# Patient Record
Sex: FEMALE | Race: WHITE | NOT HISPANIC OR LATINO | Employment: FULL TIME | URBAN - METROPOLITAN AREA
[De-identification: names, ages, dates, MRNs, and addresses within clinical notes are randomized per-mention and may not be internally consistent; named-entity substitution may affect disease eponyms.]

---

## 2016-06-01 LAB — PAP SMEAR - HIM PATIENT REPORTED: NEGATIVE

## 2017-02-08 ENCOUNTER — OFFICE VISIT (OUTPATIENT)
Dept: URGENT CARE | Facility: URGENT CARE | Age: 52
End: 2017-02-08
Payer: COMMERCIAL

## 2017-02-08 ENCOUNTER — TELEPHONE (OUTPATIENT)
Dept: FAMILY MEDICINE | Facility: CLINIC | Age: 52
End: 2017-02-08

## 2017-02-08 VITALS
HEART RATE: 78 BPM | TEMPERATURE: 97 F | OXYGEN SATURATION: 100 % | DIASTOLIC BLOOD PRESSURE: 88 MMHG | RESPIRATION RATE: 15 BRPM | BODY MASS INDEX: 32.39 KG/M2 | SYSTOLIC BLOOD PRESSURE: 136 MMHG | WEIGHT: 210 LBS

## 2017-02-08 DIAGNOSIS — J01.00 ACUTE MAXILLARY SINUSITIS, RECURRENCE NOT SPECIFIED: Primary | ICD-10-CM

## 2017-02-08 PROCEDURE — 99213 OFFICE O/P EST LOW 20 MIN: CPT | Performed by: FAMILY MEDICINE

## 2017-02-08 RX ORDER — LEVOFLOXACIN 750 MG/1
750 TABLET, FILM COATED ORAL DAILY
Qty: 10 TABLET | Refills: 0 | Status: SHIPPED | OUTPATIENT
Start: 2017-02-08 | End: 2017-06-01

## 2017-02-08 ASSESSMENT — PAIN SCALES - GENERAL: PAINLEVEL: NO PAIN (0)

## 2017-02-08 NOTE — TELEPHONE ENCOUNTER
No appointment's available today.    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

## 2017-02-08 NOTE — TELEPHONE ENCOUNTER
Reason for call:  Patient reporting a symptom    Symptom or request: sinus infection symptoms    Duration (how long have symptoms been present): 2 weeks    Have you been treated for this before? Yes    Additional comments: pt states having symptoms for two weeks wondering if can get seen today at [place] location anytime 5:30 or after. Please advise and contact pt in regards.    Phone Number patient can be reached at:  Work number on file:  [phone] (work)    Best Time:  ANY    Can we leave a detailed message on this number:  YES    Call taken on 2/8/2017 at 12:26 PM by Sangita Mcdonald

## 2017-02-08 NOTE — TELEPHONE ENCOUNTER
Spoke with patient and she reports sinus congestion, fatigue for about 2 weeks now.  No facial pain, no fever, no breathing difficulties, no cough.   She reports a history of sinus infections that at times turn in to bronchitis   She can only make evening appts, none at [facility], tried [facility], [facility] and [facility].  No openings.  Discussed UC options, she was agreeable to this plan, also discussed Zipnosis.  She reports she will try Zipnosis otherwise will go to UC to be evaluated.  Claudia Warren RN

## 2017-02-09 NOTE — PATIENT INSTRUCTIONS
Sinusitis (Antibiotic Treatment)    The sinuses are air-filled spaces within the bones of the face. They connect to the inside of the nose. Sinusitis is an inflammation of the tissue lining the sinus cavity. Sinus inflammation can occur during a cold. It can also be due to allergies to pollens and other particles in the air. Sinusitis can cause symptoms of sinus congestion and fullness. A sinus infection causes fever, headache and facial pain. There is often green or yellow drainage from the nose or into the back of the throat (post-nasal drip). You have been given antibiotics to treat this condition.  Home care:    Take the full course of antibiotics as instructed. Do not stop taking them, even if you feel better.    Drink plenty of water, hot tea, and other liquids. This may help thin mucus. It also may promote sinus drainage.    Heat may help soothe painful areas of the face. Use a towel soaked in hot water. Or,  the shower and direct the hot spray onto your face. Using a vaporizer along with a menthol rub at night may also help.     An expectorant containing guaifenesin may help thin the mucus and promote drainage from the sinuses.    Over-the-counter decongestants may be used unless a similar medicine was prescribed. Nasal sprays work the fastest. Use one that contains phenylephrine or oxymetazoline. First blow the nose gently. Then use the spray. Do not use these medicines more often than directed on the label or symptoms may get worse. You may also use tablets containing pseudoephedrine. Avoid products that combine ingredients, because side effects may be increased. Read labels. You can also ask the pharmacist for help. (NOTE: Persons with high blood pressure should not use decongestants. They can raise blood pressure.)    Over-the-counter antihistamines may help if allergies contributed to your sinusitis.      Do not use nasal rinses or irrigation during an acute sinus infection, unless told to by  your health care provider. Rinsing may spread the infection to other sinuses.    Use acetaminophen or ibuprofen to control pain, unless another pain medicine was prescribed. (If you have chronic liver or kidney disease or ever had a stomach ulcer, talk with your doctor before using these medicines. Aspirin should never be used in anyone under 18 years of age who is ill with a fever. It may cause severe liver damage.)    Don't smoke. This can worsen symptoms.  Follow-up care  Follow up with your healthcare provider or our staff if you are not improving within the next week.  When to seek medical advice  Call your healthcare provider if any of these occur:    Facial pain or headache becoming more severe    Stiff neck    Unusual drowsiness or confusion    Swelling of the forehead or eyelids    Vision problems, including blurred or double vision    Fever of 100.4 F (38 C) or higher, or as directed by your healthcare provider    Seizure    Breathing problems    Symptoms not resolving within 10 days    3309-9690 [employer]. [address] [place] [address]. All rights reserved. This information is not intended as a substitute for professional medical care. Always follow your healthcare professional's instructions.        Patient Education    Levofloxacin Ophthalmic drops, solution    Levofloxacin Oral solution    Levofloxacin Oral tablet    Levofloxacin Solution for injection    Levofloxacin, Dextrose Solution for injection  Levofloxacin Oral tablet  What is this medicine?  LEVOFLOXACIN (oscar voe FLOX a sin) is a quinolone antibiotic. It is used to treat certain kinds of bacterial infections. It will not work for colds, flu, or other viral infections.  This medicine may be used for other purposes; ask your health care provider or pharmacist if you have questions.  What should I tell my health care provider before I take this medicine?  They need to know if you have any of these  conditions:    cerebral disease    irregular heartbeat    kidney disease    seizure disorder    an unusual or allergic reaction to levofloxacin, other antibiotics or medicines, foods, dyes, or preservatives    pregnant or trying to get pregnant    breast-feeding  How should I use this medicine?  Take this medicine by mouth with a full glass of water. Follow the directions on the prescription label. This medicine can be taken with or without food. Take your medicine at regular intervals. Do not take your medicine more often than directed. Do not skip doses or stop your medicine early even if you feel better. Do not stop taking except on your doctor's advice.  A special MedGuide will be given to you by the pharmacist with each prescription and refill. Be sure to read this information carefully each time.  Talk to your pediatrician regarding the use of this medicine in children. While this drug may be prescribed for children as young as 6 months for selected conditions, precautions do apply.  Overdosage: If you think you have taken too much of this medicine contact a poison control center or emergency room at once.  NOTE: This medicine is only for you. Do not share this medicine with others.  What if I miss a dose?  If you miss a dose, take it as soon as you remember. If it is almost time for your next dose, take only that dose. Do not take double or extra doses.  What may interact with this medicine?  Do not take this medicine with any of the following medications:    arsenic trioxide    chloroquine    droperidol    medicines for irregular heart rhythm like amiodarone, disopyramide, dofetilide, flecainide, quinidine, procainamide, sotalol    some medicines for depression or mental problems like phenothiazines, pimozide, and ziprasidone  This medicine may also interact with the following medications:    amoxapine    antacids    cisapride    dairy products    didanosine (ddI) buffered tablets or  powder    haloperidol    multivitamins    NSAIDS, medicines for pain and inflammation, like ibuprofen or naproxen    retinoid products like tretinoin or isotretinoin    risperidone    some other antibiotics like clarithromycin or erythromycin    sucralfate    theophylline    warfarin  This list may not describe all possible interactions. Give your health care provider a list of all the medicines, herbs, non-prescription drugs, or dietary supplements you use. Also tell them if you smoke, drink alcohol, or use illegal drugs. Some items may interact with your medicine.  What should I watch for while using this medicine?  Tell your doctor or health care professional if your symptoms do not improve or if they get worse. Drink several glasses of water a day and cut down on drinks that contain caffeine. You must not get dehydrated while taking this medicine.  You may get drowsy or dizzy. Do not drive, use machinery, or do anything that needs mental alertness until you know how this medicine affects you. Do not sit or stand up quickly, especially if you are an older patient. This reduces the risk of dizzy or fainting spells.  This medicine can make you more sensitive to the sun. Keep out of the sun. If you cannot avoid being in the sun, wear protective clothing and use a sunscreen. Do not use sun lamps or tanning beds/booths. Contact your doctor if you get a sunburn.  If you are a diabetic monitor your blood glucose carefully. If you get an unusual reading stop taking this medicine and call your doctor right away.  Do not treat diarrhea with over-the-counter products. Contact your doctor if you have diarrhea that lasts more than 2 days or if the diarrhea is severe and watery.  Avoid antacids, calcium, iron, and zinc products for 2 hours before and 2 hours after taking a dose of this medicine.  What side effects may I notice from receiving this medicine?  Side effects that you should report to your doctor or health care  professional as soon as possible:    allergic reactions like skin rash or hives, swelling of the face, lips, or tongue    changes in vision    confusion, nightmares or hallucinations    difficulty breathing    irregular heartbeat, chest pain    joint, muscle or tendon pain    pain or difficulty passing urine    persistent headache with or without blurred vision    redness, blistering, peeling or loosening of the skin, including inside the mouth    seizures    unusual pain, numbness, tingling, or weakness    vaginal irritation, discharge  Side effects that usually do not require medical attention (report to your doctor or health care professional if they continue or are bothersome):    diarrhea    dry mouth    headache    stomach upset, nausea    trouble sleeping  This list may not describe all possible side effects. Call your doctor for medical advice about side effects. You may report side effects to FDA at 5-729-FDA-2233.  Where should I keep my medicine?  Keep out of the reach of children.  Store at room temperature between 15 and 30 degrees C (59 and 86 degrees F). Keep in a tightly closed container. Throw away any unused medicine after the expiration date.  NOTE:This sheet is a summary. It may not cover all possible information. If you have questions about this medicine, talk to your doctor, pharmacist, or health care provider. Copyright  2016 [employer]

## 2017-02-09 NOTE — PROGRESS NOTES
SUBJECTIVE:                                                    Jenae Miller is a 51 year old female who presents to clinic today for the following health issues:      RESPIRATORY SYMPTOMS      Duration: 2 week    Description  nasal congestion, rhinorrhea, facial pain/pressure, cough and post nasal drainage    Severity: moderate    Accompanying signs and symptoms: None    History (predisposing factors):  asthma    Precipitating or alleviating factors: None    Therapies tried and outcome:  none         Problem list and histories reviewed & adjusted, as indicated.  Additional history: as documented    Patient Active Problem List   Diagnosis     CARDIOVASCULAR SCREENING; LDL GOAL LESS THAN 160     Family history of breast cancer in mother     Varicose veins of legs     Colon polyp     Thrush, oral     S/P colonoscopy     Intermittent asthma     Hypertension goal BP (blood pressure) < 140/90     Congenital anomaly of fixation of intestine     Aortic root dilation (H)     Bariatric surgery status     Cancer of midline of right breast (H)     Past Surgical History   Procedure Laterality Date     Cystoscopy,dil urethral stricture       age 7     Hc colonoscopy thru stoma w biopsy/cautery tumor/polyp/lesion  1996     Dr. [place] 8/30/96, '01 Repeat 2008     Breast biopsy, rt/lt  2001     lt 2001, rt  2010     Cryocautery of cervix       [place]     Hc colonoscopy thru stoma, diagnostic  5/07           Tonsillectomy & adenoidectomy  2005     Hc dilation/curettage diag/ther non ob  2006     Hc tooth extraction w/forcep  1986     Mastectomy, bilateral  5/26/10     rt sided breast ca  with reconstruction     Colonoscopy  12.13.12     Repeat Colonoscopy in 5 yrs.     Abdomen surgery  Dec. 2014     Fat grafting for breast reconstruction     Cosmetic surgery  2003 and 2014     Abdomen surgery  6/17/15     Vertical sleeve gastrectomy       Social History   Substance Use Topics     Smoking status: Never Smoker      Smokeless  tobacco: Never Used     Alcohol Use: Yes      Comment: 1-2 drinks a week     Family History   Problem Relation Age of Onset     CANCER Paternal Grandmother      stomach     Colon Polyps Father      large polyps     DIABETES Father      Hypertension Father      Breast Cancer Other      Obesity Father      Obesity Mother      Obesity Sister      Unknown/Adopted Maternal Grandmother      Unknown/Adopted Maternal Grandfather      Breast Cancer Mother 57      at 60 of breast cancer     Obesity Sister      Unknown/Adopted No family hx of          Current Outpatient Prescriptions   Medication Sig Dispense Refill     levofloxacin (LEVAQUIN) 750 MG tablet Take 1 tablet (750 mg) by mouth daily 10 tablet 0     CALCIUM CITRATE PO        albuterol (PROAIR HFA, PROVENTIL HFA, VENTOLIN HFA) 108 (90 BASE) MCG/ACT inhaler Inhale 2 puffs into the lungs every 6 hours as needed for shortness of breath / dyspnea or wheezing 3 Inhaler 1     vitamin  B complex with vitamin C (VITAMIN  B COMPLEX) TABS Take 1 tablet by mouth daily       CycloSPORINE (RESTASIS OP) Apply  to eye 2 times daily.       Omega-3 Fatty Acids (FISH OIL) 1200 MG capsule Take 1 capsule by mouth daily.       cholecalciferol (VITAMIN D) 1000 UNIT tablet Take 1 tablet by mouth 2 times daily.       tamoxifen (NOLVADEX) 20 MG tablet Take 20 mg by mouth daily.       CENTRUM OR TABS 1 TABLET DAILY       Allergies   Allergen Reactions     Lortab [Hydrocodone-Acetaminophen]      Oxycodone Hives     Liquid form   (pt has tolerated the pills in past, though)       Penicillins Hives     Sulfa Drugs      Eyes red and swollen     Tape [Adhesive Tape] Rash     surgical tape     Taxotere Hives     Recent Labs   Lab Test  11/23/15   1854 08/28/15 02/27/15 02/21/14   01/23/13   0910  11   0858   LDL   --    --    --    --    --   78   --    --   107   HDL   --    --    --    --    --   86   --    --   80   TRIG   --    --    --    --    --   136   --    --   152*    ALT   --   14  15  17   < >   --    < >  20  13   CR  0.89   --    --    --    --    --    --   0.81  0.91   GFRESTIMATED  68   --    --    --    --    --    --   >60  68   GFRESTBLACK  82   --    --    --    --    --    --   >60  82   POTASSIUM  3.9   --    --    --    --    --    --   4.1  4.2   TSH   --    --   4.84  2.43   --   3.38   --   2.63  3.16    < > = values in this interval not displayed.      BP Readings from Last 3 Encounters:   02/08/17 136/88   05/24/16 125/80   03/01/16 126/83    Wt Readings from Last 3 Encounters:   02/08/17 210 lb (95.255 kg)   05/24/16 198 lb (89.812 kg)   03/01/16 200 lb (90.719 kg)                  Labs reviewed in EPIC  Problem list, Medication list, Allergies, and Medical/Social/Surgical histories reviewed in [facility] and updated as appropriate.    ROS:  Constitutional, HEENT, cardiovascular, pulmonary, gi and gu systems are negative, except as otherwise noted.    OBJECTIVE:                                                    /88 mmHg  Pulse 78  Temp(Src) 97  F (36.1  C) (Oral)  Resp 15  Wt 210 lb (95.255 kg)  SpO2 100%  Breastfeeding? No  Body mass index is 32.39 kg/(m^2).  GENERAL: healthy, alert and no distress  EYES: Eyes grossly normal to inspection, PERRL and conjunctivae and sclerae normal  HENT: normal cephalic/atraumatic, ear canals and TM's normal, nasal mucosa edematous , oral mucous membranes moist and sinuses: not significantly tender, states feeling pressure  NECK: no adenopathy, no asymmetry, masses, or scars and thyroid normal to palpation  RESP: lungs clear to auscultation - no rales, rhonchi or wheezes  CV: regular rate and rhythm, normal S1 S2, no S3 or S4, no murmur, click or rub, no peripheral edema and peripheral pulses strong  ABDOMEN: soft, nontender, no hepatosplenomegaly, no masses and bowel sounds normal  MS: no gross musculoskeletal defects noted, no edema       ASSESSMENT/PLAN:                                                           ICD-10-CM    1. Acute maxillary sinusitis, recurrence not specified J01.00 levofloxacin (LEVAQUIN) 750 MG tablet       Discussed in detail differentials and further management. Symptoms are possibly secondary to acute maxillary sinusitis. Patient is allergic to penicillin, Levaquin prescribed, common side effects including tendinopathy explained. Recommended well hydration, over-the-counter analgesia, warm fluids and steam inhalation. Patient understood and in agreement with the above plan. All questions are answered.      MEDICATIONS:   Orders Placed This Encounter   Medications     levofloxacin (LEVAQUIN) 750 MG tablet     Sig: Take 1 tablet (750 mg) by mouth daily     Dispense:  10 tablet     Refill:  0     Patient Instructions     Sinusitis (Antibiotic Treatment)    The sinuses are air-filled spaces within the bones of the face. They connect to the inside of the nose. Sinusitis is an inflammation of the tissue lining the sinus cavity. Sinus inflammation can occur during a cold. It can also be due to allergies to pollens and other particles in the air. Sinusitis can cause symptoms of sinus congestion and fullness. A sinus infection causes fever, headache and facial pain. There is often green or yellow drainage from the nose or into the back of the throat (post-nasal drip). You have been given antibiotics to treat this condition.  Home care:    Take the full course of antibiotics as instructed. Do not stop taking them, even if you feel better.    Drink plenty of water, hot tea, and other liquids. This may help thin mucus. It also may promote sinus drainage.    Heat may help soothe painful areas of the face. Use a towel soaked in hot water. Or,  the shower and direct the hot spray onto your face. Using a vaporizer along with a menthol rub at night may also help.     An expectorant containing guaifenesin may help thin the mucus and promote drainage from the sinuses.    Over-the-counter decongestants may be  used unless a similar medicine was prescribed. Nasal sprays work the fastest. Use one that contains phenylephrine or oxymetazoline. First blow the nose gently. Then use the spray. Do not use these medicines more often than directed on the label or symptoms may get worse. You may also use tablets containing pseudoephedrine. Avoid products that combine ingredients, because side effects may be increased. Read labels. You can also ask the pharmacist for help. (NOTE: Persons with high blood pressure should not use decongestants. They can raise blood pressure.)    Over-the-counter antihistamines may help if allergies contributed to your sinusitis.      Do not use nasal rinses or irrigation during an acute sinus infection, unless told to by your health care provider. Rinsing may spread the infection to other sinuses.    Use acetaminophen or ibuprofen to control pain, unless another pain medicine was prescribed. (If you have chronic liver or kidney disease or ever had a stomach ulcer, talk with your doctor before using these medicines. Aspirin should never be used in anyone under 18 years of age who is ill with a fever. It may cause severe liver damage.)    Don't smoke. This can worsen symptoms.  Follow-up care  Follow up with your healthcare provider or our staff if you are not improving within the next week.  When to seek medical advice  Call your healthcare provider if any of these occur:    Facial pain or headache becoming more severe    Stiff neck    Unusual drowsiness or confusion    Swelling of the forehead or eyelids    Vision problems, including blurred or double vision    Fever of 100.4 F (38 C) or higher, or as directed by your healthcare provider    Seizure    Breathing problems    Symptoms not resolving within 10 days    2063-0434 [employer]. [address] [place] [address], [place] [address]. All rights reserved. This information is not intended as a substitute for professional medical care. Always  follow your healthcare professional's instructions.        Patient Education    Levofloxacin Ophthalmic drops, solution    Levofloxacin Oral solution    Levofloxacin Oral tablet    Levofloxacin Solution for injection    Levofloxacin, Dextrose Solution for injection  Levofloxacin Oral tablet  What is this medicine?  LEVOFLOXACIN (oscar muhammad BARRIE tamiko sin) is a quinolone antibiotic. It is used to treat certain kinds of bacterial infections. It will not work for colds, flu, or other viral infections.  This medicine may be used for other purposes; ask your health care provider or pharmacist if you have questions.  What should I tell my health care provider before I take this medicine?  They need to know if you have any of these conditions:    cerebral disease    irregular heartbeat    kidney disease    seizure disorder    an unusual or allergic reaction to levofloxacin, other antibiotics or medicines, foods, dyes, or preservatives    pregnant or trying to get pregnant    breast-feeding  How should I use this medicine?  Take this medicine by mouth with a full glass of water. Follow the directions on the prescription label. This medicine can be taken with or without food. Take your medicine at regular intervals. Do not take your medicine more often than directed. Do not skip doses or stop your medicine early even if you feel better. Do not stop taking except on your doctor's advice.  A special MedGuide will be given to you by the pharmacist with each prescription and refill. Be sure to read this information carefully each time.  Talk to your pediatrician regarding the use of this medicine in children. While this drug may be prescribed for children as young as 6 months for selected conditions, precautions do apply.  Overdosage: If you think you have taken too much of this medicine contact a poison control center or emergency room at once.  NOTE: This medicine is only for you. Do not share this medicine with others.  What if I  miss a dose?  If you miss a dose, take it as soon as you remember. If it is almost time for your next dose, take only that dose. Do not take double or extra doses.  What may interact with this medicine?  Do not take this medicine with any of the following medications:    arsenic trioxide    chloroquine    droperidol    medicines for irregular heart rhythm like amiodarone, disopyramide, dofetilide, flecainide, quinidine, procainamide, sotalol    some medicines for depression or mental problems like phenothiazines, pimozide, and ziprasidone  This medicine may also interact with the following medications:    amoxapine    antacids    cisapride    dairy products    didanosine (ddI) buffered tablets or powder    haloperidol    multivitamins    NSAIDS, medicines for pain and inflammation, like ibuprofen or naproxen    retinoid products like tretinoin or isotretinoin    risperidone    some other antibiotics like clarithromycin or erythromycin    sucralfate    theophylline    warfarin  This list may not describe all possible interactions. Give your health care provider a list of all the medicines, herbs, non-prescription drugs, or dietary supplements you use. Also tell them if you smoke, drink alcohol, or use illegal drugs. Some items may interact with your medicine.  What should I watch for while using this medicine?  Tell your doctor or health care professional if your symptoms do not improve or if they get worse. Drink several glasses of water a day and cut down on drinks that contain caffeine. You must not get dehydrated while taking this medicine.  You may get drowsy or dizzy. Do not drive, use machinery, or do anything that needs mental alertness until you know how this medicine affects you. Do not sit or stand up quickly, especially if you are an older patient. This reduces the risk of dizzy or fainting spells.  This medicine can make you more sensitive to the sun. Keep out of the sun. If you cannot avoid being in the  sun, wear protective clothing and use a sunscreen. Do not use sun lamps or tanning beds/booths. Contact your doctor if you get a sunburn.  If you are a diabetic monitor your blood glucose carefully. If you get an unusual reading stop taking this medicine and call your doctor right away.  Do not treat diarrhea with over-the-counter products. Contact your doctor if you have diarrhea that lasts more than 2 days or if the diarrhea is severe and watery.  Avoid antacids, calcium, iron, and zinc products for 2 hours before and 2 hours after taking a dose of this medicine.  What side effects may I notice from receiving this medicine?  Side effects that you should report to your doctor or health care professional as soon as possible:    allergic reactions like skin rash or hives, swelling of the face, lips, or tongue    changes in vision    confusion, nightmares or hallucinations    difficulty breathing    irregular heartbeat, chest pain    joint, muscle or tendon pain    pain or difficulty passing urine    persistent headache with or without blurred vision    redness, blistering, peeling or loosening of the skin, including inside the mouth    seizures    unusual pain, numbness, tingling, or weakness    vaginal irritation, discharge  Side effects that usually do not require medical attention (report to your doctor or health care professional if they continue or are bothersome):    diarrhea    dry mouth    headache    stomach upset, nausea    trouble sleeping  This list may not describe all possible side effects. Call your doctor for medical advice about side effects. You may report side effects to FDA at 5-554-FDA-2031.  Where should I keep my medicine?  Keep out of the reach of children.  Store at room temperature between 15 and 30 degrees C (59 and 86 degrees F). Keep in a tightly closed container. Throw away any unused medicine after the expiration date.  NOTE:This sheet is a summary. It may not cover all possible  information. If you have questions about this medicine, talk to your doctor, pharmacist, or health care provider. Copyright  2016 [employer]              Lisandro Lam MD  [facility]

## 2017-02-28 ENCOUNTER — APPOINTMENT (OUTPATIENT)
Dept: VASCULAR SURGERY | Facility: CLINIC | Age: 52
End: 2017-02-28
Payer: COMMERCIAL

## 2017-02-28 PROCEDURE — 99207 ZZC VEINSOLUTIONS FREE SCREENING: CPT | Performed by: SURGERY

## 2017-03-03 ENCOUNTER — TRANSFERRED RECORDS (OUTPATIENT)
Dept: HEALTH INFORMATION MANAGEMENT | Facility: CLINIC | Age: 52
End: 2017-03-03

## 2017-03-06 ENCOUNTER — APPOINTMENT (OUTPATIENT)
Dept: VASCULAR SURGERY | Facility: CLINIC | Age: 52
End: 2017-03-06
Payer: COMMERCIAL

## 2017-03-06 ENCOUNTER — TRANSFERRED RECORDS (OUTPATIENT)
Dept: HEALTH INFORMATION MANAGEMENT | Facility: CLINIC | Age: 52
End: 2017-03-06

## 2017-03-06 PROCEDURE — 93970 EXTREMITY STUDY: CPT | Performed by: SURGERY

## 2017-03-06 PROCEDURE — 99203 OFFICE O/P NEW LOW 30 MIN: CPT | Performed by: SURGERY

## 2017-04-27 ENCOUNTER — APPOINTMENT (OUTPATIENT)
Dept: VASCULAR SURGERY | Facility: CLINIC | Age: 52
End: 2017-04-27
Payer: COMMERCIAL

## 2017-04-27 ENCOUNTER — TRANSFERRED RECORDS (OUTPATIENT)
Dept: HEALTH INFORMATION MANAGEMENT | Facility: CLINIC | Age: 52
End: 2017-04-27

## 2017-04-27 PROCEDURE — 37765 STAB PHLEB VEINS XTR 10-20: CPT | Performed by: SURGERY

## 2017-04-27 PROCEDURE — 37799 UNLISTED PX VASCULAR SURGERY: CPT | Performed by: SURGERY

## 2017-04-27 PROCEDURE — 36475 ENDOVENOUS RF 1ST VEIN: CPT | Mod: 50 | Performed by: SURGERY

## 2017-04-27 PROCEDURE — 36468 NJX SCLRSNT SPIDER VEINS: CPT | Performed by: SURGERY

## 2017-04-27 PROCEDURE — S9999 SALES TAX: HCPCS | Performed by: SURGERY

## 2017-05-01 ENCOUNTER — APPOINTMENT (OUTPATIENT)
Dept: VASCULAR SURGERY | Facility: CLINIC | Age: 52
End: 2017-05-01
Payer: COMMERCIAL

## 2017-05-01 PROCEDURE — 99207 ZZC VEINSOLUTIONS 48 HOUR: CPT | Performed by: SURGERY

## 2017-05-01 PROCEDURE — 93970 EXTREMITY STUDY: CPT | Performed by: SURGERY

## 2017-05-30 NOTE — PROGRESS NOTES
SUBJECTIVE:     CC: Jenae Miller is an 51 year old woman who presents for preventive health visit.     h/o mildly dilated aortic root: Should recheck echo this or next year  colonoscopy due Fall '17.. review [facility] surveillence : Done via [facility], Dr James every 6 months. Mammograms no longer done due to bilateral mastectomy.     Had dexa a month ago (tamoxifen therapy), reports this is normal    recentl varicose vein treatment......     weight loss since gastric sleeve procedure    80# net loss.     Physical   Annual:     Getting at least 3 servings of Calcium per day::  Yes    Bi-annual eye exam::  Yes    Dental care twice a year::  Yes    Sleep apnea or symptoms of sleep apnea::  None    Diet::  Regular (no restrictions)    Frequency of exercise::  4-5 days/week    Duration of exercise::  45-60 minutes    Taking medications regularly::  Yes    Medication side effects::  Not applicable    Additional concerns today::  No        Ingrown vaginal hair, just had varicous vein surgery,     Today's PHQ-2 Score:   PHQ-2 ( 1999 [employer]) 5/29/2017   Q1: Little interest or pleasure in doing things Not at all   Q2: Feeling down, depressed or hopeless Not at all   PHQ-2 Score 0       Abuse: Current or Past(Physical, Sexual or Emotional)- NO  Do you feel safe in your environment - Yes    Social History   Substance Use Topics     Smoking status: Never Smoker     Smokeless tobacco: Never Used     Alcohol use Yes      Comment: 1-2 drinks a week     The patient does not drink >3 drinks per day nor >7 drinks per week.    Recent Labs   Lab Test  01/23/13   0910  04/24/09   0858   CHOL  191  218*   HDL  86  80   LDL  78  107   TRIG  136  152*   CHOLHDLRATIO  2.2  2.7       Reviewed orders with patient.  Reviewed health maintenance and updated orders accordingly - Yes    Mammo Decision Support:  Patient over age 50, mutual decision to screen reflected in health maintenance.    Pertinent mammograms are reviewed under the  imaging tab.  History of abnormal Pap smear: patient reports normal pap last year via OBGYN    Reviewed and updated as needed this visit by clinical staff         Reviewed and updated as needed this visit by Provider          Past Medical History:   Diagnosis Date     Acne      Aortic root dilation (H) 6/16/2015     AR (allergic rhinitis)      Arthritis 2005    Knees     Asthma      Back pain      Bell's palsies 11/2008    Right     Breast cancer (H) 4/10    rt side. Dr. Chayo Chan.      Contraception      GERD (gastroesophageal reflux disease)      Hemorrhoids      Hyperlipidemia      Hypertension Feb. 2015     LGSIL (low grade squamous intraepithelial dysplasia) 2004     Malrotation colon      Migraines      TMJ (dislocation of temporomandibular joint)       Past Surgical History:   Procedure Laterality Date     ABDOMEN SURGERY  Dec. 2014    Fat grafting for breast reconstruction     ABDOMEN SURGERY  6/17/15    Vertical sleeve gastrectomy     BREAST BIOPSY, RT/LT  2001    lt 2001, rt  2010     COLONOSCOPY  12.13.12    Repeat Colonoscopy in 5 yrs.     COSMETIC SURGERY  2003 and 2014     CRYOCAUTERY OF CERVIX      [place]     CYSTOSCOPY,DIL URETHRAL STRICTURE      age 7     HC COLONOSCOPY THRU STOMA W BIOPSY/CAUTERY TUMOR/POLYP/LESION  1996    Dr. Jackson 8/30/96, '01 Repeat 2008     HC COLONOSCOPY THRU STOMA, DIAGNOSTIC  5/07          HC DILATION/CURETTAGE DIAG/THER NON OB  2006     HC TOOTH EXTRACTION W/FORCEP  1986     MASTECTOMY, BILATERAL  5/26/10    rt sided breast ca  with reconstruction     TONSILLECTOMY & ADENOIDECTOMY  2005     Obstetric History     No data available          ROS:  C: NEGATIVE for fever, chills, change in weight  I: NEGATIVE for worrisome rashes, moles or lesions  E: NEGATIVE for vision changes or irritation  ENT: NEGATIVE for ear, mouth and throat problems  R: NEGATIVE for significant cough or SOB  B: NEGATIVE for masses, tenderness or discharge  BREAST:  Has some scar tissue at right lateral  breast  CV: NEGATIVE for chest pain, palpitations or peripheral edema  GI: NEGATIVE for nausea, abdominal pain, heartburn, or change in bowel habits  : NEGATIVE for unusual urinary or vaginal symptoms. Periods are regular.   female:  Has ingrown pubic hairs. X 2.  Since waxing done a year or two ago.   M: NEGATIVE for significant arthralgias or myalgia  N: NEGATIVE for weakness, dizziness or paresthesias  P: NEGATIVE for changes in mood or affect  E:  One menstrual period this past year. No Hot Flushes (on tamoxifen).  Has an IUD    Problem list, Medication list, Allergies, and Medical/Social/Surgical histories reviewed in [facility] and updated as appropriate.  Labs reviewed in EPIC  BP Readings from Last 3 Encounters:   06/01/17 128/84   02/08/17 136/88   05/24/16 125/80    Wt Readings from Last 3 Encounters:   06/01/17 218 lb (98.9 kg)   02/08/17 210 lb (95.3 kg)   05/24/16 198 lb (89.8 kg)                  Patient Active Problem List   Diagnosis     CARDIOVASCULAR SCREENING; LDL GOAL LESS THAN 160     Family history of breast cancer in mother     Varicose veins of legs     Colon polyp     Thrush, oral     S/P colonoscopy     Intermittent asthma     Hypertension goal BP (blood pressure) < 140/90     Congenital anomaly of fixation of intestine     Aortic root dilation (H)     Bariatric surgery status     Cancer of midline of right breast (H)     Past Surgical History:   Procedure Laterality Date     ABDOMEN SURGERY  Dec. 2014    Fat grafting for breast reconstruction     ABDOMEN SURGERY  6/17/15    Vertical sleeve gastrectomy     BREAST BIOPSY, RT/LT  2001    lt 2001, rt  2010     COLONOSCOPY  12.13.12    Repeat Colonoscopy in 5 yrs.     COSMETIC SURGERY  2003 and 2014     CRYOCAUTERY OF CERVIX      [place]     CYSTOSCOPY,DIL URETHRAL STRICTURE      age 7     HC COLONOSCOPY THRU STOMA W BIOPSY/CAUTERY TUMOR/POLYP/LESION  1996    Dr. Jackson 8/30/96, '01 Repeat 2008     HC COLONOSCOPY THRU STOMA, DIAGNOSTIC  5/07           HC DILATION/CURETTAGE DIAG/THER NON OB  2006     HC TOOTH EXTRACTION W/FORCEP  1986     MASTECTOMY, BILATERAL  5/26/10    rt sided breast ca  with reconstruction     TONSILLECTOMY & ADENOIDECTOMY         Social History   Substance Use Topics     Smoking status: Never Smoker     Smokeless tobacco: Never Used     Alcohol use Yes      Comment: 1-2 drinks a week     Family History   Problem Relation Age of Onset     Obesity Mother      Breast Cancer Mother 57      at 60 of breast cancer     Colon Polyps Father      large polyps     DIABETES Father      Hypertension Father      Obesity Father      Unknown/Adopted Maternal Grandmother      Unknown/Adopted Maternal Grandfather      CANCER Paternal Grandmother      stomach     Obesity Sister      Obesity Sister      Breast Cancer Other          Current Outpatient Prescriptions   Medication Sig Dispense Refill     CALCIUM CITRATE PO        vitamin  B complex with vitamin C (VITAMIN  B COMPLEX) TABS Take 1 tablet by mouth daily       CycloSPORINE (RESTASIS OP) Apply  to eye 2 times daily.       Omega-3 Fatty Acids (FISH OIL) 1200 MG capsule Take 1 capsule by mouth daily.       cholecalciferol (VITAMIN D) 1000 UNIT tablet Take 1 tablet by mouth 2 times daily.       tamoxifen (NOLVADEX) 20 MG tablet Take 20 mg by mouth daily.       CENTRUM OR TABS 1 TABLET DAILY       albuterol (PROAIR HFA, PROVENTIL HFA, VENTOLIN HFA) 108 (90 BASE) MCG/ACT inhaler Inhale 2 puffs into the lungs every 6 hours as needed for shortness of breath / dyspnea or wheezing (Patient not taking: Reported on 2017) 3 Inhaler 1     Allergies   Allergen Reactions     Lortab [Hydrocodone-Acetaminophen]      Oxycodone Hives     Liquid form   (pt has tolerated the pills in past, though)       Penicillins Hives     Sulfa Drugs      Eyes red and swollen     Tape [Adhesive Tape] Rash     surgical tape     Taxotere Hives     Recent Labs   Lab Test  11/23/15   1854 08/28/15 02/27/15 02/21/14    "01/23/13   0910  06/23/11 04/24/09   0858   LDL   --    --    --    --    --   78   --    --   107   HDL   --    --    --    --    --   86   --    --   80   TRIG   --    --    --    --    --   136   --    --   152*   ALT   --   14  15  17   < >   --    < >  20  13   CR  0.89   --    --    --    --    --    --   0.81  0.91   GFRESTIMATED  68   --    --    --    --    --    --   >60  68   GFRESTBLACK  82   --    --    --    --    --    --   >60  82   POTASSIUM  3.9   --    --    --    --    --    --   4.1  4.2   TSH   --    --   4.84  2.43   --   3.38   --   2.63  3.16    < > = values in this interval not displayed.      OBJECTIVE:     /84 (BP Location: Left arm, Patient Position: Chair, Cuff Size: Adult Large)  Pulse 69  Temp 97.6  F (36.4  C) (Oral)  Ht 5' 7\" (1.702 m)  Wt 218 lb (98.9 kg)  SpO2 96%  BMI 34.14 kg/m2  EXAM:  GENERAL: healthy, alert and no distress  EYES: Eyes grossly normal to inspection, PERRL and conjunctivae and sclerae normal  HENT: ear canals and TM's normal, nose and mouth without ulcers or lesions  NECK: no adenopathy, no asymmetry, masses, or scars and thyroid normal to palpation  RESP: lungs clear to auscultation - no rales, rhonchi or wheezes  BREAST: normal without masses, tenderness or nipple discharge and no palpable axillary masses or adenopathy  CV: regular rate and rhythm, normal S1 S2, no S3 or S4, 2-/6 systolic murmur along LSB, no click or rub, no peripheral edema and peripheral pulses strong  ABDOMEN: soft, nontender, no hepatosplenomegaly, no masses and bowel sounds normal   (female): done via OBGYN.  Two very small plugged hair follicles at right labia majora. No drainage or pain  MS: no gross musculoskeletal defects noted, no edema  SKIN: no suspicious lesions or rashes  NEURO: Normal strength and tone, mentation intact and speech normal  PSYCH: mentation appears normal, affect normal/bright    ASSESSMENT/PLAN:         ICD-10-CM    1. Routine general medical " "examination at a health care facility Z00.00    2. Aortic root dilation (H) I77.810 OFFICE/OUTPT VISIT,EST,LEVL III   3. Cancer of midline of right breast (H) C50.811    4. Dilated aortic root (H) I77.810 Echocardiogram Complete     OFFICE/OUTPT VISIT,EST,LEVL III   5. History of colonic polyps Z86.010 GASTROENTEROLOGY ADULT REF PROCEDURE ONLY     OFFICE/OUTPT VISIT,EST,LEVL III     CANCELED: GASTROENTEROLOGY ADULT REF PROCEDURE ONLY   6. Intermittent asthma, uncomplicated J45.20    7. Hypertension goal BP (blood pressure) < 140/90 I10    8. Colon cancer screening Z12.11 GASTROENTEROLOGY ADULT REF PROCEDURE ONLY     CANCELED: GASTROENTEROLOGY ADULT REF PROCEDURE ONLY   9. Bariatric surgery status Z98.84    10. Screening for malignant neoplasm of cervix Z12.4    11. Use of tamoxifen (Nolvadex) Z79.810 OFFICE/OUTPT VISIT,EST,LEVL III       COUNSELING:  Reviewed preventive health counseling, as reflected in patient instructions       recent weight gain       Regular exercise         reports that she has never smoked. She has never used smokeless tobacco.    Estimated body mass index is 32.41 kg/(m^2) as calculated from the following:    Height as of 5/24/16: 5' 7.5\" (1.715 m).    Weight as of 2/8/17: 210 lb (95.3 kg).       Counseling Resources:  ATP IV Guidelines  Pooled Cohorts Equation Calculator  Breast Cancer Risk Calculator  FRAX Risk Assessment  ICSI Preventive Guidelines  Dietary Guidelines for Americans, 2010  USDA's MyPlate  ASA Prophylaxis  Lung CA Screening    Tonya Mtz MD  [facility]  Answers for HPI/ROS submitted by the patient on 5/29/2017   PHQ-2 Score: 0    "

## 2017-06-01 ENCOUNTER — OFFICE VISIT (OUTPATIENT)
Dept: FAMILY MEDICINE | Facility: CLINIC | Age: 52
End: 2017-06-01
Payer: COMMERCIAL

## 2017-06-01 VITALS
BODY MASS INDEX: 34.21 KG/M2 | WEIGHT: 218 LBS | SYSTOLIC BLOOD PRESSURE: 128 MMHG | DIASTOLIC BLOOD PRESSURE: 84 MMHG | HEIGHT: 67 IN | TEMPERATURE: 97.6 F | OXYGEN SATURATION: 96 % | HEART RATE: 69 BPM

## 2017-06-01 DIAGNOSIS — Z00.00 ROUTINE GENERAL MEDICAL EXAMINATION AT A HEALTH CARE FACILITY: Primary | ICD-10-CM

## 2017-06-01 DIAGNOSIS — J45.20 INTERMITTENT ASTHMA, UNCOMPLICATED: ICD-10-CM

## 2017-06-01 DIAGNOSIS — Z98.84 BARIATRIC SURGERY STATUS: ICD-10-CM

## 2017-06-01 DIAGNOSIS — Z79.810 USE OF TAMOXIFEN (NOLVADEX): ICD-10-CM

## 2017-06-01 DIAGNOSIS — Z86.0100 HISTORY OF COLONIC POLYPS: ICD-10-CM

## 2017-06-01 DIAGNOSIS — C50.811 CANCER OF MIDLINE OF RIGHT BREAST (H): ICD-10-CM

## 2017-06-01 DIAGNOSIS — I77.810 AORTIC ROOT DILATION (H): ICD-10-CM

## 2017-06-01 DIAGNOSIS — I77.810 DILATED AORTIC ROOT (H): ICD-10-CM

## 2017-06-01 DIAGNOSIS — I10 HYPERTENSION GOAL BP (BLOOD PRESSURE) < 140/90: ICD-10-CM

## 2017-06-01 DIAGNOSIS — Z12.4 SCREENING FOR MALIGNANT NEOPLASM OF CERVIX: ICD-10-CM

## 2017-06-01 DIAGNOSIS — Z12.11 COLON CANCER SCREENING: ICD-10-CM

## 2017-06-01 PROCEDURE — 99396 PREV VISIT EST AGE 40-64: CPT | Performed by: INTERNAL MEDICINE

## 2017-06-01 PROCEDURE — 99213 OFFICE O/P EST LOW 20 MIN: CPT | Mod: 25 | Performed by: INTERNAL MEDICINE

## 2017-06-01 NOTE — MR AVS SNAPSHOT
After Visit Summary   6/1/2017    Jenae Miller    MRN: [id number]           Patient Information     Date Of Birth          1965        Visit Information        Provider Department      6/1/2017 5:20 PM Tonya Mtz MD [facility]        Today's Diagnoses     Routine general medical examination at a health care facility    -  1    Intermittent asthma, uncomplicated        Aortic root dilation (H)        Cancer of midline of right breast (H)        History of colonic polyps        Hypertension goal BP (blood pressure) < 140/90        Colon cancer screening        Bariatric surgery status        Screening for malignant neoplasm of cervix        Use of tamoxifen (Nolvadex)        Dilated aortic root (H)          Care Instructions    Call to schedule imaging  :  Echo to recheck aortic root    Colonoscopy will call you    Letter with lab orders.               Follow-ups after your visit        Additional Services     GASTROENTEROLOGY ADULT REF PROCEDURE ONLY       Last Lab Result: Creatinine (mg/dL)       Date                     Value                 11/23/2015               0.89             ----------  Body mass index is 34.14 kg/(m^2).     Needed:  No  Language:  English    Patient will be contacted to schedule procedure.     Please be aware that coverage of these services is subject to the terms and limitations of your health insurance plan.  Call member services at your health plan with any benefit or coverage questions.  Any procedures must be performed at a [place] facility OR coordinated by your clinic's referral office.    Please bring the following with you to your appointment:    (1) Any X-Rays, CTs or MRIs which have been performed.  Contact the facility where they were done to arrange for  prior to your scheduled appointment.    (2) List of current medications   (3) This referral request   (4) Any documents/labs given to you  for this referral                  Your next 10 appointments already scheduled     Jun 09, 2017  3:00 PM CDT   Post Op with Mychal Smith MD   [facility]    [address] [id number] [phone]            Jun 09, 2017  3:15 PM CDT   Assessment Injection with Possible Treatment with Mychal Smith MD   [facility]    [address] [address]   [phone]              Future tests that were ordered for you today     Open Future Orders        Priority Expected Expires Ordered    Echocardiogram Complete Routine  6/1/2018 6/1/2017            Who to contact     If you have questions or need follow up information about today's clinic visit or your schedule please contact [facility] directly at [phone].  Normal or non-critical lab and imaging results will be communicated to you by MyChart, letter or phone within 4 business days after the clinic has received the results. If you do not hear from us within 7 days, please contact the clinic through [employer] or phone. If you have a critical or abnormal lab result, we will notify you by phone as soon as possible.  Submit refill requests through [employer] or call your pharmacy and they will forward the refill request to us. Please allow 3 business days for your refill to be completed.          Additional Information About Your Visit        MyChart Information     [employer] gives you secure access to your electronic health record. If you see a primary care provider, you can also send messages to your care team and make appointments. If you have questions, please call your primary care clinic.  If you do not have a primary care provider, please call [phone] and they will assist you.        Care EveryWhere ID     This is your Care EveryWhere ID. This could be used by other organizations  "to access your [facility] medical records  [id number]        Your Vitals Were     Pulse Temperature Height Pulse Oximetry BMI (Body Mass Index)       69 97.6  F (36.4  C) (Oral) 5' 7\" (1.702 m) 96% 34.14 kg/m2        Blood Pressure from Last 3 Encounters:   06/01/17 128/84   02/08/17 136/88   05/24/16 125/80    Weight from Last 3 Encounters:   06/01/17 218 lb (98.9 kg)   02/08/17 210 lb (95.3 kg)   05/24/16 198 lb (89.8 kg)              We Performed the Following     Asthma Action Plan (AAP)     GASTROENTEROLOGY ADULT REF PROCEDURE ONLY        Primary Care Provider Office Phone # Fax #    Tonya Mtz [phone]       [facility] [address]  [place]. \[facility] Outlook.\"" [id number]        Thank you!     Thank you for choosing [facility]  for your care. Our goal is always to provide you with excellent care. Hearing back from our patients is one way we can continue to improve our services. Please take a few minutes to complete the written survey that you may receive in the mail after your visit with us. Thank you!             Your Updated Medication List - Protect others around you: Learn how to safely use, store and throw away your medicines at www.disposemymeds.org.          This list is accurate as of: 6/1/17  6:10 PM.  Always use your most recent med list.                   Brand Name Dispense Instructions for use    albuterol 108 (90 BASE) MCG/ACT Inhaler    PROAIR HFA/PROVENTIL HFA/VENTOLIN HFA    3 Inhaler    Inhale 2 puffs into the lungs every 6 hours as needed for shortness of breath / dyspnea or wheezing       CALCIUM CITRATE PO          CENTRUM Tabs      1 TABLET DAILY       cholecalciferol 1000 UNIT tablet    vitamin D     Take 1 tablet by mouth 2 times daily.       omega-3 fatty acids 1200 MG capsule      Take 1 capsule by mouth daily.       RESTASIS OP      Apply  to eye 2 times daily.       tamoxifen 20 MG tablet    NOLVADEX     Take 20 mg by mouth " daily.       vitamin B complex with vitamin C Tabs tablet      Take 1 tablet by mouth daily

## 2017-06-01 NOTE — PATIENT INSTRUCTIONS
Call to schedule imaging  :  Echo to recheck aortic root    Colonoscopy will call you    Letter with lab orders.

## 2017-06-01 NOTE — NURSING NOTE
"Chief Complaint   Patient presents with     Physical     Health Maintenance     ACT,AAP,pap,BMP,       Initial /84 (BP Location: Left arm, Patient Position: Chair, Cuff Size: Adult Large)  Pulse 69  Temp 97.6  F (36.4  C) (Oral)  Ht 5' 7\" (1.702 m)  Wt 218 lb (98.9 kg)  SpO2 96%  BMI 34.14 kg/m2 Estimated body mass index is 34.14 kg/(m^2) as calculated from the following:    Height as of this encounter: 5' 7\" (1.702 m).    Weight as of this encounter: 218 lb (98.9 kg).  Medication Reconciliation: complete   Lindsay Lopez ma      "

## 2017-06-01 NOTE — LETTER
[address] [address] [phone]      June 1, 2017      Jenae Miller  [address] [place] [address]        To Whom It May Concern      Please add FLP for cholesterol screening at next upcoming lab.   Thank you      Sincerely,      MARILIA POPE M.D.  [phone] () [phone] (fax)

## 2017-06-02 ASSESSMENT — ASTHMA QUESTIONNAIRES: ACT_TOTALSCORE: 25

## 2017-06-09 ENCOUNTER — APPOINTMENT (OUTPATIENT)
Dept: VASCULAR SURGERY | Facility: CLINIC | Age: 52
End: 2017-06-09

## 2017-06-09 PROCEDURE — 36468 NJX SCLRSNT SPIDER VEINS: CPT | Performed by: SURGERY

## 2017-06-09 PROCEDURE — S9999 SALES TAX: HCPCS | Performed by: SURGERY

## 2017-06-16 ENCOUNTER — RADIANT APPOINTMENT (OUTPATIENT)
Dept: CARDIOLOGY | Facility: CLINIC | Age: 52
End: 2017-06-16
Attending: INTERNAL MEDICINE
Payer: COMMERCIAL

## 2017-06-16 DIAGNOSIS — I77.810 DILATED AORTIC ROOT (H): ICD-10-CM

## 2017-06-16 PROCEDURE — 93306 TTE W/DOPPLER COMPLETE: CPT | Performed by: INTERNAL MEDICINE

## 2017-06-16 NOTE — NURSING NOTE
Patient presents today for resting echo ordered by MD.   Echo Tech provided patient education.    Echo technician completed resting echo. Data transferred to [facility] for final interpretation through [employer].   Patient education provided about cardiology interpretation and primary provider will be notified of results.    Joyce Oscar RDCS

## 2017-06-19 NOTE — PROGRESS NOTES
Jenae Miller    Your echocardiogram is normal.  Thank you for completing this.     Sincerely,     MARILIA POPE M.D.

## 2017-07-13 ENCOUNTER — TELEPHONE (OUTPATIENT)
Dept: FAMILY MEDICINE | Facility: CLINIC | Age: 52
End: 2017-07-13

## 2017-07-13 NOTE — TELEPHONE ENCOUNTER
Panel Management Review      Patient has the following on her problem list:     Hypertension   Last three blood pressure readings:  BP Readings from Last 3 Encounters:   06/01/17 128/84   02/08/17 136/88   05/24/16 125/80     Blood pressure: Passed    HTN Guidelines:  Age 18-59 BP range:  Less than 140/90  Age 60-85 with Diabetes:  Less than 140/90  Age 60-85 without Diabetes:  less than 150/90      Composite cancer screening  Chart review shows that this patient is due/due soon for the following Pap Smear  Summary:    Patient is due/failing the following:   PAP    Action needed:   Patient needs office visit for pap.    Type of outreach:    Patient had a physical with Dr. Mtz on 6/1/17 and patient reported normal pap last year via OBGYN   Routing chart to abstracting  Questions for provider review:    None                                                                                                                                    Harriet Edwards [facility]        Chart routed to Care Team .

## 2017-07-28 ENCOUNTER — APPOINTMENT (OUTPATIENT)
Dept: VASCULAR SURGERY | Facility: CLINIC | Age: 52
End: 2017-07-28

## 2017-07-28 PROCEDURE — 36468 NJX SCLRSNT SPIDER VEINS: CPT | Performed by: SURGERY

## 2017-07-28 PROCEDURE — S9999 SALES TAX: HCPCS | Performed by: SURGERY

## 2017-09-05 ENCOUNTER — TRANSFERRED RECORDS (OUTPATIENT)
Dept: HEALTH INFORMATION MANAGEMENT | Facility: CLINIC | Age: 52
End: 2017-09-05

## 2017-09-22 ENCOUNTER — APPOINTMENT (OUTPATIENT)
Dept: VASCULAR SURGERY | Facility: CLINIC | Age: 52
End: 2017-09-22

## 2017-09-22 PROCEDURE — S9999 SALES TAX: HCPCS | Performed by: SURGERY

## 2017-09-22 PROCEDURE — 36468 NJX SCLRSNT SPIDER VEINS: CPT | Performed by: SURGERY

## 2017-11-03 ENCOUNTER — APPOINTMENT (OUTPATIENT)
Dept: VASCULAR SURGERY | Facility: CLINIC | Age: 52
End: 2017-11-03
Payer: COMMERCIAL

## 2017-11-03 PROCEDURE — 99207 ZZC VEINSOLUTIONS NO CHARGE VISIT: CPT | Performed by: SURGERY

## 2017-11-07 ENCOUNTER — TRANSFERRED RECORDS (OUTPATIENT)
Dept: HEALTH INFORMATION MANAGEMENT | Facility: CLINIC | Age: 52
End: 2017-11-07

## 2017-11-20 ENCOUNTER — APPOINTMENT (OUTPATIENT)
Dept: VASCULAR SURGERY | Facility: CLINIC | Age: 52
End: 2017-11-20
Payer: COMMERCIAL

## 2017-11-20 PROCEDURE — 99207 ZZC VEINSOLUTIONS NO CHARGE VISIT: CPT | Performed by: SURGERY

## 2017-12-15 ENCOUNTER — APPOINTMENT (OUTPATIENT)
Dept: VASCULAR SURGERY | Facility: CLINIC | Age: 52
End: 2017-12-15

## 2017-12-15 ENCOUNTER — TRANSFERRED RECORDS (OUTPATIENT)
Dept: HEALTH INFORMATION MANAGEMENT | Facility: CLINIC | Age: 52
End: 2017-12-15

## 2017-12-15 PROCEDURE — S9999 SALES TAX: HCPCS | Performed by: SURGERY

## 2017-12-15 PROCEDURE — 36468 NJX SCLRSNT SPIDER VEINS: CPT | Performed by: SURGERY

## 2017-12-27 ENCOUNTER — TRANSFERRED RECORDS (OUTPATIENT)
Dept: HEALTH INFORMATION MANAGEMENT | Facility: CLINIC | Age: 52
End: 2017-12-27

## 2018-01-05 PROBLEM — D12.6 SERRATED ADENOMA OF COLON: Status: ACTIVE | Noted: 2018-01-05

## 2018-01-15 ENCOUNTER — APPOINTMENT (OUTPATIENT)
Dept: VASCULAR SURGERY | Facility: CLINIC | Age: 53
End: 2018-01-15
Payer: COMMERCIAL

## 2018-01-15 PROCEDURE — 99207 ZZC VEINSOLUTIONS NO CHARGE VISIT: CPT | Performed by: SURGERY

## 2018-03-02 ENCOUNTER — TRANSFERRED RECORDS (OUTPATIENT)
Dept: HEALTH INFORMATION MANAGEMENT | Facility: CLINIC | Age: 53
End: 2018-03-02

## 2018-03-06 ENCOUNTER — OFFICE VISIT (OUTPATIENT)
Dept: INTERNAL MEDICINE | Facility: CLINIC | Age: 53
End: 2018-03-06
Payer: COMMERCIAL

## 2018-03-06 VITALS
DIASTOLIC BLOOD PRESSURE: 63 MMHG | HEART RATE: 76 BPM | WEIGHT: 223 LBS | BODY MASS INDEX: 34.93 KG/M2 | SYSTOLIC BLOOD PRESSURE: 123 MMHG | TEMPERATURE: 97.7 F | RESPIRATION RATE: 16 BRPM

## 2018-03-06 DIAGNOSIS — R05.9 COUGH: ICD-10-CM

## 2018-03-06 DIAGNOSIS — R09.82 POST-NASAL DRIP: Primary | ICD-10-CM

## 2018-03-06 LAB
DEPRECATED S PYO AG THROAT QL EIA: NORMAL
SPECIMEN SOURCE: NORMAL

## 2018-03-06 PROCEDURE — 87880 STREP A ASSAY W/OPTIC: CPT | Performed by: INTERNAL MEDICINE

## 2018-03-06 PROCEDURE — 99214 OFFICE O/P EST MOD 30 MIN: CPT | Performed by: INTERNAL MEDICINE

## 2018-03-06 PROCEDURE — 87081 CULTURE SCREEN ONLY: CPT | Performed by: INTERNAL MEDICINE

## 2018-03-06 RX ORDER — FLUTICASONE PROPIONATE 50 MCG
1-2 SPRAY, SUSPENSION (ML) NASAL DAILY
Qty: 1 BOTTLE | Refills: 11 | Status: SHIPPED | OUTPATIENT
Start: 2018-03-06 | End: 2018-07-02

## 2018-03-06 RX ORDER — AZELASTINE 1 MG/ML
1-2 SPRAY, METERED NASAL 2 TIMES DAILY PRN
Qty: 1 BOTTLE | Refills: 1 | Status: SHIPPED | OUTPATIENT
Start: 2018-03-06 | End: 2020-08-10

## 2018-03-06 NOTE — PROGRESS NOTES
SUBJECTIVE:   Jenae Miller is a 52 year old female who presents to clinic today for the following health issues:      ENT Symptoms             Symptoms: cc Present Absent Comment   Fever/Chills   x    Fatigue  x     Muscle Aches   x    Eye Irritation   x    Sneezing   x    Nasal Jack/Drg  x     Sinus Pressure/Pain   x    Loss of smell   x    Dental pain   x    Sore Throat  x     Swollen Glands   x    Ear Pain/Fullness  x     Cough  x     Wheeze   x    Chest Pain   x    Shortness of breath   x    Rash   x    Other  x  Losing voice and hurts to swallow     Symptom duration:  3 weeks   Symptom severity:  moderate   Treatments tried:  z pack   Contacts:  family and co-workers     Got azithromycin from ENT without any improvement.  Hasn't used intranasal corticosteroid in years due to history epistaxis.  Has since had nasal septoplasty without identification of culprit vessel.  New oral sexual contact.      1. Post-nasal drip    2. Cough        PMH: Updated and/or reviewed in chart.    PSH: Updated and/or reviewed in chart.    ROS:  Constitutional, HEENT, cardiovascular, pulmonary, gi and gu systems are otherwise negative.    OBJECTIVE:                                                    /63 (BP Location: Left arm, Patient Position: Sitting, Cuff Size: Adult Large)  Pulse 76  Temp 97.7  F (36.5  C) (Tympanic)  Resp 16  Wt 223 lb (101.2 kg)  BMI 34.93 kg/m2    GEN: No acute distress   EYES: EOMI grossly, pupils equal, no conjunctival injection, icterus, or edema  EARS/NOSE: External auditory meati intact, visualized portions of TMs normal, nares clear without purulence; no frontal/maxillary sinus tenderness  MOUTH/THROAT: Posterior oropharynx largely clear, mild post-nasal drip, tiny suggestion of early exudate right PO, otherwise tongue normal, no parotid tenderness/enlargement; dentition adequate  NECK: Trachea midline, no thyromegaly   LYMPH: No anterior/posterior cervical, occipital, or clavicular  lymphadenopathy  RESP: Unlabored breathing, clear to auscultation bilaterally without rales, rhonchi, or wheezes  PSYCH: Normal mood and affect      Results for orders placed or performed in visit on 03/06/18   Strep, Rapid Screen   Result Value Ref Range    Specimen Description Throat     Rapid Strep A Screen       NEGATIVE: No Group A streptococcal antigen detected by immunoassay, await culture report.      ASSESSMENT/PLAN:                                                    1. Post-nasal drip  2. Cough  Postviral syndrome +/- history environmental allergies.  Discussed role of intranasal corticosteroid +/- antihistamine nasal spray.  I see no evidence of acute infection otherwise.  If worsening of sore throat, to return to clinic for repeat throat culture and/or STD screening given new oral sexual contact before sore throat.  - Strep, Rapid Screen  - Beta strep group A culture  - fluticasone (FLONASE) 50 MCG/ACT spray; Spray 1-2 sprays into both nostrils daily  Dispense: 1 Bottle; Refill: 11  - azelastine (ASTELIN) 0.1 % spray; Spray 1-2 sprays into both nostrils 2 times daily as needed for rhinitis or allergies  Dispense: 1 Bottle; Refill: 1      Patient Instructions      Chloraseptic MAX spray as needed     Up to 1,000 mg of acetaminophen up to every 6 hours as needed     Up to 400-600 mg of ibuprofen up to three times daily with meals as needed        Orders Placed This Encounter     fluticasone (FLONASE) 50 MCG/ACT spray     azelastine (ASTELIN) 0.1 % spray          I spent a total of 25 minutes with the patient of which greater than 50% were devoted to counseling and coordination of care: sore throat ddx, supportive care, follow-up planning.      Sergio Turner MD

## 2018-03-06 NOTE — PATIENT INSTRUCTIONS
Chloraseptic MAX spray as needed     Up to 1,000 mg of acetaminophen up to every 6 hours as needed     Up to 400-600 mg of ibuprofen up to three times daily with meals as needed

## 2018-03-06 NOTE — NURSING NOTE
"Chief Complaint   Patient presents with     Ent Problem       Initial /63 (BP Location: Left arm, Patient Position: Sitting, Cuff Size: Adult Large)  Pulse 76  Temp 97.7  F (36.5  C) (Tympanic)  Resp 16  Wt 223 lb (101.2 kg)  BMI 34.93 kg/m2 Estimated body mass index is 34.93 kg/(m^2) as calculated from the following:    Height as of 6/1/17: 5' 7\" (1.702 m).    Weight as of this encounter: 223 lb (101.2 kg).  Medication Reconciliation: complete    "

## 2018-03-06 NOTE — MR AVS SNAPSHOT
After Visit Summary   3/6/2018    Jenae Miller    MRN: [id number]           Patient Information     Date Of Birth          1965        Visit Information        Provider Department      3/6/2018 2:00 PM Sergio Turner MD [facility]        Today's Diagnoses     Post-nasal drip    -  1    Cough          Care Instructions       Chloraseptic MAX spray as needed     Up to 1,000 mg of acetaminophen up to every 6 hours as needed     Up to 400-600 mg of ibuprofen up to three times daily with meals as needed             Follow-ups after your visit        Follow-up notes from your care team     Return if symptoms worsen or fail to improve.      Who to contact     Normal or non-critical lab and imaging results will be communicated to you by Appoethart, letter or phone within 4 business days after the clinic has received the results. If you do not hear from us within 7 days, please contact the clinic through Appoethart or phone. If you have a critical or abnormal lab result, we will notify you by phone as soon as possible.  Submit refill requests through LiveDeal or call your pharmacy and they will forward the refill request to us. Please allow 3 business days for your refill to be completed.          If you need to speak with a  for additional information , please call: [phone]             Additional Information About Your Visit        [employer] Information     LiveDeal gives you secure access to your electronic health record. If you see a primary care provider, you can also send messages to your care team and make appointments. If you have questions, please call your primary care clinic.  If you do not have a primary care provider, please call [phone] and they will assist you.        Care EveryWhere ID     This is your Care EveryWhere ID. This could be used by other organizations to access your [place] medical records  [id number]        Your Vitals Were     Pulse  Temperature Respirations BMI (Body Mass Index)          76 97.7  F (36.5  C) (Tympanic) 16 34.93 kg/m2         Blood Pressure from Last 3 Encounters:   03/06/18 123/63   06/01/17 128/84   02/08/17 136/88    Weight from Last 3 Encounters:   03/06/18 223 lb (101.2 kg)   06/01/17 218 lb (98.9 kg)   02/08/17 210 lb (95.3 kg)              We Performed the Following     Beta strep group A culture     Strep, Rapid Screen          Today's Medication Changes          These changes are accurate as of 3/6/18  2:57 PM.  If you have any questions, ask your nurse or doctor.               Start taking these medicines.        Dose/Directions    azelastine 0.1 % spray   Commonly known as:  ASTELIN   Used for:  Post-nasal drip, Cough   Started by:  Sergio Turner MD        Dose:  1-2 spray   Spray 1-2 sprays into both nostrils 2 times daily as needed for rhinitis or allergies   Quantity:  1 Bottle   Refills:  1       fluticasone 50 MCG/ACT spray   Commonly known as:  FLONASE   Used for:  Post-nasal drip, Cough   Started by:  Sergio Turner MD        Dose:  1-2 spray   Spray 1-2 sprays into both nostrils daily   Quantity:  1 Bottle   Refills:  11            Where to get your medicines      These [id number] [place], [place] - [address]  [address], [place] [address]     Phone:  [phone]     azelastine 0.1 % spray    fluticasone 50 MCG/ACT spray                Primary Care Provider Office Phone # Fax #    Tonya Mtz [phone]       [address]  [facility] [address]        Equal Access to Services     [facility] AH: Haddelia Peck, wacatrinada luюлияadaha, qaaustinta kaalmada alyce, jazzmine vásquez. So [facility] [phone].    ATENCIÓN: Si habla español, tiene a driscoll disposición servicios gratuitos de asistencia lingüística. Llame al [phone].    We comply with applicable federal civil rights laws and [place] laws. We  do not discriminate on the basis of race, color, national origin, age, disability, sex, sexual orientation, or gender identity.            Thank you!     Thank you for choosing [facility]  for your care. Our goal is always to provide you with excellent care. Hearing back from our patients is one way we can continue to improve our services. Please take a few minutes to complete the written survey that you may receive in the mail after your visit with us. Thank you!             Your Updated Medication List - Protect others around you: Learn how to safely use, store and throw away your medicines at www.disposemymeds.org.          This list is accurate as of 3/6/18  2:57 PM.  Always use your most recent med list.                   Brand Name Dispense Instructions for use Diagnosis    albuterol 108 (90 BASE) MCG/ACT Inhaler    PROAIR HFA/PROVENTIL HFA/VENTOLIN HFA    3 Inhaler    Inhale 2 puffs into the lungs every 6 hours as needed for shortness of breath / dyspnea or wheezing    Intermittent asthma, uncomplicated       azelastine 0.1 % spray    ASTELIN    1 Bottle    Spray 1-2 sprays into both nostrils 2 times daily as needed for rhinitis or allergies    Post-nasal drip, Cough       CALCIUM CITRATE PO           CENTRUM Tabs      1 TABLET DAILY        cholecalciferol 1000 UNIT tablet    vitamin D3     Take 1 tablet by mouth 2 times daily.        fluticasone 50 MCG/ACT spray    FLONASE    1 Bottle    Spray 1-2 sprays into both nostrils daily    Post-nasal drip, Cough       omega-3 fatty acids 1200 MG capsule      Take 1 capsule by mouth daily.        RESTASIS OP      Apply  to eye 2 times daily.        tamoxifen 20 MG tablet    NOLVADEX     Take 20 mg by mouth daily.        vitamin B complex with vitamin C Tabs tablet      Take 1 tablet by mouth daily

## 2018-03-07 LAB
BACTERIA SPEC CULT: NORMAL
SPECIMEN SOURCE: NORMAL

## 2018-03-07 ASSESSMENT — ASTHMA QUESTIONNAIRES: ACT_TOTALSCORE: 25

## 2018-07-02 ENCOUNTER — NURSE TRIAGE (OUTPATIENT)
Dept: NURSING | Facility: CLINIC | Age: 53
End: 2018-07-02

## 2018-07-02 ENCOUNTER — OFFICE VISIT (OUTPATIENT)
Dept: URGENT CARE | Facility: URGENT CARE | Age: 53
End: 2018-07-02
Payer: COMMERCIAL

## 2018-07-02 VITALS
DIASTOLIC BLOOD PRESSURE: 86 MMHG | OXYGEN SATURATION: 100 % | WEIGHT: 229 LBS | TEMPERATURE: 97.1 F | SYSTOLIC BLOOD PRESSURE: 181 MMHG | BODY MASS INDEX: 35.87 KG/M2 | HEART RATE: 67 BPM

## 2018-07-02 DIAGNOSIS — R21 RASH: Primary | ICD-10-CM

## 2018-07-02 PROBLEM — E66.01 MORBID OBESITY (H): Status: ACTIVE | Noted: 2018-07-02

## 2018-07-02 PROCEDURE — 99213 OFFICE O/P EST LOW 20 MIN: CPT | Performed by: FAMILY MEDICINE

## 2018-07-02 RX ORDER — TRIAMCINOLONE ACETONIDE 1 MG/G
CREAM TOPICAL 3 TIMES DAILY
Qty: 45 G | Refills: 1 | Status: SHIPPED | OUTPATIENT
Start: 2018-07-02 | End: 2018-08-06

## 2018-07-02 RX ORDER — MONTELUKAST SODIUM 10 MG/1
TABLET ORAL
COMMUNITY
Start: 2018-06-04 | End: 2020-08-10

## 2018-07-02 NOTE — MR AVS SNAPSHOT
After Visit Summary   7/2/2018    Jenae Miller    MRN: [id number]           Patient Information     Date Of Birth          1965        Visit Information        Provider Department      7/2/2018 8:30 PM Justin Mc MD [facility]        Today's Diagnoses     Rash    -  1      Care Instructions      Apply cream 3 times daily.    Claritin, Allegra, Zyrtec (generic) in morning.    Could take Benadryl at night.    Aveeno (oatmeal bath)              Follow-ups after your visit        Who to contact     If you have questions or need follow up information about today's clinic visit or your schedule please contact [facility] directly at [phone].  Normal or non-critical lab and imaging results will be communicated to you by [employer] Limitedhart, letter or phone within 4 business days after the clinic has received the results. If you do not hear from us within 7 days, please contact the clinic through [employer] or phone. If you have a critical or abnormal lab result, we will notify you by phone as soon as possible.  Submit refill requests through [employer] or call your pharmacy and they will forward the refill request to us. Please allow 3 business days for your refill to be completed.          Additional Information About Your Visit        MyChart Information     [employer] gives you secure access to your electronic health record. If you see a primary care provider, you can also send messages to your care team and make appointments. If you have questions, please call your primary care clinic.  If you do not have a primary care provider, please call [phone] and they will assist you.        Care EveryWhere ID     This is your Care EveryWhere ID. This could be used by other organizations to access your [place] medical records  [id number]        Your Vitals Were     Pulse Temperature Pulse Oximetry BMI (Body Mass Index)          67 97.1  F (36.2  C) (Tympanic) 100% 35.87 kg/m2          Blood Pressure from Last 3 Encounters:   07/02/18 181/86   03/06/18 123/63   06/01/17 128/84    Weight from Last 3 Encounters:   07/02/18 229 lb (103.9 kg)   03/06/18 223 lb (101.2 kg)   06/01/17 218 lb (98.9 kg)              Today, you had the following     No orders found for display         Today's Medication Changes          These changes are accurate as of 7/2/18  8:49 PM.  If you have any questions, ask your nurse or doctor.               Start taking these medicines.        Dose/Directions    triamcinolone 0.1 % cream   Commonly known as:  KENALOG   Used for:  Rash   Started by:  Justin Mc MD        Apply topically 3 times daily   Quantity:  45 g   Refills:  1         Stop taking these medicines if you haven't already. Please contact your care team if you have questions.     fluticasone 50 MCG/ACT spray   Commonly known as:  FLONASE   Stopped by:  Justin Mc MD                Where to get your medicines      [id number] [place], [place] - [address]  [address], [place] [address]     Phone:  [phone]     triamcinolone 0.1 % cream                Primary Care Provider Office Phone # Fax #    Tonya Mtz [phone]       [address]  [facility] [address]        Equal Access to Services     [facility]: Hadii dexter eason hadasho Socoraali, waaxda luqadaha, qaybta kaalmada adeegyada, jazzmine tam . So [facility] [phone].    ATENCIÓN: Si habla español, tiene a driscoll disposición servicios gratuitos de asistencia lingüística. Jf al [phone].    We comply with applicable federal civil rights laws and [place] laws. We do not discriminate on the basis of race, color, national origin, age, disability, sex, sexual orientation, or gender identity.            Thank you!     Thank you for choosing [facility]  for your care. Our goal is always to provide you with excellent  care. Hearing back from our patients is one way we can continue to improve our services. Please take a few minutes to complete the written survey that you may receive in the mail after your visit with us. Thank you!             Your Updated Medication List - Protect others around you: Learn how to safely use, store and throw away your medicines at www.disposemymeds.org.          This list is accurate as of 7/2/18  8:49 PM.  Always use your most recent med list.                   Brand Name Dispense Instructions for use Diagnosis    albuterol 108 (90 Base) MCG/ACT Inhaler    PROAIR HFA/PROVENTIL HFA/VENTOLIN HFA    3 Inhaler    Inhale 2 puffs into the lungs every 6 hours as needed for shortness of breath / dyspnea or wheezing    Intermittent asthma, uncomplicated       azelastine 0.1 % spray    ASTELIN    1 Bottle    Spray 1-2 sprays into both nostrils 2 times daily as needed for rhinitis or allergies    Post-nasal drip, Cough       CALCIUM CITRATE PO           CENTRUM Tabs      1 TABLET DAILY        cholecalciferol 1000 UNIT tablet    vitamin D3     Take 1 tablet by mouth 2 times daily.        montelukast 10 MG tablet    SINGULAIR     TAKE 1 TABLET ONCE A DAY FOR ALLERGY AND COUGH CONTROLS.        omega-3 fatty acids 1200 MG capsule      Take 1 capsule by mouth daily.        RESTASIS OP      Apply  to eye 2 times daily.        tamoxifen 20 MG tablet    NOLVADEX     Take 20 mg by mouth daily.        triamcinolone 0.1 % cream    KENALOG    45 g    Apply topically 3 times daily    Rash       vitamin B complex with vitamin C Tabs tablet      Take 1 tablet by mouth daily

## 2018-07-03 NOTE — PATIENT INSTRUCTIONS
Apply cream 3 times daily.    Claritin, Allegra, Zyrtec (generic) in morning.    Could take Benadryl at night.    Aveeno (oatmeal bath)

## 2018-07-03 NOTE — PROGRESS NOTES
CHIEF COMPLAINT    Rash arms and legs.      HISTORY    She has itching rash for a day. [place]    Patient Active Problem List   Diagnosis     CARDIOVASCULAR SCREENING; LDL GOAL LESS THAN 160     Family history of breast cancer in mother     Varicose veins of legs     Colon polyp     Thrush, oral     S/P colonoscopy     Intermittent asthma     Hypertension goal BP (blood pressure) < 140/90     Congenital anomaly of fixation of intestine     Aortic root dilation (H)     Bariatric surgery status     Cancer of midline of right breast (H)     Serrated adenoma of colon     Morbid obesity (H)       REVIEW OF SYSTEMS    Unremarkable except as above.      EXAM  /86  Pulse 67  Temp 97.1  F (36.2  C) (Tympanic)  Wt 229 lb (103.9 kg)  SpO2 100%  BMI 35.87 kg/m2    Scattered small red papules about 2 mm size.      (R21) Rash  (primary encounter diagnosis)  Comment:     Suspect swimmer's itch    Plan: triamcinolone (KENALOG) 0.1 % cream        See instructions.

## 2018-07-03 NOTE — TELEPHONE ENCOUNTER
Patient reports that she has insect bites all over her body. Doesn't know what insect bit her. [place] Is wondering if she was bit by bed bugs or if she has scabies. Said the bites look like small pustules. Bites are itchy. Denies that bites are painful. Afebrile.     Protocol reviewed.   Caller states understanding of the recommended disposition.  Advised to call back if further questions or concerns.     Ximena Marino RN/FNA      Reason for Disposition    [1] Red or very tender (to touch) area AND [2] started over 24 hours after the bite    Additional Information    Negative: [1] Life-threatening reaction (anaphylaxis) in the past to same insect bite AND [2] < 2 hours since bite    Negative: Passed out (i.e., lost consciousness, collapsed and was not responding)    Negative: Difficulty breathing or wheezing    Negative: [1] Hoarseness or cough AND [2] sudden onset following bite    Negative: [1] Difficulty swallowing or slurred speech AND [2] sudden onset following bite    Negative: Sounds like a life-threatening emergency to the triager    Negative: Bee sting(s)    Negative: Spider bite(s)    Negative: Tick bite(s)    Negative: Mosquito bite(s)    Negative: Bed bug bite(s)    Negative: Boil suspected (i.e., painful red lump and NO insect bite)    Negative: Doesn't sound like an insect bite    Negative: Patient sounds very sick or weak to the triager    Negative: [1] SEVERE bite pain AND [2] not improved after 2 hours of pain medicine    Negative: [1] Fever AND [2] red area    Negative: [1] Fever AND [2] area is very tender to touch    Negative: [1] Red streak or red line AND [2] length > 2 inches (5 cm)    Protocols used: INSECT BITE-ADULT-

## 2018-08-05 PROBLEM — E66.01 MORBID OBESITY (H): Status: RESOLVED | Noted: 2018-07-02 | Resolved: 2018-08-05

## 2018-08-05 PROBLEM — E66.9 OBESITY (BMI 35.0-39.9 WITHOUT COMORBIDITY): Status: ACTIVE | Noted: 2018-08-05

## 2018-08-06 ENCOUNTER — OFFICE VISIT (OUTPATIENT)
Dept: FAMILY MEDICINE | Facility: CLINIC | Age: 53
End: 2018-08-06
Payer: COMMERCIAL

## 2018-08-06 VITALS
DIASTOLIC BLOOD PRESSURE: 88 MMHG | HEART RATE: 63 BPM | HEIGHT: 67 IN | OXYGEN SATURATION: 99 % | SYSTOLIC BLOOD PRESSURE: 128 MMHG | BODY MASS INDEX: 36.1 KG/M2 | TEMPERATURE: 98.3 F | WEIGHT: 230 LBS

## 2018-08-06 DIAGNOSIS — I10 HYPERTENSION GOAL BP (BLOOD PRESSURE) < 140/90: ICD-10-CM

## 2018-08-06 DIAGNOSIS — C50.811 CANCER OF MIDLINE OF RIGHT BREAST (H): ICD-10-CM

## 2018-08-06 DIAGNOSIS — E66.9 OBESITY (BMI 35.0-39.9 WITHOUT COMORBIDITY): ICD-10-CM

## 2018-08-06 DIAGNOSIS — E66.01 SEVERE OBESITY (BMI 35.0-39.9): ICD-10-CM

## 2018-08-06 DIAGNOSIS — Z23 NEED FOR SHINGLES VACCINE: ICD-10-CM

## 2018-08-06 DIAGNOSIS — Z90.13 STATUS POST MASTECTOMY, BILATERAL: ICD-10-CM

## 2018-08-06 DIAGNOSIS — D12.6 SERRATED ADENOMA OF COLON: ICD-10-CM

## 2018-08-06 DIAGNOSIS — Z00.00 ROUTINE GENERAL MEDICAL EXAMINATION AT A HEALTH CARE FACILITY: Primary | ICD-10-CM

## 2018-08-06 DIAGNOSIS — J45.20 MILD INTERMITTENT ASTHMA WITHOUT COMPLICATION: ICD-10-CM

## 2018-08-06 PROCEDURE — 99396 PREV VISIT EST AGE 40-64: CPT | Mod: 25 | Performed by: INTERNAL MEDICINE

## 2018-08-06 PROCEDURE — 90471 IMMUNIZATION ADMIN: CPT | Performed by: INTERNAL MEDICINE

## 2018-08-06 PROCEDURE — 90750 HZV VACC RECOMBINANT IM: CPT | Performed by: INTERNAL MEDICINE

## 2018-08-06 NOTE — MR AVS SNAPSHOT
After Visit Summary   8/6/2018    Jenae Miller    MRN: [id number]           Patient Information     Date Of Birth          1965        Visit Information        Provider Department      8/6/2018 8:20 AM Tonya Mtz MD [facility]        Today's Diagnoses     Routine general medical examination at a health care facility    -  1    Cancer of midline of right breast (H)        overweight HCC        overweight HCC        Mild intermittent asthma without complication        Hypertension goal BP (blood pressure) < 140/90        Serrated adenoma of colon        Need for shingles vaccine        Status post mastectomy, bilateral          Care Instructions    Get second shingles vaccine in 1 - 6 months              Follow-ups after your visit        Who to contact     If you have questions or need follow up information about today's clinic visit or your schedule please contact [facility] directly at [phone].  Normal or non-critical lab and imaging results will be communicated to you by [employer], letter or phone within 4 business days after the clinic has received the results. If you do not hear from us within 7 days, please contact the clinic through [employer] or phone. If you have a critical or abnormal lab result, we will notify you by phone as soon as possible.  Submit refill requests through [employer] or call your pharmacy and they will forward the refill request to us. Please allow 3 business days for your refill to be completed.          Additional Information About Your Visit        MyChart Information     [employer] gives you secure access to your electronic health record. If you see a primary care provider, you can also send messages to your care team and make appointments. If you have questions, please call your primary care clinic.  If you do not have a primary care provider, please call [phone] and they will assist you.        Care  "EveryWhere ID     This is your Care EveryWhere ID. This could be used by other organizations to access your [place] medical records  [id number]        Your Vitals Were     Pulse Temperature Height Pulse Oximetry BMI (Body Mass Index)       63 98.3  F (36.8  C) (Oral) 5' 7.25\" (1.708 m) 99% 35.76 kg/m2        Blood Pressure from Last 3 Encounters:   08/06/18 128/88   07/02/18 181/86   03/06/18 123/63    Weight from Last 3 Encounters:   08/06/18 230 lb (104.3 kg)   07/02/18 229 lb (103.9 kg)   03/06/18 223 lb (101.2 kg)              We Performed the Following     ADMIN 1st VACCINE     Asthma Action Plan (AAP)     ZOSTER VACCINE RECOMBINANT ADJUVANTED IM NJX        Primary Care Provider Office Phone # Fax #    Tonya Mtz [phone]       [address] [facility] [address]        Equal Access to Services     GUERO GUAMAN : Hadii aad ku hadasho Soomaali, waaxda luqadaha, qaybta kaalmada adeegyada, waxay idiin hayaan nika tam . So [facility] [phone].    ATENCIÓN: Si habla español, tiene a driscoll disposición servicios gratuitos de asistencia lingüística. Tiffanyame al [phone].    We comply with applicable federal civil rights laws and [place] laws. We do not discriminate on the basis of race, color, national origin, age, disability, sex, sexual orientation, or gender identity.            Thank you!     Thank you for choosing [facility]  for your care. Our goal is always to provide you with excellent care. Hearing back from our patients is one way we can continue to improve our services. Please take a few minutes to complete the written survey that you may receive in the mail after your visit with us. Thank you!             Your Updated Medication List - Protect others around you: Learn how to safely use, store and throw away your medicines at www.disposemymeds.org.          This list is accurate as of 8/6/18  9:10 AM.  Always use your most recent " med list.                   Brand Name Dispense Instructions for use Diagnosis    albuterol 108 (90 Base) MCG/ACT Inhaler    PROAIR HFA/PROVENTIL HFA/VENTOLIN HFA    3 Inhaler    Inhale 2 puffs into the lungs every 6 hours as needed for shortness of breath / dyspnea or wheezing    Intermittent asthma, uncomplicated       azelastine 0.1 % spray    ASTELIN    1 Bottle    Spray 1-2 sprays into both nostrils 2 times daily as needed for rhinitis or allergies    Post-nasal drip, Cough       CALCIUM CITRATE PO           CENTRUM Tabs      1 TABLET DAILY        cholecalciferol 1000 UNIT tablet    vitamin D3     Take 1 tablet by mouth 2 times daily.        montelukast 10 MG tablet    SINGULAIR     TAKE 1 TABLET ONCE A DAY FOR ALLERGY AND COUGH CONTROLS.        omega-3 fatty acids 1200 MG capsule      Take 1 capsule by mouth daily.        RESTASIS OP      Apply  to eye 2 times daily.        tamoxifen 20 MG tablet    NOLVADEX     Take 20 mg by mouth daily.        vitamin B complex with vitamin C Tabs tablet      Take 1 tablet by mouth daily

## 2018-08-06 NOTE — PROGRESS NOTES
SUBJECTIVE:   CC: Jenae Miller is an 52 year old woman who presents for preventive health visit.     53 y/o F here  for AFE.  she has h/o br ca (bilateral mastectomy 2010 and tamoxifen), colon polyp removed 2017 (due 2020), bariatric surgery, aortic root dilation, HTN , intermittent asthma.    Recently a BP was elevated at a clinic visit.      Reviewed labs done 5/2018 via [facility]:  STD screening and FLP, BMP, TSH all normal...now a B12 level pending.         Physical   Annual:     Getting at least 3 servings of Calcium per day:  Yes    Bi-annual eye exam:  Yes    Dental care twice a year:  Yes    Sleep apnea or symptoms of sleep apnea:  None    Diet:  Regular (no restrictions)    Frequency of exercise:  4-5 days/week    Duration of exercise:  45-60 minutes    Taking medications regularly:  Yes    Medication side effects:  Not applicable    Additional concerns today:  No        NO Concerns    Today's PHQ-2 Score:   PHQ-2 ( 1999 [employer]) 8/5/2018   Q1: Little interest or pleasure in doing things 0   Q2: Feeling down, depressed or hopeless 0   PHQ-2 Score 0   Q1: Little interest or pleasure in doing things Not at all   Q2: Feeling down, depressed or hopeless Not at all   PHQ-2 Score 0       Abuse: Current or Past(Physical, Sexual or Emotional)- No  Do you feel safe in your environment - Yes    Social History   Substance Use Topics     Smoking status: Never Smoker     Smokeless tobacco: Never Used     Alcohol use Yes      Comment: 1-2 drinks a week     Alcohol Use 8/5/2018   If you drink alcohol do you typically have greater than 3 drinks per day OR greater than 7 drinks per week? No       Reviewed orders with patient.  Reviewed health maintenance and updated orders accordingly - Yes  Labs reviewed in EPIC  BP Readings from Last 3 Encounters:   08/06/18 128/88   07/02/18 181/86   03/06/18 123/63    Wt Readings from Last 3 Encounters:   08/06/18 230 lb (104.3 kg)   07/02/18 229 lb (103.9 kg)   03/06/18 223 lb  (101.2 kg)                  Patient Active Problem List   Diagnosis     CARDIOVASCULAR SCREENING; LDL GOAL LESS THAN 160     Family history of breast cancer in mother     Varicose veins of legs     Colon polyp     Thrush, oral     S/P colonoscopy     Intermittent asthma     Hypertension goal BP (blood pressure) < 140/90     Congenital anomaly of fixation of intestine     Aortic root dilation (H)     Bariatric surgery status     Cancer of midline of right breast (H)     Serrated adenoma of colon     overweight HCC     Status post mastectomy, bilateral     Past Surgical History:   Procedure Laterality Date     ABDOMEN SURGERY  Dec. 2014    Fat grafting for breast reconstruction     ABDOMEN SURGERY  6/17/15    Vertical sleeve gastrectomy     BREAST BIOPSY, RT/LT      lt , rt       COLONOSCOPY  12.12    Repeat Colonoscopy in 5 yrs.     COSMETIC SURGERY   and      CRYOCAUTERY OF CERVIX      [place]     CYSTOSCOPY,DIL URETHRAL STRICTURE      age 7     HC COLONOSCOPY THRU STOMA W BIOPSY/CAUTERY TUMOR/POLYP/LESION      Dr. Jackson 96, '01 Repeat 2008     HC COLONOSCOPY THRU STOMA, DIAGNOSTIC            HC DILATION/CURETTAGE DIAG/THER NON OB       HC TOOTH EXTRACTION W/FORCEP  1986     MASTECTOMY, BILATERAL  5/26/10    rt sided breast ca  with reconstruction     TONSILLECTOMY & ADENOIDECTOMY  2005       Social History   Substance Use Topics     Smoking status: Never Smoker     Smokeless tobacco: Never Used     Alcohol use Yes      Comment: 1-2 drinks a week     Family History   Problem Relation Age of Onset     Obesity Mother      Breast Cancer Mother 57      at 60 of breast cancer     Colon Polyps Father      large polyps     Diabetes Father      Hypertension Father      Obesity Father      Unknown/Adopted Maternal Grandmother      Unknown/Adopted Maternal Grandfather      Cancer Paternal Grandmother      stomach     Obesity Sister      Obesity Sister      Breast Cancer Other           Current Outpatient Prescriptions   Medication Sig Dispense Refill     CALCIUM CITRATE PO        CENTRUM OR TABS 1 TABLET DAILY       cholecalciferol (VITAMIN D) 1000 UNIT tablet Take 1 tablet by mouth 2 times daily.       CycloSPORINE (RESTASIS OP) Apply  to eye 2 times daily.       montelukast (SINGULAIR) 10 MG tablet TAKE 1 TABLET ONCE A DAY FOR ALLERGY AND COUGH CONTROLS.       Omega-3 Fatty Acids (FISH OIL) 1200 MG capsule Take 1 capsule by mouth daily.       tamoxifen (NOLVADEX) 20 MG tablet Take 20 mg by mouth daily.       vitamin  B complex with vitamin C (VITAMIN  B COMPLEX) TABS Take 1 tablet by mouth daily       albuterol (PROAIR HFA, PROVENTIL HFA, VENTOLIN HFA) 108 (90 BASE) MCG/ACT inhaler Inhale 2 puffs into the lungs every 6 hours as needed for shortness of breath / dyspnea or wheezing (Patient not taking: Reported on 8/6/2018) 3 Inhaler 1     azelastine (ASTELIN) 0.1 % spray Spray 1-2 sprays into both nostrils 2 times daily as needed for rhinitis or allergies (Patient not taking: Reported on 8/6/2018) 1 Bottle 1     Allergies   Allergen Reactions     Lortab [Hydrocodone-Acetaminophen]      Oxycodone Hives     Liquid form   (pt has tolerated the pills in past, though)       Penicillins Hives     Sulfa Drugs      Eyes red and swollen     Tape [Adhesive Tape] Rash     surgical tape     Taxotere Hives     Recent Labs   Lab Test  11/23/15   1854 08/28/15 02/27/15 02/21/14   01/23/13   0910  06/23/11   LDL   --    --    --    --    --   78   --    --    HDL   --    --    --    --    --   86   --    --    TRIG   --    --    --    --    --   136   --    --    ALT   --   14  15  17   < >   --    < >  20   CR  0.89   --    --    --    --    --    --   0.81   GFRESTIMATED  68   --    --    --    --    --    --   >60   GFRESTBLACK  82   --    --    --    --    --    --   >60   POTASSIUM  3.9   --    --    --    --    --    --   4.1   TSH   --    --   4.84  2.43   --   3.38   --   2.63    < > = values in this  interval not displayed.        Alternate mammogram schedule due to breast cancer history   S/p bilateral mastectomy 2010.  Getting MRI survellance every 5 years.     Pertinent mammograms are reviewed under the imaging tab.  History of abnormal Pap smear: NO - age 30-65 PAP every 2 years with negative HPV co-testing recommended  - via OBGYN with ultrasound due to Tamoxifen     Reviewed and updated as needed this visit by clinical staff         Reviewed and updated as needed this visit by Provider          Past Medical History:   Diagnosis Date     Acne      Aortic root dilation (H) 6/16/2015     AR (allergic rhinitis)      Arthritis 2005    Knees     Asthma      Back pain      Bell's palsies 11/2008    Right     Breast cancer (H) 4/10    rt side. Dr. Chayo Chan.      Contraception      GERD (gastroesophageal reflux disease)      Hemorrhoids      Hyperlipidemia      Hypertension Feb. 2015     LGSIL (low grade squamous intraepithelial dysplasia) 2004     Malrotation colon      Migraines      TMJ (dislocation of temporomandibular joint)       Past Surgical History:   Procedure Laterality Date     ABDOMEN SURGERY  Dec. 2014    Fat grafting for breast reconstruction     ABDOMEN SURGERY  6/17/15    Vertical sleeve gastrectomy     BREAST BIOPSY, RT/LT  2001    lt 2001, rt  2010     COLONOSCOPY  12.13.12    Repeat Colonoscopy in 5 yrs.     COSMETIC SURGERY  2003 and 2014     CRYOCAUTERY OF CERVIX      [employer]     CYSTOSCOPY,DIL URETHRAL STRICTURE      age 7     HC COLONOSCOPY THRU STOMA W BIOPSY/CAUTERY TUMOR/POLYP/LESION  1996    Dr. Jackson 8/30/96, '01 Repeat 2008     HC COLONOSCOPY THRU STOMA, DIAGNOSTIC  5/07          HC DILATION/CURETTAGE DIAG/THER NON OB  2006     HC TOOTH EXTRACTION W/FORCEP  1986     MASTECTOMY, BILATERAL  5/26/10    rt sided breast ca  with reconstruction     TONSILLECTOMY & ADENOIDECTOMY  2005       Review of Systems  CONSTITUTIONAL: NEGATIVE for fever, chills, change in weight  INTEGUMENTARU/SKIN:  "NEGATIVE for worrisome rashes, moles or lesions  EYES: NEGATIVE for vision changes or irritation  ENT: NEGATIVE for ear, mouth and throat problems  ENT: ears itch, will see ENT  RESP: NEGATIVE for significant cough or SOB  BREAST:  No complaints.   CV: NEGATIVE for chest pain, palpitations or peripheral edema  GI: NEGATIVE for nausea, abdominal pain, heartburn, or change in bowel habits   female:  No complaints, had PAP 5/2018 via [facility] provider.   MUSCULOSKELETAL: NEGATIVE for significant arthralgias or myalgia  NEURO: NEGATIVE for weakness, dizziness or paresthesias  PSYCHIATRIC: NEGATIVE for changes in mood or affect     OBJECTIVE:   /88 (BP Location: Left arm, Patient Position: Sitting, Cuff Size: Adult Large)  Pulse 63  Temp 98.3  F (36.8  C) (Oral)  Ht 5' 7.25\" (1.708 m)  Wt 230 lb (104.3 kg)  SpO2 99%  BMI 35.76 kg/m2     Physical Exam     GENERAL: healthy, alert and no distress  EYES: Eyes grossly normal to inspection, PERRL and conjunctivae and sclerae normal  HENT: ear canals and TM's normal, nose and mouth without ulcers or lesions  NECK: no adenopathy, no asymmetry, masses, or scars and thyroid normal to palpation  RESP: lungs clear to auscultation - no rales, rhonchi or wheezes  BREAST:bilateral mastectomy w/reconstruction: without masses, tenderness or nipple discharge and no palpable axillary masses or adenopathy   MRI due in about 2 years.   CV: regular rate and rhythm, normal S1 S2, no S3 or S4, no murmur, click or rub, no peripheral edema and peripheral pulses strong  ABDOMEN: soft, nontender, no hepatosplenomegaly, no masses and bowel sounds normal   (female): defer, exam done via OBGYN 5/2018  MS: no gross musculoskeletal defects noted, no edema  SKIN: no suspicious lesions or rashes  NEURO: Normal strength and tone, mentation intact and speech normal  PSYCH: mentation appears normal, affect normal/bright    Diagnostic Test Results:  See HPI and care everywhere. .  Several labs " "done outside [facility]    ASSESSMENT/PLAN:       ICD-10-CM    1. Routine general medical examination at a health care facility Z00.00    2. Cancer of midline of right breast (H) C50.811    3. overweight HCC E66.9    4. overweight HCC E66.01    5. Mild intermittent asthma without complication J45.20    6. Hypertension goal BP (blood pressure) < 140/90 I10    7. Serrated adenoma of colon D12.6    8. Need for shingles vaccine Z23 ZOSTER VACCINE RECOMBINANT ADJUVANTED IM NJX     ADMIN 1st VACCINE   9. Status post mastectomy, bilateral Z90.13      51 y/o F with high risk for recurrent breast cancer s/p bilateral mastectomy, on year 8/10 tamoxifen.   Shingles vaccine.   She is working on counting calories again, trying to stay below 900 per day    COUNSELING:  Reviewed preventive health counseling, as reflected in patient instructions       Immunizations    Vaccinated for: Zoster          BP Readings from Last 1 Encounters:   07/02/18 181/86     Estimated body mass index is 35.87 kg/(m^2) as calculated from the following:    Height as of 6/1/17: 5' 7\" (1.702 m).    Weight as of 7/2/18: 229 lb (103.9 kg).    BP Screening:   Last 3 BP Readings:    BP Readings from Last 3 Encounters:   08/06/18 128/88   07/02/18 181/86   03/06/18 123/63       The following was recommended to the patient:  Re-screen BP within a year and recommended lifestyle modifications  Weight management plan: she has lost significant weight, s/p surgery     reports that she has never smoked. She has never used smokeless tobacco.      Counseling Resources:  ATP IV Guidelines  Pooled Cohorts Equation Calculator  Breast Cancer Risk Calculator  FRAX Risk Assessment  ICSI Preventive Guidelines  Dietary Guidelines for Americans, 2010  [employer]'s MyPlate  ASA Prophylaxis  Lung CA Screening    Tonya Mtz MD  [facility]  Answers for HPI/ROS submitted by the patient on 8/5/2018   PHQ-2 Score: 0    "

## 2018-08-06 NOTE — NURSING NOTE
1.  Has the patient received the information for the Zostavax vaccine? YES    2.  Does the patient have any of the following contraindications?     Allergy to Neomycin or Gelatin? No       Current moderate or severe illness? No  Temp = 98.3  If temp > 99.0 degrees orally or patient has above allergies, vaccine is deferred    3.  The vaccine has been administered in the usual fashion and the patient was instructed to wait 15 minutes before leaving the building in the event of an allergic reaction: YES    Vaccination given by Lindsay Lopez ma  .  Recorded by Lindsay Lopez

## 2018-08-07 ENCOUNTER — TRANSFERRED RECORDS (OUTPATIENT)
Dept: HEALTH INFORMATION MANAGEMENT | Facility: CLINIC | Age: 53
End: 2018-08-07

## 2018-08-07 ASSESSMENT — ASTHMA QUESTIONNAIRES: ACT_TOTALSCORE: 25

## 2018-09-07 ENCOUNTER — TRANSFERRED RECORDS (OUTPATIENT)
Dept: HEALTH INFORMATION MANAGEMENT | Facility: CLINIC | Age: 53
End: 2018-09-07

## 2018-11-19 ENCOUNTER — ALLIED HEALTH/NURSE VISIT (OUTPATIENT)
Dept: NURSING | Facility: CLINIC | Age: 53
End: 2018-11-19
Payer: COMMERCIAL

## 2018-11-19 DIAGNOSIS — Z23 NEED FOR SHINGLES VACCINE: Primary | ICD-10-CM

## 2018-11-19 PROCEDURE — 99207 ZZC NO CHARGE NURSE ONLY: CPT

## 2018-11-19 PROCEDURE — 90750 HZV VACC RECOMBINANT IM: CPT

## 2018-11-19 PROCEDURE — 90471 IMMUNIZATION ADMIN: CPT

## 2019-01-27 ENCOUNTER — OFFICE VISIT (OUTPATIENT)
Dept: URGENT CARE | Facility: URGENT CARE | Age: 54
End: 2019-01-27
Payer: COMMERCIAL

## 2019-01-27 VITALS
HEART RATE: 75 BPM | BODY MASS INDEX: 35.91 KG/M2 | SYSTOLIC BLOOD PRESSURE: 130 MMHG | WEIGHT: 231 LBS | RESPIRATION RATE: 16 BRPM | DIASTOLIC BLOOD PRESSURE: 80 MMHG | TEMPERATURE: 99.2 F | OXYGEN SATURATION: 99 %

## 2019-01-27 DIAGNOSIS — J01.90 ACUTE SINUSITIS WITH COEXISTING CONDITION REQUIRING PROPHYLACTIC TREATMENT: Primary | ICD-10-CM

## 2019-01-27 DIAGNOSIS — I10 HYPERTENSION GOAL BP (BLOOD PRESSURE) < 140/90: ICD-10-CM

## 2019-01-27 PROCEDURE — 99213 OFFICE O/P EST LOW 20 MIN: CPT | Performed by: FAMILY MEDICINE

## 2019-01-27 RX ORDER — AZITHROMYCIN 250 MG/1
TABLET, FILM COATED ORAL
Qty: 6 TABLET | Refills: 0 | Status: SHIPPED | OUTPATIENT
Start: 2019-01-27 | End: 2019-05-25

## 2019-01-27 NOTE — PROGRESS NOTES
Chief complaint: cough and fever    Just got back from [facility] yesterday    A week ago started with a cough and sinus congestion and slight sore throat  But then 2 days got worse   Coughing is worse  Lost her voice    Denies any possibility of pregnancy declined a pregnancy test  Colds, sinus congestion, facial pain  Cough Yes  Greenish discharge  Fever No  Progressively getting worse: YES  Thought was getting better then started getting worse: YES  Getting better:  No  Exposure to pertussis or pertussis like symptoms: No    Problem list and histories reviewed & adjusted, as indicated.  Additional history: as documented    Problem list, Medication list, Allergies, and Medical/Social/Surgical histories reviewed in [facility] and updated as appropriate.    ROS:  Constitutional, HEENT, cardiovascular, pulmonary, gi and gu systems are negative, except as otherwise noted.    OBJECTIVE:                                                    /80   Pulse 75   Temp 99.2  F (37.3  C) (Oral)   Resp 16   Wt 104.8 kg (231 lb)   SpO2 99%   BMI 35.91 kg/m    Body mass index is 35.91 kg/m .  GENERAL: healthy, alert and no distress  EYES: Eyes grossly normal to inspection, PERRL and conjunctivae and sclerae normal  HENT: ear canals and TM's normal, nose and mouth without ulcers or lesions  Sinuses: turbinates erythematous maxillary sinus tenderness and frontal sinus tenderness   NECK: no adenopathy, no asymmetry, masses, or scars and thyroid normal to palpation  RESP: lungs clear to auscultation - no rales, rhonchi or wheezes   CV: regular rate and rhythm, normal S1 S2, no S3 or S4, no murmur, click or rub, no peripheral edema and peripheral pulses strong  ABDOMEN: soft, nontender, no hepatosplenomegaly, no masses and bowel sounds normal  MS: no gross musculoskeletal defects noted, no edema  SKIN: no suspicious lesions or rashes  NEURO: Normal strength and tone, mentation intact and speech normal  PSYCH: mentation appears normal,  affect normal/bright    Diagnostic Test Results:  No results found for this or any previous visit (from the past 24 hour(s)).     ASSESSMENT/PLAN:                                                        ICD-10-CM    1. Acute sinusitis with coexisting condition requiring prophylactic treatment J01.90 azithromycin (ZITHROMAX) 250 MG tablet   2. Hypertension goal BP (blood pressure) < 140/90 I10      If symptoms persist she will start zithromax  Patient given a prescription for zithromax. Side effects of antibiotic discussed. Aware of small but statistically significant increase cardiovascular risk with antibiotic.  Influenza has been going around as well and symptoms may be secondary to influenza however patient already past 2 days of symptoms and treatment for influenza at this point is largely supportive. Offered testing for confirmation patient declined  Recommend follow up with primary care provider if no relief , sooner if worse  Adverse reactions of medications discussed.  Over the counter medications discussed.   Aware to come back in if with worsening symptoms or if no relief despite treatment plan  Patient voiced understanding and had no further questions.     BP was elevated initially - on recheck was normal  Follow up with primary care provider recommended.     MD Deborah Barker MD  [facility]

## 2019-03-08 ENCOUNTER — TRANSFERRED RECORDS (OUTPATIENT)
Dept: HEALTH INFORMATION MANAGEMENT | Facility: CLINIC | Age: 54
End: 2019-03-08

## 2019-05-25 ENCOUNTER — OFFICE VISIT (OUTPATIENT)
Dept: URGENT CARE | Facility: URGENT CARE | Age: 54
End: 2019-05-25
Payer: COMMERCIAL

## 2019-05-25 VITALS
DIASTOLIC BLOOD PRESSURE: 71 MMHG | BODY MASS INDEX: 36.53 KG/M2 | HEART RATE: 76 BPM | SYSTOLIC BLOOD PRESSURE: 141 MMHG | TEMPERATURE: 99.3 F | WEIGHT: 235 LBS | OXYGEN SATURATION: 97 %

## 2019-05-25 DIAGNOSIS — R05.9 COUGH: Primary | ICD-10-CM

## 2019-05-25 PROCEDURE — 99213 OFFICE O/P EST LOW 20 MIN: CPT | Performed by: INTERNAL MEDICINE

## 2019-05-25 RX ORDER — ALBUTEROL SULFATE 90 UG/1
2 AEROSOL, METERED RESPIRATORY (INHALATION) EVERY 4 HOURS PRN
Qty: 18 G | Refills: 0 | Status: SHIPPED | OUTPATIENT
Start: 2019-05-25 | End: 2020-08-10

## 2019-05-25 RX ORDER — BENZONATATE 100 MG/1
100 CAPSULE ORAL 3 TIMES DAILY PRN
Qty: 30 CAPSULE | Refills: 0 | Status: SHIPPED | OUTPATIENT
Start: 2019-05-25 | End: 2020-08-10

## 2019-05-25 RX ORDER — IPRATROPIUM BROMIDE 21 UG/1
SPRAY, METERED NASAL
Refills: 2 | COMMUNITY
Start: 2019-05-20 | End: 2020-08-10

## 2019-05-25 RX ORDER — AZITHROMYCIN 250 MG/1
TABLET, FILM COATED ORAL
Qty: 6 TABLET | Refills: 1 | Status: SHIPPED | OUTPATIENT
Start: 2019-05-25 | End: 2019-07-31

## 2019-05-25 NOTE — PROGRESS NOTES
SUBJECTIVE:  Jenae Miller is an 53 year old female who presents for not feeling well.  Five days ago started getting some sore throat for a couple days and then developed hoarseness of voice and nasal congestion.  Has also started coughing.  Tends to get bronchitis a couple times a year.  Co-worker has been sick with bronchitis.  Cough is productive.  Having increased drainage down back of throat and runny nose.  Recently allergist started her on Azelastine and ipratropium nasal spray.  No fevers, some sweats and chills yesterday.  Took some otc guaifenesin which maybe helped a tiny bit.  No recent travel.  No n/v/d.  No ear pain.  No eye drainage.  Cough is productive of some yellow to green sputum.      PMH:   has a past medical history of Acne, Aortic root dilation (H) (6/16/2015), AR (allergic rhinitis), Arthritis (2005), Asthma, Back pain, Bell's palsies (11/2008), Breast cancer (H) (4/10), Contraception, GERD (gastroesophageal reflux disease), Hemorrhoids, Hyperlipidemia, Hypertension (Feb. 2015), LGSIL (low grade squamous intraepithelial dysplasia) (2004), Malrotation colon, Migraines, and TMJ (dislocation of temporomandibular joint).  Patient Active Problem List   Diagnosis     CARDIOVASCULAR SCREENING; LDL GOAL LESS THAN 160     Family history of breast cancer in mother     Varicose veins of legs     Colon polyp     Thrush, oral     S/P colonoscopy     Intermittent asthma     Hypertension goal BP (blood pressure) < 140/90     Congenital anomaly of fixation of intestine     Aortic root dilation (H)     Bariatric surgery status     Cancer of midline of right breast (H)     Serrated adenoma of colon     overweight HCC     Status post mastectomy, bilateral     Social History     Socioeconomic History     Marital status:      Spouse name: None     Number of children: None     Years of education: None     Highest education level: None   Occupational History     None   Social Needs     Financial  resource strain: None     Food insecurity:     Worry: None     Inability: None     Transportation needs:     Medical: None     Non-medical: None   Tobacco Use     Smoking status: Never Smoker     Smokeless tobacco: Never Used   Substance and Sexual Activity     Alcohol use: Yes     Comment: 1-2 drinks a week     Drug use: No     Sexual activity: Yes     Partners: Male     Birth control/protection: IUD   Lifestyle     Physical activity:     Days per week: None     Minutes per session: None     Stress: None   Relationships     Social connections:     Talks on phone: None     Gets together: None     Attends [employer]ism service: None     Active member of club or organization: None     Attends meetings of clubs or organizations: None     Relationship status: None     Intimate partner violence:     Fear of current or ex partner: None     Emotionally abused: None     Physically abused: None     Forced sexual activity: None   Other Topics Concern     Parent/sibling w/ CABG, MI or angioplasty before 65F 55M? No   Social History Narrative    .  No kids.  Lives alone in [facility].  , full time.       Family History   Problem Relation Age of Onset     Obesity Mother      Breast Cancer Mother 57         at 60 of breast cancer     Colon Polyps Father         large polyps     Diabetes Father      Hypertension Father      Obesity Father      Unknown/Adopted Maternal Grandmother      Unknown/Adopted Maternal Grandfather      Cancer Paternal Grandmother         stomach     Obesity Sister      Obesity Sister      Breast Cancer Other        ALLERGIES:  Lortab [hydrocodone-acetaminophen]; Oxycodone; Penicillins; Sulfa drugs; Tape [adhesive tape]; and Taxotere    Current Outpatient Medications   Medication     azelastine (ASTELIN) 0.1 % spray     CALCIUM CITRATE PO     CENTRUM OR TABS     cholecalciferol (VITAMIN D) 1000 UNIT tablet     CycloSPORINE (RESTASIS OP)     Omega-3 Fatty Acids (FISH OIL) 1200 MG  capsule     tamoxifen (NOLVADEX) 20 MG tablet     vitamin  B complex with vitamin C (VITAMIN  B COMPLEX) TABS     albuterol (PROAIR HFA, PROVENTIL HFA, VENTOLIN HFA) 108 (90 BASE) MCG/ACT inhaler     azithromycin (ZITHROMAX) 250 MG tablet     ipratropium (ATROVENT) 0.03 % nasal spray     montelukast (SINGULAIR) 10 MG tablet     No current facility-administered medications for this visit.          ROS:  ROS is done and is negative for general/constitutional, eye, ENT, Respiratory, cardiovascular, GI, , Skin, musculoskeletal except as noted elsewhere.  All other review of systems negative except as noted elsewhere.      OBJECTIVE:  /71   Pulse 76   Temp 99.3  F (37.4  C) (Oral)   Wt 106.6 kg (235 lb)   SpO2 97%   Breastfeeding? No   BMI 36.53 kg/m    GENERAL APPEARANCE: Alert, in no acute distress  EYES: normal  EARS: External ears normal. Canals clear. TM's normal.  NOSE:moderate light yellow discharge and mildly inflamed mucosa  OROPHARYNX:normal  NECK:No adenopathy,masses or thyromegaly  RESP: no crackles.  Coarse upper airway noise intermittently.  CV:regular rate and rhythm and no murmurs, clicks, or gallops  ABDOMEN: Abdomen soft, non-tender. BS normal. No masses, organomegaly  SKIN: no ulcers, lesions or rash  MUSCULOSKELETAL:Musculoskeletal normal      RESULTS  .  No results found for this or any previous visit (from the past 48 hour(s)).    ASSESSMENT/PLAN:    ASSESSMENT / PLAN:  (R05) Cough  (primary encounter diagnosis)  Comment: given pt's h/o bronchitis and the fairly quick worsening of her sxs, will start her on tx with zmax as this has worked well for her in the past.  Currently no evidence of pneumonia.  Also albuterol and tessalon prn for cough.     Plan: azithromycin (ZITHROMAX) 250 MG tablet,         albuterol (PROAIR HFA/PROVENTIL HFA/VENTOLIN         HFA) 108 (90 Base) MCG/ACT inhaler, benzonatate        (TESSALON) 100 MG capsule        Reviewed medication instructions and side  effects. Follow up if experiences side effects.. I reviewed supportive care, otc meds to use if needed, expected course, and signs of concern.  Follow up as needed or if she does not improve within 7 day(s) or if worsens in any way.  Reviewed red flag symptoms and is to go to the ER if experiences any of these.      See [facility] for orders, medications, letters, patient instructions    Chayo Mccollum M.D.

## 2019-06-17 PROBLEM — E66.01 SEVERE OBESITY WITH BODY MASS INDEX (BMI) OF 35.0 TO 39.9 WITH SERIOUS COMORBIDITY (H): Status: ACTIVE | Noted: 2018-08-06

## 2019-06-19 ENCOUNTER — TRANSFERRED RECORDS (OUTPATIENT)
Dept: HEALTH INFORMATION MANAGEMENT | Facility: CLINIC | Age: 54
End: 2019-06-19

## 2019-06-19 LAB — PAP-ABSTRACT: NORMAL

## 2019-06-28 ENCOUNTER — TRANSFERRED RECORDS (OUTPATIENT)
Dept: HEALTH INFORMATION MANAGEMENT | Facility: CLINIC | Age: 54
End: 2019-06-28

## 2019-07-03 ENCOUNTER — TELEPHONE (OUTPATIENT)
Dept: FAMILY MEDICINE | Facility: CLINIC | Age: 54
End: 2019-07-03

## 2019-07-03 NOTE — LETTER
July 3, 2019    Jenae Miller  [address] [place] [address]    Dear Jenae    We care about your health and have reviewed your health plan. We have reviewed your medical conditions, medication list, and lab results and are making recommendations based on this review, to better manage your health.    You are in particular need of attention regarding:  - Your Asthma  - Scheduling a Physical with a Cervical Cancer Screening (Pap Smear) age 64 and younger [phone]    Please complete the  ACT questionnaire and mail back to the clinic in the self addressed envelope provided, thank you.    Enclosed with this letter is a questionnaire about depression for your upcoming visit. Please fill this out and mail back or contact us. We care about you and want to make sure you get the best care possible. If at any time you feel unsafe or have concerns about safety of others please take  immediate action by calling [phone], for Mental Health Crisis phone support 24 hours a day, 365 days per year. As always, you can also go the your local Emergency Room, or call 911 if you have immediate safety concerns.    Here is a list of Health Maintenance topics that are due now or due soon:  Health Maintenance Due   Topic Date Due     LIPID  01/23/2018     PHQ-2  01/01/2019     ASTHMA CONTROL TEST  02/06/2019     BMP  05/08/2019     PAP  06/01/2019     We will be calling you in the next couple of weeks to help you schedule any appointments that are needed.  Please call us at [phone] (or use [employer]) to address the above recommendations.     Thank you for trusting [facility] and we appreciate the opportunity to serve you.  We look forward to supporting your healthcare needs in the future.    Healthy Regards,    Dr. Mtz/cw  Team 1

## 2019-07-03 NOTE — TELEPHONE ENCOUNTER
Panel Management Review      Patient has the following on her problem list:       Composite cancer screening  Chart review shows that this patient is due/due soon for the following Pap Smear  Summary:    Patient is due/failing the following:   ACT, PAP and PHYSICAL    Action needed:   Patient needs office visit for a physical with pap,ACT .    Type of outreach:    Sent letter.    Questions for provider review:    None                                                                                                                                    Lindsay Lopez ma       Chart routed to Care Team .

## 2019-07-15 ENCOUNTER — TRANSFERRED RECORDS (OUTPATIENT)
Dept: HEALTH INFORMATION MANAGEMENT | Facility: CLINIC | Age: 54
End: 2019-07-15

## 2019-07-16 ASSESSMENT — ASTHMA QUESTIONNAIRES: ACT_TOTALSCORE: 25

## 2019-07-17 ENCOUNTER — TRANSFERRED RECORDS (OUTPATIENT)
Dept: HEALTH INFORMATION MANAGEMENT | Facility: CLINIC | Age: 54
End: 2019-07-17

## 2019-08-01 ENCOUNTER — OFFICE VISIT (OUTPATIENT)
Dept: FAMILY MEDICINE | Facility: CLINIC | Age: 54
End: 2019-08-01
Payer: COMMERCIAL

## 2019-08-01 VITALS
SYSTOLIC BLOOD PRESSURE: 129 MMHG | TEMPERATURE: 98 F | OXYGEN SATURATION: 96 % | BODY MASS INDEX: 37.35 KG/M2 | DIASTOLIC BLOOD PRESSURE: 83 MMHG | WEIGHT: 238 LBS | HEIGHT: 67 IN | HEART RATE: 66 BPM

## 2019-08-01 DIAGNOSIS — Z00.01 ENCOUNTER FOR GENERAL ADULT MEDICAL EXAMINATION WITH ABNORMAL FINDINGS: Primary | ICD-10-CM

## 2019-08-01 DIAGNOSIS — Z12.4 SCREENING FOR MALIGNANT NEOPLASM OF CERVIX: ICD-10-CM

## 2019-08-01 DIAGNOSIS — M17.0 PRIMARY OSTEOARTHRITIS OF BOTH KNEES: ICD-10-CM

## 2019-08-01 DIAGNOSIS — Z13.6 CARDIOVASCULAR SCREENING; LDL GOAL LESS THAN 160: ICD-10-CM

## 2019-08-01 DIAGNOSIS — Z79.810 LONG-TERM CURRENT USE OF TAMOXIFEN: ICD-10-CM

## 2019-08-01 DIAGNOSIS — E66.01 MORBID OBESITY (H): ICD-10-CM

## 2019-08-01 DIAGNOSIS — Z98.84 BARIATRIC SURGERY STATUS: ICD-10-CM

## 2019-08-01 DIAGNOSIS — I10 HYPERTENSION GOAL BP (BLOOD PRESSURE) < 140/90: ICD-10-CM

## 2019-08-01 DIAGNOSIS — J45.20 MILD INTERMITTENT ASTHMA WITHOUT COMPLICATION: ICD-10-CM

## 2019-08-01 DIAGNOSIS — C50.811 CANCER OF MIDLINE OF RIGHT BREAST (H): ICD-10-CM

## 2019-08-01 DIAGNOSIS — Z90.13 STATUS POST MASTECTOMY, BILATERAL: ICD-10-CM

## 2019-08-01 PROCEDURE — 99396 PREV VISIT EST AGE 40-64: CPT | Performed by: INTERNAL MEDICINE

## 2019-08-01 PROCEDURE — 99213 OFFICE O/P EST LOW 20 MIN: CPT | Mod: 25 | Performed by: INTERNAL MEDICINE

## 2019-08-01 ASSESSMENT — ENCOUNTER SYMPTOMS
CONSTIPATION: 0
HEMATURIA: 0
PARESTHESIAS: 0
COUGH: 0
WEAKNESS: 0
MYALGIAS: 1
HEADACHES: 0
CHILLS: 0
DIARRHEA: 0
HEMATOCHEZIA: 0
JOINT SWELLING: 1
DYSURIA: 0
ARTHRALGIAS: 1
NERVOUS/ANXIOUS: 0
HEARTBURN: 0
NAUSEA: 0
PALPITATIONS: 0
EYE PAIN: 0
FEVER: 0
DIZZINESS: 0
BREAST MASS: 0
FREQUENCY: 0
SORE THROAT: 0
ABDOMINAL PAIN: 0
SHORTNESS OF BREATH: 0

## 2019-08-01 ASSESSMENT — ASTHMA QUESTIONNAIRES
QUESTION_1 LAST FOUR WEEKS HOW MUCH OF THE TIME DID YOUR ASTHMA KEEP YOU FROM GETTING AS MUCH DONE AT WORK, SCHOOL OR AT HOME: NONE OF THE TIME
QUESTION_3 LAST FOUR WEEKS HOW OFTEN DID YOUR ASTHMA SYMPTOMS (WHEEZING, COUGHING, SHORTNESS OF BREATH, CHEST TIGHTNESS OR PAIN) WAKE YOU UP AT NIGHT OR EARLIER THAN USUAL IN THE MORNING: NOT AT ALL
ACT_TOTALSCORE: 25
QUESTION_4 LAST FOUR WEEKS HOW OFTEN HAVE YOU USED YOUR RESCUE INHALER OR NEBULIZER MEDICATION (SUCH AS ALBUTEROL): NOT AT ALL
QUESTION_5 LAST FOUR WEEKS HOW WOULD YOU RATE YOUR ASTHMA CONTROL: COMPLETELY CONTROLLED
QUESTION_2 LAST FOUR WEEKS HOW OFTEN HAVE YOU HAD SHORTNESS OF BREATH: NOT AT ALL

## 2019-08-01 ASSESSMENT — PAIN SCALES - GENERAL: PAINLEVEL: NO PAIN (0)

## 2019-08-01 ASSESSMENT — MIFFLIN-ST. JEOR: SCORE: 1719.25

## 2019-08-01 NOTE — Clinical Note
Hi, are you still doing weight loss?  Alta is a nice lady with complex hx.   Gaining 10# per year despite Gastric Sleeve procedure.  She goes to her bariatric clinic yearly, no further recommendations given.  Would you be able to see her and consider medication management at this point?  Thanks a lot!  Hope you are doing well.

## 2019-08-01 NOTE — Clinical Note
Call patient and tell her she may see Dr Pires to help with weight management.   She can just call [phone] and ask to schedule in Dr Pires's weight clinic specifically (the IM clinic is closed).

## 2019-08-01 NOTE — PATIENT INSTRUCTIONS
Chair exercises for knee and hamstrings stretching as discussed.     Lifestyle clinic To schedule an appointment, please call [facility] at [phone] (let me see if I can get you in with Dr Pires first)    Return to clinic 1 y for annual physical.

## 2019-08-01 NOTE — LETTER
My Asthma Action Plan  Name: Jenae Miller   YOB: 1965  Date: 8/1/2019   My doctor: Tonya Mtz MD   My clinic: [facility]        My Control Medicine: None  My Rescue Medicine: Albuterol (Proair/Ventolin/Proventil) inhaler     My Asthma Severity: intermittent  Avoid your asthma triggers: upper respiratory infections                GREEN ZONE   Good Control    I feel good    No cough or wheeze    Can work, sleep and play without asthma symptoms       Take your asthma control medicine every day.     1. If exercise triggers your asthma, take your rescue medication    15 minutes before exercise or sports, and    During exercise if you have asthma symptoms  2. Spacer to use with inhaler: If you have a spacer, make sure to use it with your inhaler             YELLOW ZONE Getting Worse  I have ANY of these:    I do not feel good    Cough or wheeze    Chest feels tight    Wake up at night   1. Keep taking your Green Zone medications  2. Start taking your rescue medicine:    every 20 minutes for up to 1 hour. Then every 4 hours for 24-48 hours.  3. If you stay in the Yellow Zone for more than 12-24 hours, contact your doctor.  4. If you do not return to the Green Zone in 12-24 hours or you get worse, start taking your oral steroid medicine if prescribed by your provider.           RED ZONE Medical Alert - Get Help  I have ANY of these:    I feel awful    Medicine is not helping    Breathing getting harder    Trouble walking or talking    Nose opens wide to breathe       1. Take your rescue medicine NOW  2. If your provider has prescribed an oral steroid medicine, start taking it NOW  3. Call your doctor NOW  4. If you are still in the Red Zone after 20 minutes and you have not reached your doctor:    Take your rescue medicine again and    Call 911 or go to the emergency room right away    See your regular doctor within 2 weeks of an Emergency Room or Urgent Care visit for  follow-up treatment.          Annual Reminders:  Meet with Asthma Educator,  Flu Shot in the Fall, consider Pneumonia Vaccination for patients with asthma (aged 19 and older).    Pharmacy:    [id number] - [place], [place] - [address]  [id number] [place], [place] - [address] [facility] [address] AT CORNER OF [facility]                      Asthma Triggers  How To Control Things That Make Your Asthma Worse    Triggers are things that make your asthma worse.  Look at the list below to help you find your triggers and what you can do about them.  You can help prevent asthma flare-ups by staying away from your triggers.      Trigger                                                          What you can do   Cigarette Smoke  Tobacco smoke can make asthma worse. Do not allow smoking in your home, car or around you.  Be sure no one smokes at a child s day care or school.  If you smoke, ask your health care provider for ways to help you quit.  Ask family members to quit too.  Ask your health care provider for a referral to Quit Plan to help you quit smoking, or call 9-888-241-PLAN.     Colds, Flu, Bronchitis  These are common triggers of asthma. Wash your hands often.  Don t touch your eyes, nose or mouth.  Get a flu shot every year.     Dust Mites  These are tiny bugs that live in cloth or carpet. They are too small to see. Wash sheets and blankets in hot water every week.   Encase pillows and mattress in dust mite proof covers.  Avoid having carpet if you can. If you have carpet, vacuum weekly.   Use a dust mask and HEPA vacuum.   Pollen and Outdoor Mold  Some people are allergic to trees, grass, or weed pollen, or molds. Try to keep your windows closed.  Limit time out doors when pollen count is high.   Ask you health care provider about taking medicine during allergy season.     Animal Dander  Some people are allergic to skin flakes, urine or saliva from pets with fur or feathers. Keep pets with fur or  feathers out of your home.    If you can t keep the pet outdoors, then keep the pet out of your bedroom.  Keep the bedroom door closed.  Keep pets off cloth furniture and away from stuffed toys.     Mice, Rats, and Cockroaches  Some people are allergic to the waste from these pests.   Cover food and garbage.  Clean up spills and food crumbs.  Store grease in the refrigerator.   Keep food out of the bedroom.   Indoor Mold  This can be a trigger if your home has high moisture. Fix leaking faucets, pipes, or other sources of water.   Clean moldy surfaces.  Dehumidify basement if it is damp and smelly.   Smoke, Strong Odors, and Sprays  These can reduce air quality. Stay away from strong odors and sprays, such as perfume, powder, hair spray, paints, smoke incense, paint, cleaning products, candles and new carpet.   Exercise or Sports  Some people with asthma have this trigger. Be active!  Ask your doctor about taking medicine before sports or exercise to prevent symptoms.    Warm up for 5-10 minutes before and after sports or exercise.     Other Triggers of Asthma  Cold air:  Cover your nose and mouth with a scarf.  Sometimes laughing or crying can be a trigger.  Some medicines and food can trigger asthma.

## 2019-08-01 NOTE — PROGRESS NOTES
SUBJECTIVE:   CC: Jenae Miller is an 53 year old woman who presents for preventive health visit.     52 y/o F here  for AFE. She is single, living in [facility], full time .  She has h/o br ca (bilateral mastectomy 2010 and tamoxifen), colon polyp removed 2017 (due 2020), bariatric surgery, HTN , intermittent asthma     Healthy Habits:     Getting at least 3 servings of Calcium per day:  Yes    Bi-annual eye exam:  Yes    Dental care twice a year:  Yes    Sleep apnea or symptoms of sleep apnea:  None    Diet:  Regular (no restrictions)    Frequency of exercise:  4-5 days/week    Duration of exercise:  45-60 minutes    Taking medications regularly:  Yes    Barriers to taking medications:  None    Medication side effects:  None    PHQ-2 Total Score: 0    Additional concerns today:  No               Today's PHQ-2 Score:   PHQ-2 ( 1999 Pfizer) 8/1/2019   Q1: Little interest or pleasure in doing things 0   Q2: Feeling down, depressed or hopeless 0   PHQ-2 Score 0   Q1: Little interest or pleasure in doing things Not at all   Q2: Feeling down, depressed or hopeless Not at all   PHQ-2 Score 0       Abuse: Current or Past(Physical, Sexual or Emotional)- No  Do you feel safe in your environment? Yes    Social History     Tobacco Use     Smoking status: Never Smoker     Smokeless tobacco: Never Used   Substance Use Topics     Alcohol use: Yes     Comment: 1-2 drinks a week     If you drink alcohol do you typically have >3 drinks per day or >7 drinks per week? No    Alcohol Use 8/1/2019   Prescreen: >3 drinks/day or >7 drinks/week? No   Prescreen: >3 drinks/day or >7 drinks/week? -       Reviewed orders with patient.  Reviewed health maintenance and updated orders accordingly - Yes  Lab work is in process  Labs reviewed in EPIC  BP Readings from Last 3 Encounters:   08/01/19 129/83   05/25/19 141/71   01/27/19 130/80    Wt Readings from Last 3 Encounters:   08/01/19 108 kg (238 lb)   05/25/19 106.6 kg  (235 lb)   19 104.8 kg (231 lb)                  Patient Active Problem List   Diagnosis     CARDIOVASCULAR SCREENING; LDL GOAL LESS THAN 160     Family history of breast cancer in mother     Varicose veins of legs     Colon polyp     Thrush, oral     S/P colonoscopy     Intermittent asthma     Hypertension goal BP (blood pressure) < 140/90     Congenital anomaly of fixation of intestine     Bariatric surgery status     Cancer of midline of right breast (H)     Serrated adenoma of colon     overweight HCC     Status post mastectomy, bilateral     Past Surgical History:   Procedure Laterality Date     ABDOMEN SURGERY  Dec. 2014    Fat grafting for breast reconstruction     ABDOMEN SURGERY  6/17/15    Vertical sleeve gastrectomy     BREAST BIOPSY, RT/LT      lt , rt       COLONOSCOPY  12.12    Repeat Colonoscopy in 5 yrs.     COSMETIC SURGERY   and      CRYOCAUTERY OF CERVIX      [facility]     CYSTOSCOPY,DIL URETHRAL STRICTURE      age 7     HC COLONOSCOPY THRU STOMA W BIOPSY/CAUTERY TUMOR/POLYP/LESION      Dr. Jackson 96, '01 Repeat      HC COLONOSCOPY THRU STOMA, DIAGNOSTIC            HC DILATION/CURETTAGE DIAG/THER NON OB       HC TOOTH EXTRACTION W/FORCEP  1986     MASTECTOMY, BILATERAL  5/26/10    rt sided breast ca  with reconstruction     TONSILLECTOMY & ADENOIDECTOMY  2005       Social History     Tobacco Use     Smoking status: Never Smoker     Smokeless tobacco: Never Used   Substance Use Topics     Alcohol use: Yes     Comment: 1-2 drinks a week     Family History   Problem Relation Age of Onset     Obesity Mother      Breast Cancer Mother 57         at 60 of breast cancer     Colon Polyps Father         large polyps     Diabetes Father      Hypertension Father      Obesity Father      Unknown/Adopted Maternal Grandmother      Unknown/Adopted Maternal Grandfather      Cancer Paternal Grandmother         stomach     Obesity Sister      Obesity Sister       Breast Cancer Other          Current Outpatient Medications   Medication Sig Dispense Refill     albuterol (PROAIR HFA/PROVENTIL HFA/VENTOLIN HFA) 108 (90 Base) MCG/ACT inhaler Inhale 2 puffs into the lungs every 4 hours as needed for other (cough) 18 g 0     azelastine (ASTELIN) 0.1 % spray Spray 1-2 sprays into both nostrils 2 times daily as needed for rhinitis or allergies 1 Bottle 1     benzonatate (TESSALON) 100 MG capsule Take 1 capsule (100 mg) by mouth 3 times daily as needed for cough 30 capsule 0     CALCIUM CITRATE PO        CENTRUM OR TABS 1 TABLET DAILY       cholecalciferol (VITAMIN D) 1000 UNIT tablet Take 1 tablet by mouth 2 times daily.       CycloSPORINE (RESTASIS OP) Apply  to eye 2 times daily.       ipratropium (ATROVENT) 0.03 % nasal spray INSTILL 2 SPRAYS IN EACH NOSTRIL 3 TIMES DAILY AS NEEDED  2     montelukast (SINGULAIR) 10 MG tablet TAKE 1 TABLET ONCE A DAY FOR ALLERGY AND COUGH CONTROLS.       Omega-3 Fatty Acids (FISH OIL) 1200 MG capsule Take 1 capsule by mouth daily.       tamoxifen (NOLVADEX) 20 MG tablet Take 20 mg by mouth daily.       vitamin  B complex with vitamin C (VITAMIN  B COMPLEX) TABS Take 1 tablet by mouth daily       albuterol (PROAIR HFA, PROVENTIL HFA, VENTOLIN HFA) 108 (90 BASE) MCG/ACT inhaler Inhale 2 puffs into the lungs every 6 hours as needed for shortness of breath / dyspnea or wheezing (Patient not taking: Reported on 8/6/2018) 3 Inhaler 1     Allergies   Allergen Reactions     Sulfa Drugs Anaphylaxis     Eyes red and swollen     Adhesive Tape Rash     surgical tape  Other reaction(s): Hives     Hydrocodone-Acetaminophen Hives     Other reaction(s): Flushing, Hives  Liquid  Lortab only, reports pill with no issues       Penicillins Hives     Taxotere Hives     Recent Labs   Lab Test 11/23/15  1854 08/28/15 02/27/15 02/21/14  01/23/13  0910  06/23/11   LDL  --   --   --   --   --  78  --   --    HDL  --   --   --   --   --  86  --   --    TRIG  --   --   --    --   --  136  --   --    ALT  --  14 15 17   < >  --    < > 20   CR 0.89  --   --   --   --   --   --  0.81   GFRESTIMATED 68  --   --   --   --   --   --  >60   GFRESTBLACK 82  --   --   --   --   --   --  >60   POTASSIUM 3.9  --   --   --   --   --   --  4.1   TSH  --   --  4.84 2.43  --  3.38  --  2.63    < > = values in this interval not displayed.        Mammogram Screening: Patient over age 50, mutual decision to screen reflected in health maintenance.    Pertinent mammograms are reviewed under the imaging tab.  History of abnormal Pap smear: done recently at OBGYN...      Reviewed and updated as needed this visit by clinical staff  Tobacco  Allergies  Meds  Med Hx  Surg Hx  Fam Hx  Soc Hx        Reviewed and updated as needed this visit by Provider          Past Medical History:   Diagnosis Date     Acne      Aortic root dilation (H) 6/16/2015     AR (allergic rhinitis)      Arthritis 2005    Knees     Asthma      Back pain      Bell's palsies 11/2008    Right     Breast cancer (H) 4/10    rt side. Dr. Chayo Chan.      Contraception      GERD (gastroesophageal reflux disease)      Hemorrhoids      Hyperlipidemia      Hypertension Feb. 2015     LGSIL (low grade squamous intraepithelial dysplasia) 2004     Malrotation colon      Migraines      TMJ (dislocation of temporomandibular joint)       Past Surgical History:   Procedure Laterality Date     ABDOMEN SURGERY  Dec. 2014    Fat grafting for breast reconstruction     ABDOMEN SURGERY  6/17/15    Vertical sleeve gastrectomy     BREAST BIOPSY, RT/LT  2001    lt 2001, rt  2010     COLONOSCOPY  12.13.12    Repeat Colonoscopy in 5 yrs.     COSMETIC SURGERY  2003 and 2014     CRYOCAUTERY OF CERVIX      Cut Off     CYSTOSCOPY,DIL URETHRAL STRICTURE      age 7     HC COLONOSCOPY THRU STOMA W BIOPSY/CAUTERY TUMOR/POLYP/LESION  1996    Dr. Jackson 8/30/96, '01 Repeat 2008     HC COLONOSCOPY THRU STOMA, DIAGNOSTIC  5/07          HC DILATION/CURETTAGE DIAG/THER NON OB   2006      TOOTH EXTRACTION W/FORCEP  1986     MASTECTOMY, BILATERAL  5/26/10    rt sided breast ca  with reconstruction     TONSILLECTOMY & ADENOIDECTOMY  2005       Review of Systems   Constitutional: Negative for chills and fever.   HENT: Negative for congestion, ear pain, hearing loss and sore throat.    Eyes: Negative for pain and visual disturbance.   Respiratory: Negative for cough and shortness of breath.    Cardiovascular: Positive for peripheral edema. Negative for chest pain and palpitations.   Gastrointestinal: Negative for abdominal pain, constipation, diarrhea, heartburn, hematochezia and nausea.   Breasts:  Negative for tenderness, breast mass and discharge.   Genitourinary: Negative for dysuria, frequency, genital sores, hematuria, pelvic pain, urgency, vaginal bleeding and vaginal discharge.   Musculoskeletal: Positive for arthralgias, joint swelling and myalgias.          She has bilateral knee dejenerative joint arthritis pain.  Seen at [facility] this past Spring seen by orthopedics.  Work up included knee xrays which showed dejenerative joint arthritis advancing from prior xrays.    Skin: Negative for rash.   Neurological: Negative for dizziness, weakness, headaches and paresthesias.   Psychiatric/Behavioral: Negative for mood changes. The patient is not nervous/anxious.      CONSTITUTIONAL: NEGATIVE for fever, chills, change in weight  INTEGUMENTARY/SKIN: NEGATIVE for worrisome rashes, moles or lesions  EYES: NEGATIVE for vision changes or irritation  ENT: NEGATIVE for ear, mouth and throat problems  RESP: NEGATIVE for significant cough or SOB  BREAST: NEGATIVE for masses, tenderness or discharge  CV: NEGATIVE for chest pain, palpitations or peripheral edema  GI: NEGATIVE for nausea, abdominal pain, heartburn, or change in bowel habits  : NEGATIVE for unusual urinary or vaginal symptoms. No vaginal bleeding.  MUSCULOSKELETAL: NEGATIVE for significant arthralgias or myalgia  NEURO: NEGATIVE for  "weakness, dizziness or paresthesias  ENDOCRINE: NEGATIVE for temperature intolerance, skin/hair changes  PSYCHIATRIC: NEGATIVE for changes in mood or affect      OBJECTIVE:   /83 (BP Location: Left arm, Patient Position: Chair, Cuff Size: Adult Large)   Pulse 66   Temp 98  F (36.7  C) (Oral)   Ht 1.689 m (5' 6.5\")   Wt 108 kg (238 lb)   SpO2 96%   BMI 37.84 kg/m    Physical Exam  GENERAL APPEARANCE: healthy, alert and no distress  EYES: Eyes grossly normal to inspection, PERRL and conjunctivae and sclerae normal  HENT: ear canals and TM's normal, nose and mouth without ulcers or lesions, oropharynx clear and oral mucous membranes moist  NECK: no adenopathy, no asymmetry, masses, or scars and thyroid normal to palpation  RESP: lungs clear to auscultation - no rales, rhonchi or wheezes  BREAST: bilateral mastectomy with reconstruction  CV: regular rate and rhythm, normal S1 S2, no S3 or S4, no murmur, click or rub, no peripheral edema and peripheral pulses strong  ABDOMEN: soft, nontender, no hepatosplenomegaly, no masses and bowel sounds normal   (female): deferral.   MS: no musculoskeletal defects are noted and gait is age appropriate without ataxia  SKIN: no suspicious lesions or rashes  NEURO: Normal strength and tone, sensory exam grossly normal, mentation intact and speech normal  PSYCH: mentation appears normal and affect normal/bright    Diagnostic Test Results:  Labs reviewed in [facility] Care Everywhere.     ASSESSMENT/PLAN:       ICD-10-CM    1. Encounter for general adult medical examination with abnormal findings Z00.01    2. Primary osteoarthritis of both knees M17.0 OFFICE/OUTPT VISIT,EST,LEVL III   3. Cancer of midline of right breast (H) C50.811    4. Bariatric surgery status Z98.84 WEIGHT MANAGEMENT/ P LIFESTYLE PROGRAM REFERRAL   5. Hypertension goal BP (blood pressure) < 140/90 I10 OFFICE/OUTPT VISIT,EST,LEVL III   6. Mild intermittent asthma without complication J45.20 Asthma Action " "Plan (AAP)   7. Screening for malignant neoplasm of cervix Z12.4    8. CARDIOVASCULAR SCREENING; LDL GOAL LESS THAN 160 Z13.6    9. Long-term current use of tamoxifen Z79.810 OFFICE/OUTPT VISIT,EST,LEVL III   10. Status post mastectomy, bilateral Z90.13    11. Morbid obesity (H) E66.01 WEIGHT MANAGEMENT/ [facility] LIFESTYLE PROGRAM REFERRAL     OFFICE/OUTPT VISIT,EST,LEVL III     Minimal sequelae with tamoxifen.  No sequelae per DEXA and recent GYN evaluation  H/o Adriamycin, no cardiac sequelae    Patient Instructions   Chair exercises for knee and hamstrings stretching as discussed.   Demonstrated in clinic today    Lifestyle clinic To schedule an appointment, please call [facility] at [phone] (let me see if I can get you in with Dr Pires first)   Gaining 10# per year past 3 years... S/p gastric sleeve about 8 years ago with initial success .   She is willing to try medication therapy for weight loss if appropriate.  Via barometric surgery clinic, she is well versed on education, etc... So, Lifestyle clinic might not be as good a fit...  If Dr Pires not available, she is agreeable to trial Lifestyle clinic, though.   LATER ENTRY:  Dr Pires can see her, will direct patient     Return to clinic 1 y for annual physical.         COUNSELING:  Reviewed preventive health counseling, as reflected in patient instructions       Regular exercise       Colon cancer screening    Estimated body mass index is 37.84 kg/m  as calculated from the following:    Height as of this encounter: 1.689 m (5' 6.5\").    Weight as of this encounter: 108 kg (238 lb).    Weight management plan: will refer to weight loss clinic.. she has gained 38 # since 2016.  She is interested in [facility] lifestyle clinic to help her lose weight.  she claims she does not eat much at all after the gastric sleeve operation years ago.  she fell this past year and injured left leg, was on bedrest for a while and gained 8#.  She is interested in medical weight " management as well.       reports that she has never smoked. She has never used smokeless tobacco.      Counseling Resources:  ATP IV Guidelines  Pooled Cohorts Equation Calculator  Breast Cancer Risk Calculator  FRAX Risk Assessment  ICSI Preventive Guidelines  Dietary Guidelines for Americans, 2010  USDA's MyPlate  ASA Prophylaxis  Lung CA Screening    Tonya Mtz MD  [facility]

## 2019-08-02 ENCOUNTER — TELEPHONE (OUTPATIENT)
Dept: FAMILY MEDICINE | Facility: CLINIC | Age: 54
End: 2019-08-02

## 2019-08-02 ASSESSMENT — ASTHMA QUESTIONNAIRES: ACT_TOTALSCORE: 25

## 2019-08-13 ENCOUNTER — OFFICE VISIT (OUTPATIENT)
Dept: INTERNAL MEDICINE | Facility: CLINIC | Age: 54
End: 2019-08-13
Payer: COMMERCIAL

## 2019-08-13 VITALS
TEMPERATURE: 98.1 F | OXYGEN SATURATION: 99 % | WEIGHT: 234.5 LBS | BODY MASS INDEX: 36.81 KG/M2 | RESPIRATION RATE: 13 BRPM | DIASTOLIC BLOOD PRESSURE: 82 MMHG | HEIGHT: 67 IN | SYSTOLIC BLOOD PRESSURE: 134 MMHG | HEART RATE: 65 BPM

## 2019-08-13 DIAGNOSIS — C50.811 CANCER OF MIDLINE OF RIGHT BREAST (H): ICD-10-CM

## 2019-08-13 DIAGNOSIS — Z98.84 BARIATRIC SURGERY STATUS: ICD-10-CM

## 2019-08-13 DIAGNOSIS — M17.0 PRIMARY OSTEOARTHRITIS OF BOTH KNEES: ICD-10-CM

## 2019-08-13 PROCEDURE — 99214 OFFICE O/P EST MOD 30 MIN: CPT | Performed by: INTERNAL MEDICINE

## 2019-08-13 ASSESSMENT — MIFFLIN-ST. JEOR: SCORE: 1703.38

## 2019-08-13 ASSESSMENT — PAIN SCALES - GENERAL: PAINLEVEL: NO PAIN (0)

## 2019-08-13 NOTE — PATIENT INSTRUCTIONS
- Consider Phentermine in the afternoon or evening- is a low dose to use as needed to suppress cravings. Avoid taking it with coffee.  You can also try a half tab if needed.   - [employer] has drug savings card.  - blood pressure and pulse check at a [place] pharmacy in 1 week.  Have the results sent to me.

## 2019-08-13 NOTE — Clinical Note
I'm going to try a little bit of phentermine at 8 mg in the afternoon to see if this helps.  She can use any anorexigenic medication actually but we will start here. Thanks, Avinash

## 2019-08-13 NOTE — PROGRESS NOTES
INTERNAL MEDICINE  Medical Weight Loss - Initial Evaluation      Primary Care Physician: Tonya Mtz    Chief Complaint:   Chief Complaint   Patient presents with     Consult       Wt Readings from Last 5 Encounters:   19 106.4 kg (234 lb 8 oz)   19 108 kg (238 lb)   19 106.6 kg (235 lb)   19 104.8 kg (231 lb)   18 104.3 kg (230 lb)        HISTORY OF PRESENT ILLNESS (HPI):    Patient is 53 year old year old female who is here for medical weight loss initial evaluation.   The patient has had problems with her weight, starting in her teens and early 20s.    History:  Patient relates that she was always heavy weight. She played softball and volleyball her whole life. She notes that in college she was over 160 and up to about 180- gaining 1 pound a month, 10-12 pounds a year. She was diagnosed with breast cancer in  and they treated her with bilateral mastectomy and reconstruction. On her 5 year anniversary she motivated herself to lose weight. She notes that her sisters had weight loss surgery and they lost 150 and 170 pounds but they are gradually gaining back the weight again. She notes that she lost 100 at 190 after her gastric sleeve surgery at [facility] but started to gradually gain the weight and her weight plateau a year after the surgery. Her last weight loss clinic visit was a year ago. She notes that they don't really give new information to help her manage her weight loss. She relates that her sleep apnea went away after surgery. She was recently taken off her allergy medications a few months ago. Patient relates that she has been on tamoxifen for 9 years could contribute to the no weight loss and stress. She reports no weight complications other then the arthritis in her knees and denies any migraines. Patient denies any family history of pancreatic or thryoid cancer and has never had pancreatitis. She notes that her mother  of breast cancer and her dad had  some stomach polyps.      Diet:  Patient notes that she eats very little- even at her heaviest she didn't eat much. She notes to eating 3/4 of cup to one cup at a time. She has food cravings for snacks.    Breakfast: She eats cereal with almond milk and coffee with creamer  Lunch: She eats soup, cheese sticks, beef sticks, and apple  Dinner: she eats chicken or salmon and veggies  Late night Snacks: she eats potato chips (handful), cheese curds with crackers and a handful of M&Ms.  Snacks: she eats pretzels, cherries, and grapes.    Exercise Habits: Patient notes that she walks 3-5 miles around 4 times a week. She does yoga and barre classes about 4 times a week and lift weights a couple times a month.    Patient reports a pattern of gradual weight gain, with worsening of weight gain due to Stress.      She desires to lose weight to Be more active, Get healthier, Improve overall health and Improve self worth.    Lowest adult weight was 192 lbs after gastrectomy.  Highest adult weight was 292 lbs.   Patient feels her goal weight is 190 or less lbs.     Previous weight loss efforts: Exercise      Patient has had weight loss surgery - gastrectomy in 2015.  Patient has considered weight loss surgery.  Patient has not tried weight loss medication.  Patient has  Considered weight loss medication.    OBESITY RELATED COMORBIDITIES:   asthma    PAST SURGICAL HISTORY:   Past Surgical History:   Procedure Laterality Date     ABDOMEN SURGERY  Dec. 2014    Fat grafting for breast reconstruction     ABDOMEN SURGERY  6/17/15    Vertical sleeve gastrectomy     BREAST BIOPSY, RT/LT  2001    lt 2001, rt  2010     COLONOSCOPY  12.13.12    Repeat Colonoscopy in 5 yrs.     COSMETIC SURGERY  2003 and 2014     CRYOCAUTERY OF CERVIX      [place]     CYSTOSCOPY,DIL URETHRAL STRICTURE      age 7     HC COLONOSCOPY THRU STOMA W BIOPSY/CAUTERY TUMOR/POLYP/LESION  1996    Dr. Jackson 8/30/96, '01 Repeat 2008     HC COLONOSCOPY THRU STOMA,  DIAGNOSTIC  5/07          HC DILATION/CURETTAGE DIAG/THER NON OB  2006     HC TOOTH EXTRACTION W/FORCEP  1986     MASTECTOMY, BILATERAL  5/26/10    rt sided breast ca  with reconstruction     TONSILLECTOMY & ADENOIDECTOMY  2005       PAST MEDICAL HISTORY:  Past Medical History:   Diagnosis Date     Acne      Aortic root dilation (H) 6/16/2015     AR (allergic rhinitis)      Arthritis 2005    Knees     Asthma      Back pain      Bell's palsies 11/2008    Right     Breast cancer (H) 4/10    rt side. Dr. Chayo Chan.      Contraception      GERD (gastroesophageal reflux disease)      Hemorrhoids      Hyperlipidemia      Hypertension Feb. 2015     LGSIL (low grade squamous intraepithelial dysplasia) 2004     Malrotation colon      TMJ (dislocation of temporomandibular joint)        MEDICATIONS:   Current Outpatient Medications   Medication Sig Dispense Refill     albuterol (PROAIR HFA/PROVENTIL HFA/VENTOLIN HFA) 108 (90 Base) MCG/ACT inhaler Inhale 2 puffs into the lungs every 4 hours as needed for other (cough) 18 g 0     azelastine (ASTELIN) 0.1 % spray Spray 1-2 sprays into both nostrils 2 times daily as needed for rhinitis or allergies 1 Bottle 1     benzonatate (TESSALON) 100 MG capsule Take 1 capsule (100 mg) by mouth 3 times daily as needed for cough 30 capsule 0     CALCIUM CITRATE PO        CENTRUM OR TABS 1 TABLET DAILY       cholecalciferol (VITAMIN D) 1000 UNIT tablet Take 1 tablet by mouth 2 times daily.       CycloSPORINE (RESTASIS OP) Apply  to eye 2 times daily.       ipratropium (ATROVENT) 0.03 % nasal spray INSTILL 2 SPRAYS IN EACH NOSTRIL 3 TIMES DAILY AS NEEDED  2     phentermine (LOMAIRA) 8 MG tablet Take 1 tablet (8 mg) by mouth daily as needed (for appetite suppression) 30 tablet 0     tamoxifen (NOLVADEX) 20 MG tablet Take 20 mg by mouth daily.       vitamin  B complex with vitamin C (VITAMIN  B COMPLEX) TABS Take 1 tablet by mouth daily       montelukast (SINGULAIR) 10 MG tablet TAKE 1 TABLET  ONCE A DAY FOR ALLERGY AND COUGH CONTROLS.       Omega-3 Fatty Acids (FISH OIL) 1200 MG capsule Take 1 capsule by mouth daily.         ALLERGIES:   Allergies   Allergen Reactions     Sulfa Drugs Anaphylaxis     Eyes red and swollen     Adhesive Tape Rash     surgical tape  Other reaction(s): Hives     Hydrocodone-Acetaminophen Hives     Other reaction(s): Flushing, Hives  Liquid  Lortab only, reports pill with no issues       Penicillins Hives     Taxotere Hives       SOCIAL HISTORY:   Social History     Socioeconomic History     Marital status:      Spouse name: Not on file     Number of children: Not on file     Years of education: Not on file     Highest education level: Not on file   Occupational History     Not on file   Social Needs     Financial resource strain: Not on file     Food insecurity:     Worry: Not on file     Inability: Not on file     Transportation needs:     Medical: Not on file     Non-medical: Not on file   Tobacco Use     Smoking status: Never Smoker     Smokeless tobacco: Never Used   Substance and Sexual Activity     Alcohol use: Yes     Comment: 1-2 drinks a week     Drug use: No     Sexual activity: Yes     Partners: Male     Birth control/protection: IUD   Lifestyle     Physical activity:     Days per week: Not on file     Minutes per session: Not on file     Stress: Not on file   Relationships     Social connections:     Talks on phone: Not on file     Gets together: Not on file     Attends Holiness service: Not on file     Active member of club or organization: Not on file     Attends meetings of clubs or organizations: Not on file     Relationship status: Not on file     Intimate partner violence:     Fear of current or ex partner: Not on file     Emotionally abused: Not on file     Physically abused: Not on file     Forced sexual activity: Not on file   Other Topics Concern     Parent/sibling w/ CABG, MI or angioplasty before 65F 55M? No   Social History Narrative     ".  No kids.  Lives alone in [facility] ([place]  , full time.         FAMILY HISTORY:   Family History   Problem Relation Age of Onset     Obesity Mother      Breast Cancer Mother 57         at 60 of breast cancer     Colon Polyps Father         large polyps     Diabetes Father      Hypertension Father      Obesity Father      Unknown/Adopted Maternal Grandmother      Unknown/Adopted Maternal Grandfather      Cancer Paternal Grandmother         stomach     Obesity Sister      Obesity Sister      Breast Cancer Other        REVIEW OF SYSTEMS:   ROS: 10 point ROS neg other than the symptoms noted above in the HPI.   PSYCH: NEGATIVE for  anxiety or depression,  panic attacks or suicide attempts, emotional eating, binge eating, or h/o eating disorder or eating disorder treatment. Denies abuse.     This document serves as a record of the services and decisions personally performed and made by Aspen Pires MD. It was created on her behalf by Joni Samson, a trained medical scribe. The creation of this document is based on the provider's statements to the medical scribe.  Joni Samson 11:53 AM 2019    PHYSICAL EXAMINATION:    VITALS: /82 (BP Location: Left arm, Cuff Size: Adult Large)   Pulse 65   Temp 98.1  F (36.7  C) (Oral)   Resp 13   Ht 1.689 m (5' 6.5\")   Wt 106.4 kg (234 lb 8 oz)   SpO2 99%   BMI 37.28 kg/m    GENERAL: Patient is a 53 year old year old female is in no acute distress.  Patient is alert and orientated x 4, pleasant and cooperative with exam. Mood and affect are appropriate.  HEENT:  No conjunctival injection or icterus. Dentition WNL.  NECK: is supple without lymphadenopathy, thyroidmegaly, or mass.    CARDIOVASCULAR:  Regular rate and rhythm without murmurs, rubs, or gallops.  RESPIRATORY:  Lungs are clear to auscultation bilaterally, respiratory effort is normal.   GASTROINTESTINAL:  Abdomen is obese, soft, nontender, without obvious organomegaly or masses. "  Positive bowel sounds throughout. No bruits.  LOWER EXTREMITIES:  No edema, cyanosis, ulceration, or chronic venous stasis noted bilaterally.  NEUROLOGIC:  Cranial nerves II-XII grossly intact. Gait appears normal.   SKIN:  No intertiginous irritation or rash.    PSYCH:  Mentation appears normal, affect normal/ bright      LAB RESULTS:   Reviewed in Epic    ASSESSMENT/PLAN:    1. BMI 37.0-37.9, adult  Patient notes that her heaviest was 292 and lowest was 192. Her goal is 190.  She relates to having poor diet and exercise, although it isn't too bad in reality.  She perhaps does too much snacking but otherwise is doing well.   Plans was discussed with patient per patient instruction - patient agreed.  Patient will check blood pressure and pulses and send them to me via [employer].  Prescription was placed today.  She can essentially use any anorexigenic medication but will start with PRN phentermine.   Will follow-up.  - phentermine (LOMAIRA) 8 MG tablet; Take 1 tablet (8 mg) by mouth daily as needed (for appetite suppression)  Dispense: 30 tablet; Refill: 0    2. Bariatric surgery status  She notes to having trouble with sustaining weight loss after her weight loss surgery.  This is common and expected after weight loss surgery and typically is well treated with weight loss medication.   Plans was discussed with patient per patient instruction - patient agreed.  Prescription was placed today.  Will follow-up.  - phentermine (LOMAIRA) 8 MG tablet; Take 1 tablet (8 mg) by mouth daily as needed (for appetite suppression)  Dispense: 30 tablet; Refill: 0    3. Cancer of midline of right breast (H)  Patient reports to having a past history of breast cancer that was found in 2010.  She had bilateral mastectomy and reconstruction.    4. Primary osteoarthritis of both knees  Patient notes arthritis in her knees.      Patient Instructions   - Consider Phentermine in the afternoon or evening- is a low dose to use as needed to  suppress cravings. Avoid taking it with coffee.  You can also try a half tab if needed.   - [employer] has drug savings card.  - blood pressure and pulse check at a [place] pharmacy in 1 week.  Have the results sent to me.       The information in this document, created by the medical scribe for me, accurately reflects the services I personally performed and the decisions made by me. I have reviewed and approved this document for accuracy prior to leaving the patient care area.  August 13, 2019 11:53 AM     I spent 20 minutes of time with the patient and >50% of it was in education and counseling regarding weight management.     Start 11:53 AM  End 12:13 PM

## 2019-09-06 ENCOUNTER — TRANSFERRED RECORDS (OUTPATIENT)
Dept: HEALTH INFORMATION MANAGEMENT | Facility: CLINIC | Age: 54
End: 2019-09-06

## 2019-09-10 NOTE — PROGRESS NOTES
INTERNAL MEDICINE  Medical Weight Loss - Return Visit      CHIEF COMPLAINT:  Recheck weight      Wt Readings from Last 5 Encounters:   09/13/19 108.2 kg (238 lb 8 oz)   08/13/19 106.4 kg (234 lb 8 oz)   08/01/19 108 kg (238 lb)   05/25/19 106.6 kg (235 lb)   01/27/19 104.8 kg (231 lb)     Patient Instructions on Last Visit 8/13/2019:  Patient Instructions   - Consider Phentermine in the afternoon or evening- is a low dose to use as needed to suppress cravings. Avoid taking it with coffee.  You can also try a half tab if needed.   - [employer] has drug savings card.  - blood pressure and pulse check at a [place] pharmacy in 1 week.  Have the results sent to me.       HISTORY OF PRESENT ILLNESS (HPI):    Patient returns today for medical weight loss follow-up visit. Patient was last seen by me on 8/13/2019 and has gained 4 pounds and lost 1 % body fat.     Urinary Frequency  Patient relates that she gained three pounds. She thinks she may have a bladder infection and wonders if it is a side effect of the Lomaira. She has frequent urinaion and notes thinner stream. She denies any burning sensation, hematuria, fevers, chills, or back pain. She notes good bowel movements. She relates that she skipped a day of Lomaira a few times and did not notice any changes to her urination. On the first day of taking Lomaira, she notes to having symptoms of jitteriness but denies any heart racing.    Exercise - She exercises everyday. She does yoga almost everyday and walks 4x a day. She walks in the skyways at work. She is considering lifting weights again for strength training.    MEDICATIONS:   Current Outpatient Medications   Medication Sig Dispense Refill     albuterol (PROAIR HFA/PROVENTIL HFA/VENTOLIN HFA) 108 (90 Base) MCG/ACT inhaler Inhale 2 puffs into the lungs every 4 hours as needed for other (cough) 18 g 0     benzonatate (TESSALON) 100 MG capsule Take 1 capsule (100 mg) by mouth 3 times daily as needed for cough 30  "capsule 0     CALCIUM CITRATE PO        CENTRUM OR TABS 1 TABLET DAILY       cholecalciferol (VITAMIN D) 1000 UNIT tablet Take 1 tablet by mouth 2 times daily.       Cyanocobalamin (VITAMIN B 12 PO)        CycloSPORINE (RESTASIS OP) Apply  to eye 2 times daily.       tamoxifen (NOLVADEX) 20 MG tablet Take 20 mg by mouth daily.       azelastine (ASTELIN) 0.1 % spray Spray 1-2 sprays into both nostrils 2 times daily as needed for rhinitis or allergies (Patient not taking: Reported on 9/13/2019) 1 Bottle 1     ipratropium (ATROVENT) 0.03 % nasal spray INSTILL 2 SPRAYS IN EACH NOSTRIL 3 TIMES DAILY AS NEEDED  2     montelukast (SINGULAIR) 10 MG tablet TAKE 1 TABLET ONCE A DAY FOR ALLERGY AND COUGH CONTROLS.       Omega-3 Fatty Acids (FISH OIL) 1200 MG capsule Take 1 capsule by mouth daily.       vitamin  B complex with vitamin C (VITAMIN  B COMPLEX) TABS Take 1 tablet by mouth daily         ALLERGIES:   Allergies   Allergen Reactions     Sulfa Drugs Anaphylaxis     Eyes red and swollen     Adhesive Tape Rash     surgical tape  Other reaction(s): Hives     Hydrocodone-Acetaminophen Hives     Other reaction(s): Flushing, Hives  Liquid  Lortab only, reports pill with no issues       Penicillins Hives     Taxotere Hives     ROS   ROS: 10 point ROS neg other than the symptoms noted above in the HPI.    This document serves as a record of the services and decisions personally performed and made by Aspen Pires MD. It was created on her behalf by Joni Samson, a trained medical scribe. The creation of this document is based on the provider's statements to the medical scribe.  Joni Samson 7:26 AM September 13, 2019    PHYSICAL EXAMINATION:    VITALS: /78   Pulse 74   Temp 97  F (36.1  C) (Oral)   Resp 16   Ht 1.707 m (5' 7.21\")   Wt 108.2 kg (238 lb 8 oz)   SpO2 100%   BMI 37.13 kg/m    GENERAL: Patient is a 53 year old year old female  in no acute distress.  Patient is alert and orientated x 4, pleasant and cooperative " with exam.     RESP: lungs clear to auscultation - no rales, rhonchi or wheezes  CV: regular rate and rhythm, normal S1 S2, no S3 or S4, no murmur, click or rub, no peripheral edema and peripheral pulses strong  ABDOMEN: soft, nontender, no hepatosplenomegaly, no masses and bowel sounds normal  MS: no gross musculoskeletal defects noted, no edema  PSYCH: mentation appears normal, affect normal and bright.  No cva tenderness    LAB RESULTS:   Reviewed in Epic    ASSESSMENT/PLAN:    1. Urinary frequency  Patient notes urine frequency but doesn't have classic signs of a uti  Urine test checked today.  Patient was advised to stop Phentermine to see if symptoms persists.  Patient will follow-up with her PCP about bladder symptoms.  Will start new medication if symptoms resolve- per patient instructions.  Patient will update me next week about bladder symptoms.   Follow-up in 6 weeks for weight recheck.  Will continue to monitor.  - *UA reflex to Microscopic and Culture ([facility] (except [place] and [place])    Weight - Patient is doing well with exercise and diet. I would like to see more improvements on weight loss. I encouraged continual exercises and strength training. Will consider other medications once patient resolves bladder symptoms.       Patient is to call the clinic if she experiences any adverse reactions.    Patient Instructions   Stop Phentermine.    If you don't have an infection and symptoms persist despite being off phentermine, follow up with your primary doctor for your bladder symptoms.  We can also restart phentermine at the 15 mg dose at that time.    If symptoms resolve off phentermine, then we can try a different medication.  See if Belviq, Contrave or Saxenda are covered by your insurance.     Update me next week.       The information in this document, created by the medical scribe for me, accurately reflects the services I personally performed and the decisions made by  me. I have reviewed and approved this document for accuracy prior to leaving the patient care area.  September 13, 2019 7:27 AM    I spent 10 minutes of time with the patient and >50% of it was in education and counseling regarding weight management, health maintenance and medication recheck.     Start: 7:22 AM  End 7:32 AM    Aspen Pires MD  Internal Medicine    American Board of Obesity Medicine Diplomate

## 2019-09-13 ENCOUNTER — OFFICE VISIT (OUTPATIENT)
Dept: INTERNAL MEDICINE | Facility: CLINIC | Age: 54
End: 2019-09-13
Payer: COMMERCIAL

## 2019-09-13 VITALS
BODY MASS INDEX: 37.43 KG/M2 | OXYGEN SATURATION: 100 % | TEMPERATURE: 97 F | SYSTOLIC BLOOD PRESSURE: 130 MMHG | WEIGHT: 238.5 LBS | DIASTOLIC BLOOD PRESSURE: 78 MMHG | RESPIRATION RATE: 16 BRPM | HEIGHT: 67 IN | HEART RATE: 74 BPM

## 2019-09-13 DIAGNOSIS — R35.0 URINARY FREQUENCY: Primary | ICD-10-CM

## 2019-09-13 LAB
ALBUMIN UR-MCNC: NEGATIVE MG/DL
APPEARANCE UR: CLEAR
BILIRUB UR QL STRIP: NEGATIVE
COLOR UR AUTO: YELLOW
GLUCOSE UR STRIP-MCNC: NEGATIVE MG/DL
HGB UR QL STRIP: NEGATIVE
KETONES UR STRIP-MCNC: NEGATIVE MG/DL
LEUKOCYTE ESTERASE UR QL STRIP: NEGATIVE
NITRATE UR QL: NEGATIVE
PH UR STRIP: 7 PH (ref 5–7)
SOURCE: NORMAL
SP GR UR STRIP: 1.01 (ref 1–1.03)
UROBILINOGEN UR STRIP-ACNC: 0.2 EU/DL (ref 0.2–1)

## 2019-09-13 PROCEDURE — 99213 OFFICE O/P EST LOW 20 MIN: CPT | Performed by: INTERNAL MEDICINE

## 2019-09-13 PROCEDURE — 81003 URINALYSIS AUTO W/O SCOPE: CPT | Performed by: INTERNAL MEDICINE

## 2019-09-13 ASSESSMENT — MIFFLIN-ST. JEOR: SCORE: 1732.71

## 2019-09-13 ASSESSMENT — PAIN SCALES - GENERAL: PAINLEVEL: NO PAIN (0)

## 2019-09-13 NOTE — PATIENT INSTRUCTIONS
Stop Phentermine.    If you don't have an infection and symptoms persist despite being off phentermine, follow up with your primary doctor for your bladder symptoms.  We can also restart phentermine at the 15 mg dose at that time.    If symptoms resolve off phentermine, then we can try a different medication.  See if Belviq, Contrave or Saxenda are covered by your insurance.     Update me next week.

## 2019-09-23 ENCOUNTER — APPOINTMENT (OUTPATIENT)
Dept: VASCULAR SURGERY | Facility: CLINIC | Age: 54
End: 2019-09-23

## 2019-09-23 PROCEDURE — 36468 NJX SCLRSNT SPIDER VEINS: CPT | Performed by: SURGERY

## 2019-09-23 PROCEDURE — S9999 SALES TAX: HCPCS | Performed by: SURGERY

## 2019-11-04 ENCOUNTER — APPOINTMENT (OUTPATIENT)
Dept: VASCULAR SURGERY | Facility: CLINIC | Age: 54
End: 2019-11-04

## 2019-11-04 PROCEDURE — S9999 SALES TAX: HCPCS | Performed by: SURGERY

## 2019-11-04 PROCEDURE — 36468 NJX SCLRSNT SPIDER VEINS: CPT | Performed by: SURGERY

## 2019-11-08 ENCOUNTER — HEALTH MAINTENANCE LETTER (OUTPATIENT)
Age: 54
End: 2019-11-08

## 2020-06-30 LAB
HEP C HIM: NORMAL
HIV 1&2 EXT: NORMAL

## 2020-08-05 ENCOUNTER — TRANSFERRED RECORDS (OUTPATIENT)
Dept: HEALTH INFORMATION MANAGEMENT | Facility: CLINIC | Age: 55
End: 2020-08-05

## 2020-08-10 ENCOUNTER — OFFICE VISIT (OUTPATIENT)
Dept: FAMILY MEDICINE | Facility: CLINIC | Age: 55
End: 2020-08-10
Payer: COMMERCIAL

## 2020-08-10 VITALS
HEART RATE: 63 BPM | OXYGEN SATURATION: 97 % | HEIGHT: 67 IN | WEIGHT: 243 LBS | DIASTOLIC BLOOD PRESSURE: 85 MMHG | SYSTOLIC BLOOD PRESSURE: 141 MMHG | BODY MASS INDEX: 38.14 KG/M2 | TEMPERATURE: 99.1 F

## 2020-08-10 DIAGNOSIS — D12.6 SERRATED ADENOMA OF COLON: ICD-10-CM

## 2020-08-10 DIAGNOSIS — Z12.11 COLON CANCER SCREENING: ICD-10-CM

## 2020-08-10 DIAGNOSIS — E66.01 SEVERE OBESITY WITH BODY MASS INDEX (BMI) OF 35.0 TO 39.9 WITH SERIOUS COMORBIDITY (H): ICD-10-CM

## 2020-08-10 DIAGNOSIS — J45.20 MILD INTERMITTENT ASTHMA WITHOUT COMPLICATION: ICD-10-CM

## 2020-08-10 DIAGNOSIS — Z00.00 ROUTINE GENERAL MEDICAL EXAMINATION AT A HEALTH CARE FACILITY: Primary | ICD-10-CM

## 2020-08-10 DIAGNOSIS — Z79.810 LONG-TERM CURRENT USE OF TAMOXIFEN: ICD-10-CM

## 2020-08-10 PROCEDURE — 99396 PREV VISIT EST AGE 40-64: CPT | Performed by: INTERNAL MEDICINE

## 2020-08-10 ASSESSMENT — ENCOUNTER SYMPTOMS
SORE THROAT: 0
HEADACHES: 0
EYE PAIN: 0
BREAST MASS: 0
ABDOMINAL PAIN: 0
JOINT SWELLING: 1
DIARRHEA: 0
COUGH: 0
DIZZINESS: 0
HEMATURIA: 0
CHILLS: 0
FREQUENCY: 0
SHORTNESS OF BREATH: 0
HEMATOCHEZIA: 0
NERVOUS/ANXIOUS: 0
DYSURIA: 0
CONSTIPATION: 0
PALPITATIONS: 0
HEARTBURN: 0
NAUSEA: 0
PARESTHESIAS: 0
MYALGIAS: 0
WEAKNESS: 0
ARTHRALGIAS: 1
FEVER: 0

## 2020-08-10 ASSESSMENT — MIFFLIN-ST. JEOR: SCORE: 1744.87

## 2020-08-10 NOTE — PATIENT INSTRUCTIONS

## 2020-08-10 NOTE — PROGRESS NOTES
SUBJECTIVE:   CC: Jenae Miller is an 54 year old woman who presents for preventive health visit.     Healthy Habits:     Getting at least 3 servings of Calcium per day:  Yes    Bi-annual eye exam:  Yes    Dental care twice a year:  Yes    Sleep apnea or symptoms of sleep apnea:  None    Diet:  Regular (no restrictions)    Frequency of exercise:  4-5 days/week    Duration of exercise:  45-60 minutes    Taking medications regularly:  Yes    Medication side effects:  None    PHQ-2 Total Score: 0    Additional concerns today:  No    55 y/o  F here for AFE.  H/o cervical dysplasia (PAP via OBGYN, [facility]),  RITU (resolved), Br Ca (bilateral mastectomy 2010), MO (BMI 38), djd knees, TMJ (mouth guard), intermittent asthma, Sleeve gastrectomy (2015, weight loss ), IUD in place.     Weight has increased 13# since 2018....     Has small condyloma recurrences .         Today's PHQ-2 Score:   PHQ-2 ( 1999 Pfizer) 8/10/2020   Q1: Little interest or pleasure in doing things 0   Q2: Feeling down, depressed or hopeless 0   PHQ-2 Score 0   Q1: Little interest or pleasure in doing things Not at all   Q2: Feeling down, depressed or hopeless Not at all   PHQ-2 Score 0       Abuse: Current or Past(Physical, Sexual or Emotional)- No  Do you feel safe in your environment? Yes        Social History     Tobacco Use     Smoking status: Never Smoker     Smokeless tobacco: Never Used   Substance Use Topics     Alcohol use: Yes     Comment: 1-2 drinks a week     If you drink alcohol do you typically have >3 drinks per day or >7 drinks per week? No    Alcohol Use 8/10/2020   Prescreen: >3 drinks/day or >7 drinks/week? No   Prescreen: >3 drinks/day or >7 drinks/week? -       Reviewed orders with patient.  Reviewed health maintenance and updated orders accordingly - Yes  Lab work is in process  Labs reviewed in EPIC  BP Readings from Last 3 Encounters:   08/10/20 (!) 141/85   09/13/19 130/78   08/13/19 134/82    Wt Readings from  Last 3 Encounters:   08/10/20 110.2 kg (243 lb)   19 108.2 kg (238 lb 8 oz)   19 106.4 kg (234 lb 8 oz)                  Patient Active Problem List   Diagnosis     CARDIOVASCULAR SCREENING; LDL GOAL LESS THAN 160     Family history of breast cancer in mother     Varicose veins of legs     Colon polyp     Thrush, oral     Intermittent asthma     Congenital anomaly of fixation of intestine     Bariatric surgery status     Cancer of midline of right breast (H)     Serrated adenoma of colon     overweight HCC     Status post mastectomy, bilateral     Primary osteoarthritis of both knees     Past Surgical History:   Procedure Laterality Date     ABDOMEN SURGERY  Dec. 2014    Fat grafting for breast reconstruction     ABDOMEN SURGERY  6/17/15    Vertical sleeve gastrectomy     BREAST BIOPSY, RT/LT      lt , rt       COLONOSCOPY  12    Repeat Colonoscopy in 5 yrs.     COSMETIC SURGERY   and      CRYOCAUTERY OF CERVIX      [place]     CYSTOSCOPY,DIL URETHRAL STRICTURE      age 7     HC COLONOSCOPY THRU STOMA W BIOPSY/CAUTERY TUMOR/POLYP/LESION      Dr. Jackson 96, '01 Repeat      HC COLONOSCOPY THRU STOMA, DIAGNOSTIC            HC DILATION/CURETTAGE DIAG/THER NON OB       HC TOOTH EXTRACTION W/FORCEP  1986     MASTECTOMY, BILATERAL  5/26/10    rt sided breast ca  with reconstruction     TONSILLECTOMY & ADENOIDECTOMY  2005       Social History     Tobacco Use     Smoking status: Never Smoker     Smokeless tobacco: Never Used   Substance Use Topics     Alcohol use: Yes     Comment: 1-2 drinks a week     Family History   Problem Relation Age of Onset     Obesity Mother      Breast Cancer Mother 57         at 60 of breast cancer     Colon Polyps Father         large polyps     Diabetes Father      Hypertension Father      Obesity Father      Unknown/Adopted Maternal Grandmother      Unknown/Adopted Maternal Grandfather      Cancer Paternal Grandmother         stomach      Obesity Sister      Obesity Sister      Breast Cancer Other          Current Outpatient Medications   Medication Sig Dispense Refill     albuterol (PROAIR HFA/PROVENTIL HFA/VENTOLIN HFA) 108 (90 Base) MCG/ACT inhaler Inhale 2 puffs into the lungs every 4 hours as needed for other (cough) 18 g 0     CALCIUM CITRATE PO        CENTRUM OR TABS 1 TABLET DAILY       cholecalciferol (VITAMIN D) 1000 UNIT tablet Take 1 tablet by mouth 2 times daily.       Cyanocobalamin (VITAMIN B 12 PO)        CycloSPORINE (RESTASIS OP) Apply  to eye 2 times daily.       Omega-3 Fatty Acids (FISH OIL) 1200 MG capsule Take 1 capsule by mouth daily.       tamoxifen (NOLVADEX) 20 MG tablet Take 20 mg by mouth daily.       Allergies   Allergen Reactions     Sulfa Drugs Anaphylaxis     Eyes red and swollen     Adhesive Tape Rash     surgical tape  Other reaction(s): Hives     Hydrocodone-Acetaminophen Hives     Other reaction(s): Flushing, Hives  Liquid  Lortab only, reports pill with no issues       Penicillins Hives     Taxotere Hives     Recent Labs   Lab Test 11/23/15  1854 08/28/15 02/27/15 02/21/14  01/23/13  0910   LDL  --   --   --   --   --  78   HDL  --   --   --   --   --  86   TRIG  --   --   --   --   --  136   ALT  --  14 15 17   < >  --    CR 0.89  --   --   --   --   --    GFRESTIMATED 68  --   --   --   --   --    GFRESTBLACK 82  --   --   --   --   --    POTASSIUM 3.9  --   --   --   --   --    TSH  --   --  4.84 2.43  --  3.38    < > = values in this interval not displayed.        Alternate mammogram schedule due to breast cancer history -- possible MRI via oncology    Pertinent mammograms are reviewed under the imaging tab.  History of abnormal Pap smear: Last 3 Pap Results: No results found for: PAP     Reviewed and updated as needed this visit by clinical staff         Reviewed and updated as needed this visit by Provider          Past Medical History:   Diagnosis Date     Acne      Aortic root dilation (H) 6/16/2015      AR (allergic rhinitis)      Arthritis 2005    Knees     Asthma      Back pain      Bell's palsies 11/2008    Right     Breast cancer (H) 4/10    rt side. Dr. Chayo Chan.      Contraception      GERD (gastroesophageal reflux disease)      Hemorrhoids      Hyperlipidemia      Hypertension Feb. 2015     LGSIL (low grade squamous intraepithelial dysplasia) 2004     Malrotation colon      TMJ (dislocation of temporomandibular joint)       Past Surgical History:   Procedure Laterality Date     ABDOMEN SURGERY  Dec. 2014    Fat grafting for breast reconstruction     ABDOMEN SURGERY  6/17/15    Vertical sleeve gastrectomy     BREAST BIOPSY, RT/LT  2001    lt 2001, rt  2010     COLONOSCOPY  12.13.12    Repeat Colonoscopy in 5 yrs.     COSMETIC SURGERY  2003 and 2014     CRYOCAUTERY OF CERVIX      [place]     CYSTOSCOPY,DIL URETHRAL STRICTURE      age 7     HC COLONOSCOPY THRU STOMA W BIOPSY/CAUTERY TUMOR/POLYP/LESION  1996    Dr. Jackson 8/30/96, '01 Repeat 2008     HC COLONOSCOPY THRU STOMA, DIAGNOSTIC  5/07          HC DILATION/CURETTAGE DIAG/THER NON OB  2006     HC TOOTH EXTRACTION W/FORCEP  1986     MASTECTOMY, BILATERAL  5/26/10    rt sided breast ca  with reconstruction     TONSILLECTOMY & ADENOIDECTOMY  2005       Review of Systems   Constitutional: Negative for chills and fever.   HENT: Negative for congestion, ear pain, hearing loss and sore throat.    Eyes: Negative for pain and visual disturbance.   Respiratory: Negative for cough and shortness of breath.    Cardiovascular: Negative for chest pain, palpitations and peripheral edema.   Gastrointestinal: Negative for abdominal pain, constipation, diarrhea, heartburn, hematochezia and nausea.   Breasts:  Negative for tenderness, breast mass and discharge.   Genitourinary: Negative for dysuria, frequency, genital sores, hematuria, pelvic pain, urgency, vaginal bleeding and vaginal discharge.   Musculoskeletal: Positive for arthralgias and joint swelling. Negative for  "myalgias.   Skin: Negative for rash.   Neurological: Negative for dizziness, weakness, headaches and paresthesias.   Psychiatric/Behavioral: Negative for mood changes. The patient is not nervous/anxious.            OBJECTIVE:   BP (!) 141/85 (BP Location: Right arm, Patient Position: Sitting, Cuff Size: Adult Large)   Pulse 63   Temp 99.1  F (37.3  C) (Oral)   Ht 1.702 m (5' 7\")   Wt 110.2 kg (243 lb)   SpO2 97%   BMI 38.06 kg/m    Physical Exam  GENERAL APPEARANCE: healthy, alert and no distress  EYES: Eyes grossly normal to inspection, PERRL and conjunctivae and sclerae normal  HENT: ear canals and TM's normal, nose and mouth without ulcers or lesions, oropharynx clear and oral mucous membranes moist  NECK: no adenopathy, no asymmetry, masses, or scars and thyroid normal to palpation  RESP: lungs clear to auscultation - no rales, rhonchi or wheezes  BREAST: normal without masses, tenderness or nipple discharge and no palpable axillary masses or adenopathy  BREAST:  Bilateral mastectomy  CV: regular rate and rhythm, normal S1 S2, no S3 or S4, no murmur, click or rub, no peripheral edema and peripheral pulses strong  ABDOMEN: soft, nontender, no hepatosplenomegaly, no masses and bowel sounds normal   (female): normal female external genitalia, normal urethral meatus, vaginal mucosal atrophy noted, normal cervix, adnexae, and uterus without masses or abnormal discharge   (female): bimanual only    Scattered, very small verrucae on labia.  She will have this removed via GYN.   MS: no musculoskeletal defects are noted and gait is age appropriate without ataxia  SKIN: no suspicious lesions or rashes  NEURO: Normal strength and tone, sensory exam grossly normal, mentation intact and speech normal  PSYCH: mentation appears normal and affect normal/bright    Diagnostic Test Results:  Labs reviewed in Epic  No results found for this or any previous visit (from the past 24 hour(s)).    ASSESSMENT/PLAN:       " "ICD-10-CM    1. Routine general medical examination at a health care facility  Z00.00    2. overweight HCC  E66.01    3. Serrated adenoma of colon  D12.6 GASTROENTEROLOGY ADULT REF PROCEDURE ONLY   4. Long-term current use of tamoxifen  Z79.810    5. Mild intermittent asthma without complication  J45.20 Asthma Action Plan (AAP)   6. Colon cancer screening  Z12.11 GASTROENTEROLOGY ADULT REF PROCEDURE ONLY       COUNSELING:  Reviewed preventive health counseling, as reflected in patient instructions       Regular exercise       Osteoporosis Prevention/Bone Health    Estimated body mass index is 37.13 kg/m  as calculated from the following:    Height as of 9/13/19: 1.707 m (5' 7.21\").    Weight as of 9/13/19: 108.2 kg (238 lb 8 oz).    Weight management plan: Discussed healthy diet and exercise guidelines     reports that she has never smoked. She has never used smokeless tobacco.      Counseling Resources:  ATP IV Guidelines  Pooled Cohorts Equation Calculator  Breast Cancer Risk Calculator  FRAX Risk Assessment  ICSI Preventive Guidelines  Dietary Guidelines for Americans, 2010  USDA's MyPlate  ASA Prophylaxis  Lung CA Screening    Tonya Mtz MD  "

## 2020-08-10 NOTE — LETTER
My Asthma Action Plan    Name: Jenae Miller   YOB: 1965  Date: 8/10/2020   My doctor: Tonya Mtz MD   My clinic: [facility]        My Rescue Medicine:   Albuterol inhaler (Proair/Ventolin/Proventil HFA)  2-4 puffs EVERY 4 HOURS as needed. Use a spacer if recommended by your provider.   My Asthma Severity:   Intermittent / Exercise Induced  Know your asthma triggers: upper respiratory infections, exercise or sports and cold air             GREEN ZONE   Good Control    I feel good    No cough or wheeze    Can work, sleep and play without asthma symptoms       Take your asthma control medicine every day.     1. If exercise triggers your asthma, take your rescue medication    15 minutes before exercise or sports, and    During exercise if you have asthma symptoms  2. Spacer to use with inhaler: If you have a spacer, make sure to use it with your inhaler             YELLOW ZONE Getting Worse  I have ANY of these:    I do not feel good    Cough or wheeze    Chest feels tight    Wake up at night   1. Keep taking your Green Zone medications  2. Start taking your rescue medicine:    every 20 minutes for up to 1 hour. Then every 4 hours for 24-48 hours.  3. If you stay in the Yellow Zone for more than 12-24 hours, contact your doctor.  4. If you do not return to the Green Zone in 12-24 hours or you get worse, start taking your oral steroid medicine if prescribed by your provider.           RED ZONE Medical Alert - Get Help  I have ANY of these:    I feel awful    Medicine is not helping    Breathing getting harder    Trouble walking or talking    Nose opens wide to breathe       1. Take your rescue medicine NOW  2. If your provider has prescribed an oral steroid medicine, start taking it NOW  3. Call your doctor NOW  4. If you are still in the Red Zone after 20 minutes and you have not reached your doctor:    Take your rescue medicine again and    Call 911 or go to the  emergency room right away    See your regular doctor within 2 weeks of an Emergency Room or Urgent Care visit for follow-up treatment.          Annual Reminders:  Meet with Asthma Educator,  Flu Shot in the Fall, consider Pneumonia Vaccination for patients with asthma (aged 19 and older).    [facility] [id number] [place] [facility] [address] [address]    Electronically signed by Tonya Mtz MD   Date: 08/10/20                    Asthma Triggers  How To Control Things That Make Your Asthma Worse    Triggers are things that make your asthma worse.  Look at the list below to help you find your triggers and   what you can do about them. You can help prevent asthma flare-ups by staying away from your triggers.      Trigger                                                          What you can do   Cigarette Smoke  Tobacco smoke can make asthma worse. Do not allow smoking in your home, car or around you.  Be sure no one smokes at a child s day care or school.  If you smoke, ask your health care provider for ways to help you quit.  Ask family members to quit too.  Ask your health care provider for a referral to Quit Plan to help you quit smoking, or call 0-619-098-PLAN.     Colds, Flu, Bronchitis  These are common triggers of asthma. Wash your hands often.  Don t touch your eyes, nose or mouth.  Get a flu shot every year.     Dust Mites  These are tiny bugs that live in cloth or carpet. They are too small to see. Wash sheets and blankets in hot water every week.   Encase pillows and mattress in dust mite proof covers.  Avoid having carpet if you can. If you have carpet, vacuum weekly.   Use a dust mask and HEPA vacuum.   Pollen and Outdoor Mold  Some people are allergic to trees, grass, or weed pollen, or molds. Try to keep your windows closed.  Limit time out doors when pollen count is high.   Ask you health care provider about taking medicine during allergy season.     Animal Dander  Some people are allergic to  skin flakes, urine or saliva from pets with fur or feathers. Keep pets with fur or feathers out of your home.    If you can t keep the pet outdoors, then keep the pet out of your bedroom.  Keep the bedroom door closed.  Keep pets off cloth furniture and away from stuffed toys.     Mice, Rats, and Cockroaches  Some people are allergic to the waste from these pests.   Cover food and garbage.  Clean up spills and food crumbs.  Store grease in the refrigerator.   Keep food out of the bedroom.   Indoor Mold  This can be a trigger if your home has high moisture. Fix leaking faucets, pipes, or other sources of water.   Clean moldy surfaces.  Dehumidify basement if it is damp and smelly.   Smoke, Strong Odors, and Sprays  These can reduce air quality. Stay away from strong odors and sprays, such as perfume, powder, hair spray, paints, smoke incense, paint, cleaning products, candles and new carpet.   Exercise or Sports  Some people with asthma have this trigger. Be active!  Ask your doctor about taking medicine before sports or exercise to prevent symptoms.    Warm up for 5-10 minutes before and after sports or exercise.     Other Triggers of Asthma  Cold air:  Cover your nose and mouth with a scarf.  Sometimes laughing or crying can be a trigger.  Some medicines and food can trigger asthma.

## 2020-08-11 ASSESSMENT — ASTHMA QUESTIONNAIRES: ACT_TOTALSCORE: 25

## 2020-08-25 ENCOUNTER — TRANSFERRED RECORDS (OUTPATIENT)
Dept: HEALTH INFORMATION MANAGEMENT | Facility: CLINIC | Age: 55
End: 2020-08-25

## 2020-09-08 ENCOUNTER — TRANSFERRED RECORDS (OUTPATIENT)
Dept: HEALTH INFORMATION MANAGEMENT | Facility: CLINIC | Age: 55
End: 2020-09-08

## 2020-09-25 ENCOUNTER — TRANSFERRED RECORDS (OUTPATIENT)
Dept: HEALTH INFORMATION MANAGEMENT | Facility: CLINIC | Age: 55
End: 2020-09-25

## 2020-11-23 ENCOUNTER — VIRTUAL VISIT (OUTPATIENT)
Dept: FAMILY MEDICINE | Facility: CLINIC | Age: 55
End: 2020-11-23
Payer: COMMERCIAL

## 2020-11-23 DIAGNOSIS — Z85.3 HISTORY OF BREAST CANCER: ICD-10-CM

## 2020-11-23 DIAGNOSIS — J20.9 ACUTE BRONCHITIS WITH COEXISTING CONDITION, NEED PROPHYLACTIC THERAPY: ICD-10-CM

## 2020-11-23 DIAGNOSIS — J45.20 INTERMITTENT ASTHMA, UNCOMPLICATED: ICD-10-CM

## 2020-11-23 PROCEDURE — 99214 OFFICE O/P EST MOD 30 MIN: CPT | Mod: 95 | Performed by: INTERNAL MEDICINE

## 2020-11-23 RX ORDER — TOPIRAMATE 25 MG/1
25 TABLET, FILM COATED ORAL 2 TIMES DAILY
COMMUNITY
End: 2021-03-12

## 2020-11-23 RX ORDER — ALBUTEROL SULFATE 90 UG/1
2 AEROSOL, METERED RESPIRATORY (INHALATION) EVERY 6 HOURS PRN
Qty: 3 INHALER | Refills: 1 | Status: SHIPPED | OUTPATIENT
Start: 2020-11-23 | End: 2023-05-22

## 2020-11-23 RX ORDER — AZITHROMYCIN 250 MG/1
TABLET, FILM COATED ORAL
Qty: 6 TABLET | Refills: 1 | Status: SHIPPED | OUTPATIENT
Start: 2020-11-23 | End: 2021-03-12

## 2020-11-23 NOTE — PROGRESS NOTES
"Jenae Miller is a 55 year old female who is being evaluated via a billable telephone visit.      The patient has been notified of following:     \"This telephone visit will be conducted via a call between you and your physician/provider. We have found that certain health care needs can be provided without the need for a physical exam.  This service lets us provide the care you need with a short phone conversation.  If a prescription is necessary we can send it directly to your pharmacy.  If lab work is needed we can place an order for that and you can then stop by our lab to have the test done at a later time.    Telephone visits are billed at different rates depending on your insurance coverage. During this emergency period, for some insurers they may be billed the same as an in-person visit.  Please reach out to your insurance provider with any questions.    If during the course of the call the physician/provider feels a telephone visit is not appropriate, you will not be charged for this service.\"    Patient has given verbal consent for Telephone visit?  Yes    What phone number would you like to be contacted at? [phone]    How would you like to obtain your AVS? Joe King     Jenae Miller is a 55 year old female who presents via phone visit today for the following health issues:    HPI       Concern for COVID-19  About how many days ago did these symptoms start? 5-6 weeks  Is this your first visit for this illness? Yes  In the 14 days before your symptoms started, have you had close contact with someone with COVID-19 (Coronavirus)? No  Do you have a fever or chills? No  Are you having new or worsening difficulty breathing? No  Do you have new or worsening cough? Yes, I am coughing up mucus.  Have you had any new or unexplained body aches? No    Have you experienced any of the following NEW symptoms?    Headache: No    Sore throat: No    Loss of taste or smell: No    Chest pain: " No    Diarrhea: No    Rash: No  What treatments have you tried? Airborn, elderberry syrup  Who do you live with? Alone  Are you, or a household member, a healthcare worker or a ? No  Do you live in a nursing home, group home, or shelter? No  Do you have a way to get food/medications if quarantined? Yes friends           she is hoping to get Rx antibiotic.  But symptoms 5-6 weeks now. She has been quarantining.  Working from home.          Review of Systems   Constitutional, HEENT, cardiovascular, pulmonary, gi and gu systems are negative, except as otherwise noted.       Objective          Vitals:  No vitals were obtained today due to virtual visit.    healthy, alert and no distress  PSYCH: Alert and oriented times 3; coherent speech, normal   rate and volume, able to articulate logical thoughts, able   to abstract reason, no tangential thoughts, no hallucinations   or delusions  Her affect is normal  RESP: No cough, no audible wheezing, able to talk in full sentences  Remainder of exam unable to be completed due to telephone visits             Assessment/Plan:    Assessment & Plan     Diagnoses and all orders for this visit:    Acute bronchitis with coexisting condition, need prophylactic therapy  -     azithromycin (ZITHROMAX) 250 MG tablet; Take  by mouth for 5 days. Two tablets first day, then one tablet daily for four days    History of breast cancer  -     azithromycin (ZITHROMAX) 250 MG tablet; Take  by mouth for 5 days. Two tablets first day, then one tablet daily for four days    Intermittent asthma, uncomplicated  -     albuterol (PROAIR HFA/PROVENTIL HFA/VENTOLIN HFA) 108 (90 Base) MCG/ACT inhaler; Inhale 2 puffs into the lungs every 6 hours as needed for shortness of breath / dyspnea or wheezing                 No follow-ups on file.    Tonya Mtz MD  [facility]    Phone call duration:  9 minutes

## 2020-12-06 ENCOUNTER — HEALTH MAINTENANCE LETTER (OUTPATIENT)
Age: 55
End: 2020-12-06

## 2021-03-12 ENCOUNTER — OFFICE VISIT (OUTPATIENT)
Dept: VASCULAR SURGERY | Facility: CLINIC | Age: 56
End: 2021-03-12

## 2021-03-12 DIAGNOSIS — I78.1 SPIDER TELANGIECTASIS OF SKIN: Primary | ICD-10-CM

## 2021-03-12 PROCEDURE — S9999 SALES TAX: HCPCS | Performed by: SURGERY

## 2021-03-12 PROCEDURE — 36468 NJX SCLRSNT SPIDER VEINS: CPT | Performed by: SURGERY

## 2021-03-12 RX ORDER — PHENTERMINE HYDROCHLORIDE 15 MG/1
CAPSULE ORAL
COMMUNITY
Start: 2021-02-12 | End: 2021-10-15

## 2021-03-12 NOTE — PROGRESS NOTES
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  The patient voiced understanding and wished to proceed.  Informed consent was obtained.    We last saw the patient for sclerotherapy in November 2019.  She has since had recurrence bilaterally.  She is not complaining of significant pain from the veins and has not had hemorrhage or phlebitis.  She wished to have these treated for cosmetic purposes.    I donned the size headlight and injected telangiectasias from the ankles to the upper thighs, anteriorly, medially and laterally using 12 mL of 0.5% polidocanol foam.  We did not treat her posterior legs and thighs as she was 15 minutes late for her appointment.    We had the patient perform ankle pumps on the table.  We cleaned her legs, had her don thigh-high compression and had her walk for 10 minutes prior to getting a car drive home.  We encouraged to remain active today and we will have her return in 6 weeks in follow-up.  Post procedure instructions were given.    She tolerated the procedure well without evidence of allergic reaction or other complications.    Sclerotherapy    Date/Time: 3/12/2021 1:23 PM  Performed by: Mychal Smith MD  Authorized by: Mychal Smith MD     Time out: Immediately prior to the procedure a time out was called    Type:  Cosmetic  Session:  Full  Procedure side:  Bilateral  Solution/Amount:  .5 POLIDOCANOL  Syringes::  4  Patient tolerance:  Patient tolerated the procedure well with no immediate complications  Wrap/Hose:  David Smith MD    Dictated using [employer]  voice recognition software which may result in transcription errors

## 2021-03-12 NOTE — LETTER
3/12/2021         RE: Jenae Miller  [address]  [place] [address]        Dear Colleague,    Thank you for referring your patient, Jenae Miller, to the [facility] [place]. Please see a copy of my visit note below.        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  The patient voiced understanding and wished to proceed.  Informed consent was obtained.    We last saw the patient for sclerotherapy in November 2019.  She has since had recurrence bilaterally.  She is not complaining of significant pain from the veins and has not had hemorrhage or phlebitis.  She wished to have these treated for cosmetic purposes.    I donned the size headlight and injected telangiectasias from the ankles to the upper thighs, anteriorly, medially and laterally using 12 mL of 0.5% polidocanol foam.  We did not treat her posterior legs and thighs as she was 15 minutes late for her appointment.    We had the patient perform ankle pumps on the table.  We cleaned her legs, had her don thigh-high compression and had her walk for 10 minutes prior to getting a car drive home.  We encouraged to remain active today and we will have her return in 6 weeks in follow-up.  Post procedure instructions were given.    She tolerated the procedure well without evidence of allergic reaction or other complications.    Sclerotherapy    Date/Time: 3/12/2021 1:23 PM  Performed by: Mychal Smith MD  Authorized by: Mychal Smith MD     Time out: Immediately prior to the procedure a time out was called     Type:  Cosmetic  Session:  Full  Procedure side:  Bilateral  Solution/Amount:  .5 POLIDOCANOL  Syringes::  4  Patient tolerance:  Patient tolerated the procedure well with no immediate complications  Wrap/Hose:  David Smith MD    Dictated using Dragon voice recognition software which may result in transcription errors      Again, thank you for allowing me to participate in the care of your patient.        Sincerely,        Mychal Smith MD

## 2021-03-15 ENCOUNTER — MYC MEDICAL ADVICE (OUTPATIENT)
Dept: INTERNAL MEDICINE | Facility: CLINIC | Age: 56
End: 2021-03-15

## 2021-04-23 ENCOUNTER — OFFICE VISIT (OUTPATIENT)
Dept: VASCULAR SURGERY | Facility: CLINIC | Age: 56
End: 2021-04-23

## 2021-04-23 DIAGNOSIS — I78.1 SPIDER TELANGIECTASIS OF SKIN: Primary | ICD-10-CM

## 2021-04-23 PROCEDURE — S9999 SALES TAX: HCPCS | Performed by: SURGERY

## 2021-04-23 PROCEDURE — 36468 NJX SCLRSNT SPIDER VEINS: CPT | Performed by: SURGERY

## 2021-04-23 NOTE — LETTER
4/23/2021         RE: Jenae Miller  [address] [place] [address]        Dear Colleague,    Thank you for referring your patient, Jenae Miller, to the [facility]. Please see a copy of my visit note below.        Vein Clinic Sclerotherapy Note     Indications:  Residual, bilateral lower extremity spider telangiectasias of cosmetic concern; status post 1 session cosmetic sclerotherapy     Procedure:  Bilateral lower extremity cosmetic sclerotherapy     Procedure description  Details of procedure including risks of allergic reaction, deep vein thrombosis, ulceration, superficial thrombophlebitis and hyperpigmentation were discussed.  The patient voiced understanding and wished to proceed.  Informed consent was obtained.    I donned the Syris headlight and noted that the patient has residual clusters of red telangiectasias over the distal medial right thigh, distal lateral right thigh, distal lateral left thigh and a few scattered over her legs.  These were injected with 2.5% polidocanol foam in a 1 part polidocanol to 2 parts air mixture.  Used a total of 6 mL of foam, covering the above areas.    We cleaned the patient's legs, had her don compression hose and then had her walk for 10 minutes prior to getting a car drive home.  She will return on an as-needed basis.      Sclerotherapy    Date/Time: 4/23/2021 2:17 PM  Performed by: Mychal Smith MD  Authorized by: Mychal Smith MD     Time out: Immediately prior to the procedure a time out was called    Type:  Cosmetic  Session:  Limited  Procedure side:  Bilateral  Solution/Amount:  .5 POLIDOCANOL  Syringes::  2  Patient tolerance:  Patient tolerated the procedure well with no immediate complications  Wrap/Hose:  Jovanae        JENNIFER Smith MD    Dictated using Dragon voice recognition software which may result in transcription errors      Again, thank you for allowing me  to participate in the care of your patient.        Sincerely,        Mychal Smith MD

## 2021-04-23 NOTE — PROGRESS NOTES
Vein Clinic Sclerotherapy Note     Indications:  Residual, bilateral lower extremity spider telangiectasias of cosmetic concern; status post 1 session cosmetic sclerotherapy     Procedure:  Bilateral lower extremity cosmetic sclerotherapy     Procedure description  Details of procedure including risks of allergic reaction, deep vein thrombosis, ulceration, superficial thrombophlebitis and hyperpigmentation were discussed.  The patient voiced understanding and wished to proceed.  Informed consent was obtained.    I donned the Syris headlight and noted that the patient has residual clusters of red telangiectasias over the distal medial right thigh, distal lateral right thigh, distal lateral left thigh and a few scattered over her legs.  These were injected with 2.5% polidocanol foam in a 1 part polidocanol to 2 parts air mixture.  Used a total of 6 mL of foam, covering the above areas.    We cleaned the patient's legs, had her don compression hose and then had her walk for 10 minutes prior to getting a car drive home.  She will return on an as-needed basis.      Sclerotherapy    Date/Time: 4/23/2021 2:17 PM  Performed by: Mychal Smith MD  Authorized by: Mychal Smith MD     Time out: Immediately prior to the procedure a time out was called    Type:  Cosmetic  Session:  Limited  Procedure side:  Bilateral  Solution/Amount:  .5 POLIDOCANOL  Syringes::  2  Patient tolerance:  Patient tolerated the procedure well with no immediate complications  Wrap/Hose:  David Smith MD    Dictated using Dragon voice recognition software which may result in transcription errors

## 2021-09-25 ENCOUNTER — HEALTH MAINTENANCE LETTER (OUTPATIENT)
Age: 56
End: 2021-09-25

## 2021-10-05 ENCOUNTER — DOCUMENTATION ONLY (OUTPATIENT)
Dept: LAB | Facility: CLINIC | Age: 56
End: 2021-10-05

## 2021-10-05 DIAGNOSIS — Z11.59 ENCOUNTER FOR HEPATITIS C SCREENING TEST FOR LOW RISK PATIENT: Primary | ICD-10-CM

## 2021-10-05 DIAGNOSIS — M17.0 PRIMARY OSTEOARTHRITIS OF BOTH KNEES: ICD-10-CM

## 2021-10-05 DIAGNOSIS — Z13.6 CARDIOVASCULAR SCREENING; LDL GOAL LESS THAN 160: ICD-10-CM

## 2021-10-08 ENCOUNTER — LAB (OUTPATIENT)
Dept: LAB | Facility: CLINIC | Age: 56
End: 2021-10-08
Payer: COMMERCIAL

## 2021-10-08 DIAGNOSIS — M17.0 PRIMARY OSTEOARTHRITIS OF BOTH KNEES: ICD-10-CM

## 2021-10-08 DIAGNOSIS — Z13.6 CARDIOVASCULAR SCREENING; LDL GOAL LESS THAN 160: ICD-10-CM

## 2021-10-08 DIAGNOSIS — Z11.59 ENCOUNTER FOR HEPATITIS C SCREENING TEST FOR LOW RISK PATIENT: ICD-10-CM

## 2021-10-08 LAB
ANION GAP SERPL CALCULATED.3IONS-SCNC: 4 MMOL/L (ref 3–14)
BUN SERPL-MCNC: 14 MG/DL (ref 7–30)
CALCIUM SERPL-MCNC: 9.3 MG/DL (ref 8.5–10.1)
CHLORIDE BLD-SCNC: 106 MMOL/L (ref 94–109)
CHOLEST SERPL-MCNC: 205 MG/DL
CO2 SERPL-SCNC: 30 MMOL/L (ref 20–32)
CREAT SERPL-MCNC: 0.92 MG/DL (ref 0.52–1.04)
FASTING STATUS PATIENT QL REPORTED: YES
GFR SERPL CREATININE-BSD FRML MDRD: 71 ML/MIN/1.73M2
GLUCOSE BLD-MCNC: 88 MG/DL (ref 70–99)
HCV AB SERPL QL IA: NONREACTIVE
HDLC SERPL-MCNC: 108 MG/DL
LDLC SERPL CALC-MCNC: 79 MG/DL
NONHDLC SERPL-MCNC: 97 MG/DL
POTASSIUM BLD-SCNC: 4.1 MMOL/L (ref 3.4–5.3)
SODIUM SERPL-SCNC: 140 MMOL/L (ref 133–144)
TRIGL SERPL-MCNC: 91 MG/DL

## 2021-10-08 PROCEDURE — 86803 HEPATITIS C AB TEST: CPT

## 2021-10-08 PROCEDURE — 80048 BASIC METABOLIC PNL TOTAL CA: CPT

## 2021-10-08 PROCEDURE — 80061 LIPID PANEL: CPT

## 2021-10-08 PROCEDURE — 36415 COLL VENOUS BLD VENIPUNCTURE: CPT

## 2021-10-15 ENCOUNTER — DOCUMENTATION ONLY (OUTPATIENT)
Dept: ONCOLOGY | Facility: CLINIC | Age: 56
End: 2021-10-15

## 2021-10-15 ENCOUNTER — OFFICE VISIT (OUTPATIENT)
Dept: INTERNAL MEDICINE | Facility: CLINIC | Age: 56
End: 2021-10-15
Payer: COMMERCIAL

## 2021-10-15 VITALS
BODY MASS INDEX: 39.71 KG/M2 | HEIGHT: 67 IN | OXYGEN SATURATION: 97 % | TEMPERATURE: 97.9 F | HEART RATE: 69 BPM | DIASTOLIC BLOOD PRESSURE: 70 MMHG | WEIGHT: 253 LBS | SYSTOLIC BLOOD PRESSURE: 130 MMHG

## 2021-10-15 DIAGNOSIS — Z98.84 BARIATRIC SURGERY STATUS: ICD-10-CM

## 2021-10-15 DIAGNOSIS — J45.20 MILD INTERMITTENT ASTHMA WITHOUT COMPLICATION: ICD-10-CM

## 2021-10-15 DIAGNOSIS — E66.01 SEVERE OBESITY WITH BODY MASS INDEX (BMI) OF 35.0 TO 39.9 WITH SERIOUS COMORBIDITY (H): ICD-10-CM

## 2021-10-15 DIAGNOSIS — Z85.3 HX: BREAST CANCER: ICD-10-CM

## 2021-10-15 DIAGNOSIS — Z90.13 STATUS POST MASTECTOMY, BILATERAL: ICD-10-CM

## 2021-10-15 DIAGNOSIS — Z80.3 FAMILY HISTORY OF BREAST CANCER IN MOTHER: ICD-10-CM

## 2021-10-15 DIAGNOSIS — M17.0 PRIMARY OSTEOARTHRITIS OF BOTH KNEES: ICD-10-CM

## 2021-10-15 DIAGNOSIS — Z00.01 ENCOUNTER FOR GENERAL ADULT MEDICAL EXAMINATION WITH ABNORMAL FINDINGS: Primary | ICD-10-CM

## 2021-10-15 PROCEDURE — 99396 PREV VISIT EST AGE 40-64: CPT | Performed by: INTERNAL MEDICINE

## 2021-10-15 ASSESSMENT — MIFFLIN-ST. JEOR: SCORE: 1780.23

## 2021-10-15 ASSESSMENT — ENCOUNTER SYMPTOMS
EYE PAIN: 0
JOINT SWELLING: 0
PARESTHESIAS: 0
WEAKNESS: 0
HEMATOCHEZIA: 0
DIARRHEA: 0
FREQUENCY: 0
HEMATURIA: 0
MYALGIAS: 1
CHILLS: 0
NERVOUS/ANXIOUS: 1
BREAST MASS: 0
SHORTNESS OF BREATH: 0
ABDOMINAL PAIN: 0
DIZZINESS: 0
COUGH: 0
CONSTIPATION: 0
HEADACHES: 0
PALPITATIONS: 0
FEVER: 0
HEARTBURN: 0
ARTHRALGIAS: 1
NAUSEA: 0
SORE THROAT: 0

## 2021-10-15 NOTE — PROGRESS NOTES
"   SUBJECTIVE:   CC: Jenae Miller is an 56 year old woman who presents for preventive health visit.     55 y/o F here for AFE, and having some anxiety lately.   H/o cervical dysplasia (PAP via OBGYN, [facility]),  RITU (resolved), Br Ca (bilateral mastectomy 2010), MO (BMI 38), djd knees, TMJ (mouth guard), intermittent asthma, Sleeve gastrectomy (2015, weight loss ), IUD in place.   weight loss after Sleeve surgery was 100#, but she has gained back 50#.  Due to anxiety she is exhibiting recurrence compulsive behaviours (snacking, shopping) which are creating a cycle of increased anxiety.      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  She dreads the busride and being around people    She feels she is gaining weight due to coping with stress.   Stopped phentermine, might not have been working and \"insurance made it a hassle\"    Patient has been advised of split billing requirements and indicates understanding: Yes  Healthy Habits:     Getting at least 3 servings of Calcium per day:  Yes    Bi-annual eye exam:  Yes    Dental care twice a year:  Yes    Sleep apnea or symptoms of sleep apnea:  None    Diet:  Regular (no restrictions)    Frequency of exercise:  6-7 days/week    Duration of exercise:  45-60 minutes    Taking medications regularly:  Yes    Medication side effects:  Not applicable    PHQ-2 Total Score: 1    Additional concerns today:  No          No Concerns     Today's PHQ-2 Score:   PHQ-2 ( 1999 Pf[employer]) 10/15/2021   Q1: Little interest or pleasure in doing things 0   Q2: Feeling down, depressed or hopeless 1   PHQ-2 Score 1   Q1: Little interest or pleasure in doing things Not at all   Q2: Feeling down, depressed or hopeless Several days   PHQ-2 Score 1       Abuse: Current or Past (Physical, Sexual or Emotional) - No  Do you feel safe in your environment? Yes    Have you ever done Advance Care Planning? (For example, a Health Directive, POLST, or a discussion with a medical " provider or your loved ones about your wishes): No, advance care planning information given to patient to review.  Patient plans to discuss their wishes with loved ones or provider.      Social History     Tobacco Use     Smoking status: Never Smoker     Smokeless tobacco: Never Used   Substance Use Topics     Alcohol use: Yes     Comment: 2-3 drinks a week      If you drink alcohol do you typically have >3 drinks per day or >7 drinks per week? No    Alcohol Use 10/15/2021   Prescreen: >3 drinks/day or >7 drinks/week? No   Prescreen: >3 drinks/day or >7 drinks/week? -       Reviewed orders with patient.  Reviewed health maintenance and updated orders accordingly - Yes  Lab work is in process  Labs reviewed in EPIC  BP Readings from Last 3 Encounters:   10/15/21 130/70   08/10/20 (!) 141/85   09/13/19 130/78    Wt Readings from Last 3 Encounters:   10/15/21 114.8 kg (253 lb)   08/10/20 110.2 kg (243 lb)   09/13/19 108.2 kg (238 lb 8 oz)                  Patient Active Problem List   Diagnosis     CARDIOVASCULAR SCREENING; LDL GOAL LESS THAN 160     Family history of breast cancer in mother     Varicose veins of legs     Colon polyp     Thrush, oral     Intermittent asthma     Congenital anomaly of fixation of intestine     Bariatric surgery status     HX: breast cancer     Serrated adenoma of colon     overweight HCC     Status post mastectomy, bilateral     Primary osteoarthritis of both knees     Past Surgical History:   Procedure Laterality Date     ABDOMEN SURGERY  Dec. 2014    Fat grafting for breast reconstruction     ABDOMEN SURGERY  6/17/15    Vertical sleeve gastrectomy     BREAST BIOPSY, RT/LT  2001    lt 2001, rt  2010     COLONOSCOPY  12.13.12    Repeat Colonoscopy in 5 yrs.     COSMETIC SURGERY  2003 and 2014     CRYOCAUTERY OF CERVIX      [place]     CYSTOSCOPY,DIL URETHRAL STRICTURE      age 7     HC DILATION/CURETTAGE DIAG/THER NON OB  2006     HC TOOTH EXTRACTION W/FORCEP  1986     MASTECTOMY, BILATERAL   5/26/10    rt sided breast ca  with reconstruction     TONSILLECTOMY & ADENOIDECTOMY       [facility] COLONOSCOPY THRU STOMA W BIOPSY/CAUTERY TUMOR/POLYP/LESION      Dr. Jackson 96, '01 Repeat      [facility] COLONOSCOPY THRU STOMA, DIAGNOSTIC              Social History     Tobacco Use     Smoking status: Never Smoker     Smokeless tobacco: Never Used   Substance Use Topics     Alcohol use: Yes     Comment: 2-3 drinks a week      Family History   Problem Relation Age of Onset     Obesity Mother      Breast Cancer Mother 57         at 60 of breast cancer     Colon Polyps Father         large polyps     Diabetes Father      Hypertension Father      Obesity Father      Unknown/Adopted Maternal Grandmother      Unknown/Adopted Maternal Grandfather      Cancer Paternal Grandmother         stomach     Obesity Sister      Obesity Sister      Breast Cancer Other          Current Outpatient Medications   Medication Sig Dispense Refill     CALCIUM CITRATE PO        CENTRUM OR TABS 1 TABLET DAILY       cholecalciferol (VITAMIN D) 1000 UNIT tablet Take 1 tablet by mouth 2 times daily.       Cyanocobalamin (VITAMIN B 12 PO)        CycloSPORINE (RESTASIS OP) Apply  to eye 2 times daily.       albuterol (PROAIR HFA/PROVENTIL HFA/VENTOLIN HFA) 108 (90 Base) MCG/ACT inhaler Inhale 2 puffs into the lungs every 6 hours as needed for shortness of breath / dyspnea or wheezing (Patient not taking: Reported on 10/15/2021) 3 Inhaler 1     Allergies   Allergen Reactions     Sulfa Drugs Anaphylaxis     Eyes red and swollen     Topiramate Hives     Adhesive Tape Rash     surgical tape  Other reaction(s): Hives     Hydrocodone-Acetaminophen Hives     Other reaction(s): Flushing, Hives  Liquid  Lortab only, reports pill with no issues       Penicillins Hives     Taxotere Hives     Recent Labs   Lab Test 10/08/21  0921 11/23/15  1854 08/28/15  0000 02/27/15  0000 14  0000   LDL 79  --   --   --   --      --   --   --    --    TRIG 91  --   --   --   --    ALT  --   --  14 15 17   CR 0.92 0.89  --   --   --    GFRESTIMATED 71 68  --   --   --    GFRESTBLACK  --  82  --   --   --    POTASSIUM 4.1 3.9  --   --   --    TSH  --   --   --  4.84 2.43        Breast Cancer Screening:    FHS-7:   Breast CA Risk Assessment (FHS-7) 10/15/2021   Did any of your first-degree relatives have breast or ovarian cancer? Yes   Did any of your relatives have bilateral breast cancer? No   Did any man in your family have breast cancer? No   Did any woman in your family have breast and ovarian cancer? No   Did any woman in your family have breast cancer before age 50 y? No   Do you have 2 or more relatives with breast and/or ovarian cancer? Yes   Do you have 2 or more relatives with breast and/or bowel cancer? Yes     click delete button to remove this line now  Mammogram Screening - Alternate mammogram schedule due to breast cancer history  Pertinent mammograms are reviewed under the imaging tab.    History of abnormal Pap smear: normal PAP 6/2016, she will get done via OBGYN 6/2022.      Reviewed and updated as needed this visit by clinical staff                 Reviewed and updated as needed this visit by Provider                Past Medical History:   Diagnosis Date     Acne      Aortic root dilation (H) 6/16/2015     AR (allergic rhinitis)      Arthritis 2005    Knees     Asthma      Back pain      Bell's palsies 11/2008    Right     Breast cancer (H) 4/10    rt side. Dr. Chayo Chan.      Contraception      GERD (gastroesophageal reflux disease)      Hemorrhoids      Hyperlipidemia      Hypertension Feb. 2015     LGSIL (low grade squamous intraepithelial dysplasia) 2004     Malrotation colon      TMJ (dislocation of temporomandibular joint)       Past Surgical History:   Procedure Laterality Date     ABDOMEN SURGERY  Dec. 2014    Fat grafting for breast reconstruction     ABDOMEN SURGERY  6/17/15    Vertical sleeve gastrectomy     BREAST BIOPSY,  RT/LT  2001    lt 2001, rt  2010     COLONOSCOPY  12.13.12    Repeat Colonoscopy in 5 yrs.     COSMETIC SURGERY  2003 and 2014     CRYOCAUTERY OF CERVIX      [place]     CYSTOSCOPY,DIL URETHRAL STRICTURE      age 7     HC DILATION/CURETTAGE DIAG/THER NON OB  2006     HC TOOTH EXTRACTION W/FORCEP  1986     MASTECTOMY, BILATERAL  5/26/10    rt sided breast ca  with reconstruction     TONSILLECTOMY & ADENOIDECTOMY  2005     [facility] COLONOSCOPY THRU STOMA W BIOPSY/CAUTERY TUMOR/POLYP/LESION  1996    Dr. Jackson 8/30/96, '01 Repeat 2008     [facility] COLONOSCOPY THRU STOMA, DIAGNOSTIC  5/07            Review of Systems   Constitutional: Negative for chills and fever.   HENT: Negative for congestion, ear pain, hearing loss and sore throat.    Eyes: Negative for pain and visual disturbance.   Respiratory: Negative for cough and shortness of breath.    Cardiovascular: Negative for chest pain, palpitations and peripheral edema.   Gastrointestinal: Negative for abdominal pain, constipation, diarrhea, heartburn, hematochezia and nausea.   Breasts:  Negative for tenderness, breast mass and discharge.   Genitourinary: Negative for frequency, genital sores, hematuria, pelvic pain, urgency, vaginal bleeding and vaginal discharge.   Musculoskeletal: Positive for arthralgias and myalgias. Negative for joint swelling.   Skin: Negative for rash.   Neurological: Negative for dizziness, weakness, headaches and paresthesias.   Psychiatric/Behavioral: Negative for mood changes. The patient is nervous/anxious.      CONSTITUTIONAL: NEGATIVE for fever, chills, change in weight  INTEGUMENTARY/SKIN: NEGATIVE for worrisome rashes, moles or lesions  EYES: NEGATIVE for vision changes or irritation  ENT: NEGATIVE for ear, mouth and throat problems  RESP: NEGATIVE for significant cough or SOB  BREAST: NEGATIVE for masses, tenderness or discharge    CV: NEGATIVE for chest pain, palpitations or peripheral edema  GI: NEGATIVE for nausea, abdominal pain,  "heartburn, or change in bowel habits  : NEGATIVE for unusual urinary or vaginal symptoms. No vaginal bleeding.  MUSCULOSKELETAL: NEGATIVE for significant arthralgias or myalgia  NEURO: NEGATIVE for weakness, dizziness or paresthesias  PSYCHIATRIC: NEGATIVE for changes in mood or affect      OBJECTIVE:   /70 (BP Location: Left arm, Patient Position: Sitting, Cuff Size: Adult Large)   Pulse 69   Temp 97.9  F (36.6  C) (Oral)   Ht 1.702 m (5' 7\")   Wt 114.8 kg (253 lb)   SpO2 97%   BMI 39.63 kg/m       Physical Exam     GENERAL: healthy, alert and no distress  EYES: Eyes grossly normal to inspection, PERRL and conjunctivae and sclerae normal  HENT: ear canals and TM's normal, nose and mouth without ulcers or lesions  NECK: no adenopathy, no asymmetry, masses, or scars and thyroid normal to palpation  RESP: lungs clear to auscultation - no rales, rhonchi or wheezes  BREAST: no palpable axillary masses or adenopathy  CV: regular rate and rhythm, normal S1 S2, no S3 or S4, no murmur, click or rub, no peripheral edema and peripheral pulses strong  ABDOMEN: soft, nontender, no hepatosplenomegaly, no masses and bowel sounds normal   (female): normal female external genitalia, normal urethral meatus, vaginal mucosa pink, moist, well rugated, and normal cervix/adnexa/uterus without masses or discharge   (female): normal female external genitalia, normal urethral meatus  and vaginal mucosa pink, moist, well rugated  MS: no gross musculoskeletal defects noted, no edema  SKIN: no suspicious lesions or rashes  NEURO: Normal strength and tone, mentation intact and speech normal  PSYCH: mentation appears normal, affect normal/bright  LYMPH: no cervical, supraclavicular, axillary, or inguinal adenopathy    Diagnostic Test Results:  Labs reviewed in Epic  No results found for this or any previous visit (from the past 24 hour(s)).  No results found for any visits on 10/15/21.    ASSESSMENT/PLAN:   (Z00.01) Encounter " "for general adult medical examination with abnormal findings  (primary encounter diagnosis)        (E66.01) overweight HCC  Comment: plateau.  Gained since Sleeve surgery 2014  Plan: continued efforts,  She is already restricting to 1500 duncan per day    (J45.20) Mild intermittent asthma without complication  Comment: no current issues  Plan: continue current management     (Z98.84) Bariatric surgery status  Comment:   Plan:      (Z90.13) Status post mastectomy, bilateral  Comment:    Plan: REVIEW OF HEALTH MAINTENANCE PROTOCOL ORDERS,         Cancer Risk Mgmt/Cancer Genetic Counseling         Referral             (Z85.3) HX: breast cancer  Comment: *   Plan:      (Z80.3) Family history of breast cancer in mother  Comment: *interested in re-exploring genetic screening in case there are new things.   H/o genetic screening >10Y ago, BRCA(-)  Plan: REVIEW OF HEALTH MAINTENANCE PROTOCOL ORDERS,         Cancer Risk Mgmt/Cancer Genetic Counseling         Referral            (M17.0) Primary osteoarthritis of both knees  Comment: no current issues  Plan: watchful waiting     Patient has been advised of split billing requirements and indicates understanding: Yes  COUNSELING:  Reviewed preventive health counseling, as reflected in patient instructions       Regular exercise    Estimated body mass index is 38.06 kg/m  as calculated from the following:    Height as of 8/10/20: 1.702 m (5' 7\").    Weight as of 8/10/20: 110.2 kg (243 lb).    Weight management plan: see above    She reports that she has never smoked. She has never used smokeless tobacco.      Counseling Resources:  ATP IV Guidelines  Pooled Cohorts Equation Calculator  Breast Cancer Risk Calculator  BRCA-Related Cancer Risk Assessment: FHS-7 Tool  FRAX Risk Assessment  ICSI Preventive Guidelines  Dietary Guidelines for Americans, 2010  USDA's MyPlate  ASA Prophylaxis  Lung CA Screening    Tonya Mtz MD  [facility]  "

## 2021-10-15 NOTE — PATIENT INSTRUCTIONS
Patient Education   Personalized Prevention Plan  You are due for the preventive services outlined below.  Your care team is available to assist you in scheduling these services.  If you have already completed any of these items, please share that information with your care team to update in your medical record.  Health Maintenance Due   Topic Date Due     ANNUAL REVIEW OF HM ORDERS  Never done     Discuss Advance Care Planning  Never done     Asthma Control Test  02/10/2021     Preventive Care Visit  08/10/2021     Asthma Action Plan - yearly  08/10/2021     Flu Vaccine (1) Never done         We have a sent a notice to a staff member of the Cancer Risk Management Program to give you a call to assist with scheduling your appointment.  You may also call   [phone] ([phone] to initiate scheduling.       Pap due 6/2022.     Limit sodium to 1800 mg per day    BP goal is less than 140/90.

## 2021-10-15 NOTE — LETTER
My Asthma Action Plan    Name: Jenae Miller   YOB: 1965  Date: 10/15/2021   My doctor: Tonya Mtz MD   My clinic: [facility]        My Rescue Medicine:   Albuterol inhaler (Proair/Ventolin/Proventil HFA)  2-4 puffs EVERY 4 HOURS as needed. Use a spacer if recommended by your provider.   My Asthma Severity:   Intermittent / Exercise Induced  Know your asthma triggers: upper respiratory infections             GREEN ZONE   Good Control    I feel good    No cough or wheeze    Can work, sleep and play without asthma symptoms       Take your asthma control medicine every day.     1. If exercise triggers your asthma, take your rescue medication    15 minutes before exercise or sports, and    During exercise if you have asthma symptoms  2. Spacer to use with inhaler: If you have a spacer, make sure to use it with your inhaler             YELLOW ZONE Getting Worse  I have ANY of these:    I do not feel good    Cough or wheeze    Chest feels tight    Wake up at night   1. Keep taking your Green Zone medications  2. Start taking your rescue medicine:    every 20 minutes for up to 1 hour. Then every 4 hours for 24-48 hours.  3. If you stay in the Yellow Zone for more than 12-24 hours, contact your doctor.  4. If you do not return to the Green Zone in 12-24 hours or you get worse, start taking your oral steroid medicine if prescribed by your provider.           RED ZONE Medical Alert - Get Help  I have ANY of these:    I feel awful    Medicine is not helping    Breathing getting harder    Trouble walking or talking    Nose opens wide to breathe       1. Take your rescue medicine NOW  2. If your provider has prescribed an oral steroid medicine, start taking it NOW  3. Call your doctor NOW  4. If you are still in the Red Zone after 20 minutes and you have not reached your doctor:    Take your rescue medicine again and    Call 911 or go to the emergency room right away    See  your regular doctor within 2 weeks of an Emergency Room or Urgent Care visit for follow-up treatment.          Annual Reminders:  Meet with Asthma Educator,  Flu Shot in the Fall, consider Pneumonia Vaccination for patients with asthma (aged 19 and older).    Pharmacy: [facility] [id number] [place] [facility] [place] [address]    Electronically signed by Tonya Mtz MD   Date: 10/15/21                    Asthma Triggers  How To Control Things That Make Your Asthma Worse    Triggers are things that make your asthma worse.  Look at the list below to help you find your triggers and   what you can do about them. You can help prevent asthma flare-ups by staying away from your triggers.      Trigger                                                          What you can do   Cigarette Smoke  Tobacco smoke can make asthma worse. Do not allow smoking in your home, car or around you.  Be sure no one smokes at a child s day care or school.  If you smoke, ask your health care provider for ways to help you quit.  Ask family members to quit too.  Ask your health care provider for a referral to Quit Plan to help you quit smoking, or call 9-540-173-PLAN.     Colds, Flu, Bronchitis  These are common triggers of asthma. Wash your hands often.  Don t touch your eyes, nose or mouth.  Get a flu shot every year.     Dust Mites  These are tiny bugs that live in cloth or carpet. They are too small to see. Wash sheets and blankets in hot water every week.   Encase pillows and mattress in dust mite proof covers.  Avoid having carpet if you can. If you have carpet, vacuum weekly.   Use a dust mask and HEPA vacuum.   Pollen and Outdoor Mold  Some people are allergic to trees, grass, or weed pollen, or molds. Try to keep your windows closed.  Limit time out doors when pollen count is high.   Ask you health care provider about taking medicine during allergy season.     Animal Dander  Some people are allergic to skin flakes, urine or saliva  from pets with fur or feathers. Keep pets with fur or feathers out of your home.    If you can t keep the pet outdoors, then keep the pet out of your bedroom.  Keep the bedroom door closed.  Keep pets off cloth furniture and away from stuffed toys.     Mice, Rats, and Cockroaches  Some people are allergic to the waste from these pests.   Cover food and garbage.  Clean up spills and food crumbs.  Store grease in the refrigerator.   Keep food out of the bedroom.   Indoor Mold  This can be a trigger if your home has high moisture. Fix leaking faucets, pipes, or other sources of water.   Clean moldy surfaces.  Dehumidify basement if it is damp and smelly.   Smoke, Strong Odors, and Sprays  These can reduce air quality. Stay away from strong odors and sprays, such as perfume, powder, hair spray, paints, smoke incense, paint, cleaning products, candles and new carpet.   Exercise or Sports  Some people with asthma have this trigger. Be active!  Ask your doctor about taking medicine before sports or exercise to prevent symptoms.    Warm up for 5-10 minutes before and after sports or exercise.     Other Triggers of Asthma  Cold air:  Cover your nose and mouth with a scarf.  Sometimes laughing or crying can be a trigger.  Some medicines and food can trigger asthma.

## 2021-10-16 ASSESSMENT — ASTHMA QUESTIONNAIRES: ACT_TOTALSCORE: 25

## 2021-12-02 ENCOUNTER — ANCILLARY PROCEDURE (OUTPATIENT)
Dept: ULTRASOUND IMAGING | Facility: CLINIC | Age: 56
End: 2021-12-02
Attending: INTERNAL MEDICINE
Payer: COMMERCIAL

## 2021-12-02 ENCOUNTER — NURSE TRIAGE (OUTPATIENT)
Dept: NURSING | Facility: CLINIC | Age: 56
End: 2021-12-02
Payer: COMMERCIAL

## 2021-12-02 DIAGNOSIS — M79.661 RIGHT CALF PAIN: ICD-10-CM

## 2021-12-02 DIAGNOSIS — M79.661 RIGHT CALF PAIN: Primary | ICD-10-CM

## 2021-12-02 PROCEDURE — 93971 EXTREMITY STUDY: CPT | Mod: RT | Performed by: RADIOLOGY

## 2021-12-02 NOTE — TELEPHONE ENCOUNTER
Nurse Triage SBAR    Is this a 2nd Level Triage? YES, LICENSED PRACTITIONER REVIEW IS REQUIRED    Situation  : Patient is having consistent pain in her right lower shin since Sunday with redness. Painful to stand or walk.    Background  : Patient does remember bumping her leg on a box over the weekend, but there is no bruising in the area. Patient also had knee injections in both knees on 11/19/21 for osteoarthritis.    Assessment : Patient describes constant pain that worsens with flexion, standing, or walking. Also noted swelling in right ankle as well  (not pitting, but can see line where her sock was).     Patient states it is mildly warm, but not significantly different from other side. She does not have a fever. No calf or thigh pain.     (See information below for more triage details.)     Routing to provider to advise.    Protocol Recommended Disposition: Emergency department or [facility] Now (Or to Office with PCP approval).    Reason for Disposition    Thigh, calf, or ankle swelling in only one leg    Additional Information    Negative: Chest pain    Negative: Difficulty breathing    Negative: Entire foot is cool or blue in comparison to other side    Negative: Unable to walk    Negative: Fever and red area (or area very tender to touch)    Negative: Fever and swollen joint    Negative: Thigh or calf pain in only one leg and present > 1 hour    Negative: Looks like a broken bone or dislocated joint (e.g., crooked or deformed)    Negative: Sounds like a life-threatening emergency to the triager    Negative: Followed a hip injury    Negative: Followed a knee injury    Negative: Followed an ankle or foot injury    Negative: Back pain radiating (shooting) into leg(s)    Negative: Foot pain is the main symptom    Negative: Ankle pain is the main symptom    Negative: Knee pain is the main symptom    Negative: Leg swelling is the main symptom    Protocols used: LEG PAIN-A-OH

## 2021-12-02 NOTE — TELEPHONE ENCOUNTER
Called imaging and appt scheduled at [facility] with 1725 arrival for 1740 appt. Pt notified. Video visit scheduled with Dr. Mtz for tomorrow.    Baylee Maradiaga RN  [facility]

## 2021-12-02 NOTE — TELEPHONE ENCOUNTER
RN/Writer spoke with Carol at the [facility] who will route message to provider again and will have clinic follow-up with patient.    Alessia Dowd RN  12/02/21 3:30 PM  [facility] Nurse Advisor

## 2021-12-02 NOTE — TELEPHONE ENCOUNTER
"Please arrange for leg ultrasound R leg tonight or tomorrow -- \"ASAP\"  Call results to on call MD if tonight.     Call results to me if US  tomorrow    Put her on my schedule at 4:30 pm tomorrow for vv (in case the doppler is negative, will need further planning).  Thank ou  "

## 2021-12-03 ENCOUNTER — VIRTUAL VISIT (OUTPATIENT)
Dept: INTERNAL MEDICINE | Facility: CLINIC | Age: 56
End: 2021-12-03
Payer: COMMERCIAL

## 2021-12-03 DIAGNOSIS — F41.9 ANXIETY: ICD-10-CM

## 2021-12-03 DIAGNOSIS — M79.661 PAIN OF RIGHT LOWER LEG: Primary | ICD-10-CM

## 2021-12-03 DIAGNOSIS — L03.119 CELLULITIS OF ANTERIOR LOWER LEG: ICD-10-CM

## 2021-12-03 PROCEDURE — 99213 OFFICE O/P EST LOW 20 MIN: CPT | Mod: 95 | Performed by: INTERNAL MEDICINE

## 2021-12-03 RX ORDER — CEPHALEXIN 500 MG/1
1000 CAPSULE ORAL 2 TIMES DAILY
Qty: 28 CAPSULE | Refills: 1 | Status: SHIPPED | OUTPATIENT
Start: 2021-12-03 | End: 2021-12-10

## 2021-12-03 NOTE — PROGRESS NOTES
Alta is a 56 year old who is being evaluated via a billable video visit.      How would you like to obtain your AVS? MyChart  If the video visit is dropped, the invitation should be resent by: Text to cell phone: [phone]  Will anyone else be joining your video visit? No     Telephone.  Start Time: 5:19 PM    Assessment & Plan     Pain of right lower leg  - cephALEXin (KEFLEX) 500 MG capsule; Take 2 capsules (1,000 mg) by mouth 2 times daily for 7 days  Ice elevation rest  Cellulitis of anterior lower leg  US negative.  Will treat with abx.    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  She dreads the busride and being around people due to pandemic.  Her asthma and obesity make her high risk if she contracts the virus.   She will need her MD to fill out paperwork in support of working from home, due to being unable to work this out with employer.     No LOS data to display   Time spent doing chart review, history and exam, documentation and further activities per the note            No follow-ups on file.    Tonya Mtz MD  [facility]        Subjective   Alta is a 56 year old who presents for the following health issues     HPI   55 y/o F here for video visit.  H/o  cervical dysplasia (PAP via OBGYN, [facility]),  RITU (resolved), Br Ca (bilateral mastectomy 2010), MO (BMI 38), djd knees, TMJ (mouth guard), intermittent asthma, Sleeve gastrectomy (2015, weight loss ), IUD in place.   weight loss after Sleeve surgery was 100#, but she has gained back 50#.  Due to anxiety she is exhibiting recurrence compulsive behaviours (snacking, shopping) which are creating a cycle of increased anxiety.    She called yesterday with right lower leg pain.  may have had minimal trauma when she bumped into furniture last week.     Right leg pain, Had US  yesterday    Has a form she needs to have filled out so she can continue to work from home.   Will be a disability form...           Review of Systems   Constitutional, HEENT, cardiovascular, pulmonary, gi and gu systems are negative, except as otherwise noted.      Objective           Vitals:  No vitals were obtained today due to virtual visit.    Physical Exam   GENERAL: Healthy, alert and no distress  EYES: Eyes grossly normal to inspection.  No discharge or erythema, or obvious scleral/conjunctival abnormalities.  RESP: No audible wheeze, cough, or visible cyanosis.  No visible retractions or increased work of breathing.    PSYCH: Mentation appears normal, affect normal/bright, judgement and insight intact, normal speech and appearance well-groomed.    No video, just phone     Recent US RLE neg for clot.  Pain persists   Redness.             Video-Visit Details    Type of service: tel visit.  doximity did not work today nor did the amwll.      telephone End Time:5:34 PM    Originating Location (pt. Location): Home    Distant Location (provider location):  [facility]     Platform used for Video Visit: Other: had to change to telephone visit.

## 2021-12-15 NOTE — PROGRESS NOTES
12/16/2021    Jenae is a 56 year old who is being evaluated via a billable video visit.      Video-Visit Details    Type of service: Video Visit    Video Start Time: 11:14 am  Video End Time: 12:07 pm    Originating Location (pt. Location): Home    Distant Location (provider location): [facility]    Platform used for Video Visit: Pierre    Referring Provider: Tonya Mtz MD    Presenting Information:   I spoke with Jenae Miller over video today for genetic counseling to discuss her personal and family history of cancer. With her permission, this appointment was conducted virtually due to COVID-19 precautions. We talked today to review this history, cancer screening recommendations, and available genetic testing options.    Personal History:  Jenae is a 56 year old female. She was diagnosed with a right breast cancer in May 2010 at age 44 (IDC, ER positive, MS positive, HER2 negative). She underwent bilateral mastectomy on 5/26/10 and had chemotherapy. She had genetic testing via comprehensive BRCA analysis in 2010 which was negative. In 2013 she had BRCA analysis rearrangement testing (MADELYN), which was also negative. This information was obtained from an outside genetic counseling results letter from 2013, test reports not available for review today.    She had her first menstrual period at age 17 and is postmenopausal (this year). She does not have any children. Jenae has her ovaries, fallopian tubes and uterus in place. She reports that she has not used hormone replacement therapy. She has used oral contraceptives. Her most recent colonoscopy on 9/25/20 detected one polyp (pathology: normal colonic mucosa; a lymphoid aggregate is present) and follow-up was recommended in 5 years. Previous colonoscopies available for review include: 12/27/17 (one sessile serrated adenoma), 12/13/12 (no polyps), 5/22/07 (no polyps). She reports that she had her first colonoscopy in her 20s  and had one polyp. She sees dermatology annually and reports that she has had a couple dysplastic moles removed. Jenae reported no history of tobacco use and alcohol use of 3 drinks per week.    Family History: (Please see scanned pedigree for detailed family history information)  Siblings:    Her sister is 49 years old with no known history of cancer. She has reportedly had negative BRCA1/2 testing. She elected to have a prophylactic bilateral mastectomy.     She has one son and one daughter (ages 26 and 23), both with primary immune deficiency.     Her other sister is 44 years old with no known history of cancer. She is going to be having a hysterectomy and one ovary removed. She previously had one ovary removed at age 18 due to a teratoma.  Maternal:    Her mother was diagnosed with breast cancer (lobular) at age 57. Treatment included bilateral mastectomy, chemotherapy, radiation. She later had metastases to her bone and had more chemotherapy. She passed away at age 60.    Her aunt is in her late 90s with no known history of cancer.     Her daughter (Jenae's cousin) is 60 years old and was diagnosed with breast cancer in her mid 50s.    Jenae also reports that some of her mother's paternal cousins had breast cancer and possibly ovarian cancer, although she has limited details about this.  Paternal:    Her father is 81 years old and has a history of one large colon polyp that required a surgical resection about 20 years ago. He has no known history of cancer.     Her aunt is 75 years old and was diagnosed with breast cancer at age 65 (IDC). Treatment included unilateral mastectomy and chemotherapy.    Her grandmother was diagnosed with stomach cancer at age 82. This was metastatic to bone and she passed away at age 83.    Her grandfather passed away at age 93 with no known history of cancer.     He had a brother who may have had colon cancer, and another brother who may have had prostate cancer.     Her  maternal ethnicity is [place], [place], Portuguese. Her paternal ethnicity is [place]. There is no known Ashkenazi Gnosticism ancestry on either side of her family.     Discussion:    Jenae's personal and family history of cancer is suggestive of a hereditary cancer syndrome.    We reviewed the features of sporadic, familial, and hereditary cancers. In looking at Jenae's family history, it is possible that a cancer susceptibility gene is present due to her personal and family history of breast cancer.    We discussed the natural history and genetics of hereditary cancer. Jenae has already had negative BRCA1 and BRCA2 testing in the past. We discussed that there are now additional genes known to be associated with an increased risk for breast cancer. A detailed handout regarding genes in which mutations are associated with an increased risk for breast cancer will be provided to Jenae via [employer] and can be found in the after visit summary. Topics included: inheritance pattern, cancer risks, cancer screening recommendations, and also risks, benefits and limitations of testing.    Based on her personal and family history, Jenae meets current National Comprehensive Cancer Network (NCCN) criteria for updated genetic testing of high-penetrance breast cancer susceptibility genes.     As many of these genes present with overlapping features in a family and accurate cancer risk cannot always be established based upon the pedigree analysis alone, it would be reasonable for Jenae to consider panel genetic testing to analyze multiple genes at once.    We reviewed genetic testing options for Jenae based on her personal and family history: a panel of genes associated with an increased risk for breast cancer, or larger panel options to include genes associated with increased risk for multiple different cancer types. Jenae expressed an interest in learning as much information as possible from the testing. We  discussed expanded panel options including the CancerNext panel and the CustomNext-Cancer panel. She opted for an expanded CustomNext-Cancer panel (85 genes associated with an increased risk for multiple different types of cancer).  We discussed that many genes on this panel are associated with specific hereditary cancer syndromes and have published management guidelines. Other genes have medical management guidelines available to screen for certain cancers. The remaining genes are associated with increased cancer risk and may allow us to make medical recommendations when mutations are identified. Some of the genes on this expanded panel may have limited information available about specific cancer risks and therefore, there may be limited screening guidelines available. She stated that she understood potential limitations of a larger gene panel.     Due to COVID-19 precautions consent was obtained over the phone/video today. Genetic testing via the CustomNext-Cancer panel (85 genes associated with an increased risk for multiple different types of cancer) will be sent to [facility]. Jenae opted to have a saliva sample collection kit shipped directly to her home from [facility]. She will ship the kit back to [facility] for analysis. Turnaround time from date when sample is received at the lab: approximately 3-4 weeks.    Medical Management: For Jenae, we reviewed that the information from genetic testing may determine:    additional cancer screening for which Jenae may qualify (i.e. more frequent colonoscopies, more frequent dermatologic exams, etc.),    options for risk reducing surgeries Jenae could consider (i.e. surgery to remove her ovaries and/or uterus, etc.),      and targeted chemotherapies if she were to develop certain cancers in the future (i.e. immunotherapy for individuals with Guan syndrome, PARP inhibitors, etc.).     These recommendations will be discussed in detail once  genetic testing is completed.     Plan:  1) Today Jenae elected to proceed with genetic testing via the CustomNext-Cancer panel (85 genes associated with an increased risk for multiple different types of cancer) offered by [facility].  2) This information should be available in 4-5 weeks.  3) Jenae will be scheduled for a virtual visit (phone or video) to discuss the results.    Time spent over video: 53 minutes    Tatianna Schmitt MS, [facility]  Licensed, Certified Genetic Counselor  Office: [phone]  Email: [email or web address]

## 2021-12-16 ENCOUNTER — VIRTUAL VISIT (OUTPATIENT)
Dept: ONCOLOGY | Facility: CLINIC | Age: 56
End: 2021-12-16
Attending: INTERNAL MEDICINE
Payer: COMMERCIAL

## 2021-12-16 DIAGNOSIS — Z85.3 PERSONAL HISTORY OF MALIGNANT NEOPLASM OF BREAST: Primary | ICD-10-CM

## 2021-12-16 DIAGNOSIS — Z80.3 FAMILY HISTORY OF MALIGNANT NEOPLASM OF BREAST: ICD-10-CM

## 2021-12-16 PROCEDURE — 96040 HC GENETIC COUNSELING, EACH 30 MINUTES: CPT | Mod: 95 | Performed by: GENETIC COUNSELOR, MS

## 2021-12-16 NOTE — LETTER
Cancer Risk Management  Program Locations    [facility]  [facility]  [facility]  [facility]  [facility]  Mailing Address  Cancer Risk Management Program  [facility]  [address]  [place] [address]    New patient appointments  [phone]  January 3, 2022    Jenaeyoni Miller  [address] [place] [place] [address]      Dear Jenae,    It was a pleasure speaking with you over video for genetic counseling on 12/16/2021. Here is a copy of the progress note from our discussion. If you have any additional questions, please feel free to call.    Referring Provider: Tonya Mtz MD    Presenting Information:   I spoke with Jenae Miller over video today for genetic counseling to discuss her personal and family history of cancer. With her permission, this appointment was conducted virtually due to COVID-19 precautions. We talked today to review this history, cancer screening recommendations, and available genetic testing options.    Personal History:  Jenae is a 56 year old female. She was diagnosed with a right breast cancer in May 2010 at age 44 (IDC, ER positive, OR positive, HER2 negative). She underwent bilateral mastectomy on 5/26/10 and had chemotherapy. She had genetic testing via comprehensive BRCA analysis in 2010 which was negative. In 2013 she had BRCA analysis rearrangement testing (MADELYN), which was also negative. This information was obtained from an outside genetic counseling results letter from 2013, test reports not available for review today.    She had her first menstrual period at age 17 and is postmenopausal (this year). She does not have any children. Jenae has her ovaries, fallopian tubes and uterus in place. She reports that she has not used hormone replacement therapy. She has used oral contraceptives. Her  most recent colonoscopy on 9/25/20 detected one polyp (pathology: normal colonic mucosa; a lymphoid aggregate is present) and follow-up was recommended in 5 years. Previous colonoscopies available for review include: 12/27/17 (one sessile serrated adenoma), 12/13/12 (no polyps), 5/22/07 (no polyps). She reports that she had her first colonoscopy in her 20s and had one polyp. She sees dermatology annually and reports that she has had a couple dysplastic moles removed. Jenae reported no history of tobacco use and alcohol use of 3 drinks per week.    Family History: (Please see scanned pedigree for detailed family history information)  Siblings:    Her sister is 49 years old with no known history of cancer. She has reportedly had negative BRCA1/2 testing. She elected to have a prophylactic bilateral mastectomy.     She has one son and one daughter (ages 26 and 23), both with primary immune deficiency.     Her other sister is 44 years old with no known history of cancer. She is going to be having a hysterectomy and one ovary removed. She previously had one ovary removed at age 20 due to a teratoma.  Maternal:    Her mother was diagnosed with breast cancer (lobular) at age 57. Treatment included bilateral mastectomy, chemotherapy, radiation. She later had metastases to her bone and had more chemotherapy. She passed away at age 60.    Her aunt is in her late 90s with no known history of cancer.     Her daughter (Jenae's cousin) is 60 years old and was diagnosed with breast cancer in her mid 50s.    Jenae also reports that some of her mother's paternal cousins had breast cancer and possibly ovarian cancer, although she has limited details about this.  Paternal:    Her father is 81 years old and has a history of one large colon polyp that required a surgical resection about 20 years ago. He has no known history of cancer.     Her aunt is 75 years old and was diagnosed with breast cancer at age 65 (IDC). Treatment  included unilateral mastectomy and chemotherapy.    Her grandmother was diagnosed with stomach cancer at age 82. This was metastatic to bone and she passed away at age 83.    Her grandfather passed away at age 93 with no known history of cancer.     He had a brother who may have had colon cancer, and another brother who may have had prostate cancer.     Her maternal ethnicity is [place]ian, [place], [place]ian. Her paternal ethnicity is [place]ian. There is no known Ashkenazi Yarsanism ancestry on either side of her family.     Discussion:    Jenae's personal and family history of cancer is suggestive of a hereditary cancer syndrome.    We reviewed the features of sporadic, familial, and hereditary cancers. In looking at Jenae's family history, it is possible that a cancer susceptibility gene is present due to her personal and family history of breast cancer.    We discussed the natural history and genetics of hereditary cancer. Jenae has already had negative BRCA1 and BRCA2 testing in the past. We discussed that there are now additional genes known to be associated with an increased risk for breast cancer. A detailed handout regarding genes in which mutations are associated with an increased risk for breast cancer will be provided to Jenae via [facility] and can be found in the after visit summary. Topics included: inheritance pattern, cancer risks, cancer screening recommendations, and also risks, benefits and limitations of testing.    Based on her personal and family history, Jenae meets current National Comprehensive Cancer Network (NCCN) criteria for updated genetic testing of high-penetrance breast cancer susceptibility genes.     As many of these genes present with overlapping features in a family and accurate cancer risk cannot always be established based upon the pedigree analysis alone, it would be reasonable for Jenae to consider panel genetic testing to analyze multiple genes at once.    We reviewed  genetic testing options for Jenae based on her personal and family history: a panel of genes associated with an increased risk for breast cancer, or larger panel options to include genes associated with increased risk for multiple different cancer types. Jenae expressed an interest in learning as much information as possible from the testing. We discussed expanded panel options including the CancerNext panel and the CustomNext-Cancer panel. She opted for an expanded CustomNext-Cancer panel (85 genes associated with an increased risk for multiple different types of cancer).  We discussed that many genes on this panel are associated with specific hereditary cancer syndromes and have published management guidelines. Other genes have medical management guidelines available to screen for certain cancers. The remaining genes are associated with increased cancer risk and may allow us to make medical recommendations when mutations are identified. Some of the genes on this expanded panel may have limited information available about specific cancer risks and therefore, there may be limited screening guidelines available. She stated that she understood potential limitations of a larger gene panel.     Due to COVID-19 precautions consent was obtained over the phone/video today. Genetic testing via the CustomNext-Cancer panel (85 genes associated with an increased risk for multiple different types of cancer) will be sent to [facility]. Jenae opted to have a saliva sample collection kit shipped directly to her home from [facility]. She will ship the kit back to [facility] for analysis. Turnaround time from date when sample is received at the lab: approximately 3-4 weeks.    Medical Management: For Jenae, we reviewed that the information from genetic testing may determine:    additional cancer screening for which Jenae may qualify (i.e. more frequent colonoscopies, more frequent dermatologic exams,  etc.),    options for risk reducing surgeries Jenae could consider (i.e. surgery to remove her ovaries and/or uterus, etc.),      and targeted chemotherapies if she were to develop certain cancers in the future (i.e. immunotherapy for individuals with Guan syndrome, PARP inhibitors, etc.).     These recommendations will be discussed in detail once genetic testing is completed.     Plan:  1) Today Jenae elected to proceed with genetic testing via the CustomNext-Cancer panel (85 genes associated with an increased risk for multiple different types of cancer) offered by [facility].  2) This information should be available in 4-5 weeks.  3) Jenae will be scheduled for a virtual visit (phone or video) to discuss the results.    Tatianna Schmitt MS, [facility]  Licensed, Certified Genetic Counselor  Office: [phone]  Email: [email or web address]

## 2022-01-03 NOTE — PATIENT INSTRUCTIONS
Assessing Cancer Risk  Only about 5-10% of cancers are thought to be due to an inherited cancer susceptibility gene.    These families often have:    Several people with the same or related types of cancer    Cancers diagnosed at a young age (before age 50)    Individuals with more than one primary cancer    Multiple generations of the family affected with cancer    Some people may be candidates for genetic testing of more than one gene.  For these families, genetic testing using a cancer panel may be offered.  These panels will test different genes known to increase the risk for breast, ovarian, uterine, and/or other cancers. All of the genes discussed below have published clinical management guidelines for individuals who are found to carry a mutation. The purpose of this handout is to serve as a brief summary of the genes analyzed by the panels used to inquire about hereditary breast and gynecologic cancer:  KALYN, BRCA1, BRCA2, BRIP1, CDH1, CHEK2, MLH1, MSH2, MSH6, PMS2, EPCAM, PTEN, PALB2, RAD51C, RAD51D, and TP53.  ______________________________________________________________________________  Hereditary Breast and Ovarian Cancer Syndrome   (BRCA1 and BRCA2)  A single mutation in one of the copies of BRCA1 or BRCA2 increases the risk for breast and ovarian cancer, among others.  The risk for pancreatic cancer and melanoma may also be slightly increased in some families.  The chart below shows the chance that someone with a BRCA mutation would develop cancer in his or her lifetime1,2,3,4.        A person s ethnic background is also important to consider, as individuals of Ashkenazi [place] ancestry have a higher chance of having a BRCA gene mutation.  There are three BRCA mutations that occur more frequently in this population.    Guan Syndrome   (MLH1, MSH2, MSH6, PMS2, and EPCAM)  Currently five genes are known to cause Guan Syndrome: MLH1, MSH2, MSH6, PMS2, and EPCAM.  A single mutation in one of the  Guan Syndrome genes increases the risk for colon, endometrial, ovarian, and stomach cancers.  Other cancers that occur less commonly in Guan Syndrome include urinary tract, skin, and brain cancers.  The chart below shows the chance that a person with Guan syndrome would develop cancer in his or her lifetime5.      *Cancer risk varies depending on Guan syndrome gene found    Cowden Syndrome   (PTEN)  Cowden syndrome is a hereditary condition that increases the risk for breast, thyroid, endometrial, colon, and kidney cancer.  Cowden syndrome is caused by a mutation in the PTEN gene.  A single mutation in one of the copies of PTEN causes Cowden syndrome and increases cancer risk.  The chart below shows the chance that someone with a PTEN mutation would develop cancer in their lifetime6,7.  Other benign features seen in some individuals with Cowden syndrome include benign skin lesions (facial papules, keratoses, lipomas), learning disability, autism, thyroid nodules, colon polyps, and larger head size.      *One recent study found breast cancer risk to be increased to 85%    Li-Fraumeni Syndrome   (TP53)  Li-Fraumeni Syndrome (LFS) is a cancer predisposition syndrome caused by a mutation in the TP53 gene. A single mutation in one of the copies of TP53 increases the risk for multiple cancers. Individuals with LFS are at an increased risk for developing cancer at a young age. The lifetime risk for development of a LFS-associated cancer is 50% by age 30 and 90% by age 60.   Core Cancers: Sarcomas, Breast, Brain, Lung, Leukemias/Lymphomas, Adrenocortical carcinomas  Other Cancers: Gastrointestinal, Thyroid, Skin, Genitourinary    Hereditary Diffuse Gastric Cancer   (CDH1)  Currently, one gene is known to cause hereditary diffuse gastric cancer (HDGC): CDH1.  Individuals with HDGC are at increased risk for diffuse gastric cancer and lobular breast cancer. Of people diagnosed with HDGC, 30-50% have a mutation in the CDH1  gene.  This suggests there are likely other genes that may cause HDGC that have not been identified yet.      Lifetime Cancer Risks    General Population HDGC    Diffuse Gastric  <1% ~80%   Breast 12% 39-52%         Additional Genes  KALYN  KALYN is a moderate-risk breast cancer gene. Women who have a mutation in KALYN can have between a 2-4 fold increased risk for breast cancer compared to the general population8. KALYN mutations have also been associated with increased risk for pancreatic cancer, however an estimate of this cancer risk is not well understood9. Individuals who inherit two KALYN mutations have a condition called ataxia-telangiectasia (AT).  This rare autosomal recessive condition affects the nervous system and immune system, and is associated with progressive cerebellar ataxia beginning in childhood.  Individuals with ataxia-telangiectasia often have a weakened immune system and have an increased risk for childhood cancers.    PALB2  Mutations in PALB2 have been shown to increase the risk of breast cancer up to 33-58% in some families; where individuals fall within this risk range is dependent upon family [id number]. PALB2 mutations have also been associated with increased risk for pancreatic cancer, although this risk has not been quantified yet.  Individuals who inherit two PALB2 mutations--one from their mother and one from their father--have a condition called Fanconi Anemia.  This rare autosomal recessive condition is associated with short stature, developmental delay, bone marrow failure, and increased risk for childhood cancers.    CHEK2   CHEK2 is a moderate-risk breast cancer gene.  Women who have a mutation in CHEK2 have around a 2-fold increased risk for breast cancer compared to the general population, and this risk may be higher depending upon family history.11,12,13 Mutations in CHEK2 have also been shown to increase the risk of a number of other cancers, including colon and prostate, however  these cancer risks are currently not well understood.    BRIP1, RAD51C and RAD51D  Mutations in BRIP1, RAD51C, and RAD51D have been shown to increase the risk of ovarian cancer and possibly female breast cancer as well14,15 .       Lifetime Cancer Risk    General Population BRIP1 RAD51C RAD51D   Ovarian 1-2% ~5-8% ~5-9% ~7-15%           Inheritance  All of the cancer syndromes reviewed above are inherited in an autosomal dominant pattern.  This means that if a parent has a mutation, each of his or her children will have a 50% chance of inheriting that same mutation.  Therefore, each child--male or female--would have a 50% chance of being at increased risk for developing cancer.      Image obtained from Genetics Home Reference, 2013     Mutations in some genes can occur de brandy, which means that a person s mutation occurred for the first time in them and was not inherited from a parent.  Now that they have the mutation, however, it can be passed on to future generations.    Genetic Testing  Genetic testing involves a blood test and will look at the genetic information in the KALYN, BRCA1, BRCA2, BRIP1, CDH1, CHEK2, MLH1, MSH2, MSH6, PMS2, EPCAM, PTEN, PALB2, RAD51C, RAD51D, and TP53 genes for any harmful mutations that are associated with increased cancer risk.  If possible, it is recommended that the person(s) who has had cancer be tested before other family members.  That person will give us the most useful information about whether or not a specific gene is associated with the cancer in the family.    Results  There are three possible results of genetic testing:    Positive--a harmful mutation was identified in one or more of the genes    Negative--no mutation was identified in any of the genes on this panel    Variant of unknown significance--a variation in one of the genes was identified, but it is unclear how this impacts cancer risk in the family    Advantages and Disadvantages   There are advantages and  disadvantages to genetic testing.    Advantages    May clarify your cancer risk    Can help you make medical decisions    May explain the cancers in your family    May give useful information to your family members (if you share your results)    Disadvantages    Possible negative emotional impact of learning about inherited cancer risk    Uncertainty in interpreting a negative test result in some situations    Possible genetic discrimination concerns (see below)    Genetic Information Nondiscrimination Act (MARIAM)  MARIAM is a federal law that protects individuals from health insurance or employment discrimination based on a genetic test result alone.  Although rare, there are currently no legal discrimination protections in terms of life insurance, long term care, or disability insurances.  Visit the [employer] [place] website to learn more.    Reducing Cancer Risk  All of the genes described above have nationally recognized cancer screening guidelines that would be recommended for individuals who test positive.  In addition to increased cancer screening, surgeries may be offered or recommended to reduce cancer risk.  Recommendations are based upon an individual s genetic test result as well as their personal and family history of cancer.    Questions to Think About Regarding Genetic Testing:    What effect will the test result have on me and my relationship with my family members if I have an inherited gene mutation?  If I don t have a gene mutation?    Should I share my test results, and how will my family react to this news, which may also affect them?    Are my children ready to learn new information that may one day affect their own health?    Hereditary Cancer Resources    FORCE: Facing Our Risk of Cancer Empowered facingourrisk.org   Bright Pink bebrightpink.org   Li-Fraumeni Syndrome Association lfsassociation.org   PTEN World PTENworld.com   No stomach for cancer, Inc.  nostomachforcancer.org   Stomach cancer relief network Scrnet.org   Collaborative Group of the Americas on Inherited Colorectal Cancer (CGA) cgaicc.com    Cancer Care cancercare.org   American Cancer Society (ACS) cancer.org   National Cancer [place] (NCI) cancer.gov     Please call us if you have any questions or concerns.   Cancer Risk Management Program [phone] ([phone])  ? Benitez Miller, MS, [facility] [place] [place] [phone]  ? Patricia Benitez, MS, [facility] [place] [place]  [phone]  ? Lashanda Henry, MS, [facility] [place] [place]  [phone]  ? Caryn Angelo, MS, [facility] [place] [place] [phone]  ? Kathya Linda, MS, [facility] [place] [place] [phone]  ? Tatianna Oskar, MS, [facility] [place] [place]  [phone]  ? Kenya Srinivasan, MS  [phone]    References  1. Damir WINCHESTER, Kt PDP, Angelito S, Param SANTIAGO, Sydney JE, Flaquita JL, Josue N, Leanne H, Cuca O, Trung A, Maribell B, Radiedward P, Manphillip S, Coy DM, Murcia N, Radha E, Valorie H, Christian E, Keysha J, Groneda J, Kell B, Easton H, Thorlacius S, Eerola H, Nevjovanna H, Manny K, Loulou OP. Average risks of breast and ovarian cancer associated with BRCA1 or BRCA2 mutations detected in case series unselected for family history: a combined analysis of 222 studies. Am J Hum Shelly. 2003;72:1117-30.  2. Janet N, Ally M, Sherrell G.  BRCA1 and BRCA2 Hereditary Breast and Ovarian Cancer. Gene Reviews online. 2013.  3. Quinn YC, Ej S, Stanford G, Tillman S. Breast cancer risk among male BRCA1 and BRCA2 mutation carriers. J Natl Cancer Inst. 2007;99:1811-4.  4. Stephen CHAPMAN, Suzette I, Garland J, Arturo E, Aly ER, Kym F. Risk of breast cancer in male BRCA2 carriers. J Med Shelly. 2010;47:710-1.  5. National Comprehensive Cancer Network. Clinical practice guidelines in oncology, colorectal cancer screening. Available online (registration required). 2015.  6. Raul BENZ, Chantel J, Ivet J, Carolyn LA, Lexus MARTIN, Eng C. Lifetime cancer risks in individuals with germline PTEN mutations. Clin Cancer Res. 2012;18:400-7.  7. Pilarski R. Cowden  Syndrome: A Critical Review of the Clinical Literature. J Shelly . 2009:18:13-27.  8. Bryan A, Roger D, Breezy S, Clover P, Franklin T, Butch M, Shmuel B, Kris H, Piper R, Dexter K, Arvin L, Stephen DG, Coy D, Roger DF, Liset MR, The Breast Cancer Susceptibility Collaboration (UK) & Braulio CHRIS. KALYN mutations that cause ataxia-telangiectasia are breast cancer susceptibility alleles. Nature Genetics. 2006;38:873-875  9. Conner N , Mildred Y, Xenia J, Hermelindo L, Audrey GM , Charli ML, Gallinger S, Gongora AG, Syngal S, Alka ML, Bao J , Negar R, Jacob SZ, Elena JR, Kelsi VE, Justine M, Vogina B, Adriano N, Jovanni RH, Masha KW, and William AP. KALYN mutations in patients with hereditary pancreatic cancer. Cancer Discover. 2012;2:41-46  10. Damir URIBE, et al. Breast-Cancer Risk in Families with Mutations in PALB2. NEJM. 2014; 371(6):497-506.  11. CHEK2 Breast Cancer Case-Control Consortium. CHEK2*1100delC and susceptibility to breast cancer: A collaborative analysis involving 10,860 breast cancer cases and 9,065 controls from 10 studies. Am J Hum Shelly, 74 (2004), pp. 2620-6005  12. Kianna T, Lan S, Yen K, et al. Spectrum of Mutations in BRCA1, BRCA2, CHEK2, and TP53 in Families at High Risk of Breast Cancer. DEAN. 2006;295(12):1603-5476.   13. Magdalena C, John D, Deborah A, et al. Risk of breast cancer in women with a CHEK2 mutation with and without a family history of breast cancer. J Clin Oncol. 2011;29:2913-4307.  14. Cristian H, Wilma E, Annie SJ, et al. Contribution of germline mutations in the RAD51B, RAD51C, and RAD51D genes to ovarian cancer in the population. J Clin Oncol. 2015;33(26):1717-1859. Doi:10.1200/JCO.2015.61.2408.  15. Lex T, Michela OHARA, Mike P, et al. Mutations in BRIP1 confer high risk of ovarian cancer. Aubree Shelly. 2011;43(11):4406-3064. doi:10.1038/ng.955.

## 2022-02-14 ENCOUNTER — VIRTUAL VISIT (OUTPATIENT)
Dept: ONCOLOGY | Facility: CLINIC | Age: 57
End: 2022-02-14
Attending: GENETIC COUNSELOR, MS
Payer: COMMERCIAL

## 2022-02-14 DIAGNOSIS — Z15.89 MONOALLELIC MUTATION OF CHEK2 GENE IN FEMALE PATIENT: Primary | ICD-10-CM

## 2022-02-14 DIAGNOSIS — Z15.01 MONOALLELIC MUTATION OF CHEK2 GENE IN FEMALE PATIENT: Primary | ICD-10-CM

## 2022-02-14 DIAGNOSIS — Z15.02 MONOALLELIC MUTATION OF CHEK2 GENE IN FEMALE PATIENT: Primary | ICD-10-CM

## 2022-02-14 DIAGNOSIS — Z15.09 MONOALLELIC MUTATION OF CHEK2 GENE IN FEMALE PATIENT: Primary | ICD-10-CM

## 2022-02-14 PROCEDURE — 96040 HC GENETIC COUNSELING, EACH 30 MINUTES: CPT | Mod: GT,95 | Performed by: GENETIC COUNSELOR, MS

## 2022-02-14 NOTE — LETTER
Cancer Risk Management  Program [facility]  [facility] [place] [place] [facility]  Mailing Address  Cancer Risk Management Program  [address]  [place] [address]    New patient appointments  [phone]  April 17, 2022    Jenae A Angela  [address] [place] [address]      Dear Jenae,    It was a pleasure speaking with you over video for genetic counseling on 2/14/2022. Here is a copy of the progress note from our discussion. If you have any additional questions, please feel free to call.    Referring Provider: Tonya Mtz MD    Presenting Information:   I spoke with Jenae over video to discuss her genetic testing results. Testing was performed on a saliva sample collected by Jenae at her home. The CustomNext-Cancer panel (85 genes associated with an increased risk for multiple different types of cancer) was ordered from [employer]. This testing was done because of her personal and family history of cancer.     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 Specifically her mutation is called [id number]. We discussed that this mutation is associated with an increased risk for breast and colon cancer. We discussed the impact of this testing on Jenae in detail.     Of note, Jenae tested negative for mutations in the following genes by sequencing and deletion/duplication analysis: AIP, ALK, APC, KALYN, AXIN2, BAP1, BARD1, BLM, BMPR1A, BRCA1, BRCA2, BRIP1, CDC73, CDH1, CDK4, CDKN1B, CDKN2A, CTNNA1, DICER1, FANCC, FH, FLCN, GALNT12, KIF1B, LZTR1, MAX, MEN1, MET, MLH1, MRE11A, MSH2, MSH3, MSH6, MUTYH, NBN, NF1, NF2, NTHL1, PALB2, PHOX2B, PMS2, POT1, RWEIX3E, PTCH1, PTEN, RAD50, RAD51C, RAD51D, RB1, RECQL, RET, SDHA, SDHAF2, SDHB, SDHC,  SDHD, SMAD4, SMARCA4, SMARCB1, SMARCE1, STK11, SUFU, EYRX361, TP53, TSC1, TSC2, VHL and XRCC2 (sequencing and deletion/duplication); EGFR, EGLN1, PFM666I, HOXB13, KIT, MITF, MLH3, PALLD, PDGFRA, POLD1, POLE, RINT1, RPS20 and TERT (sequencing only); EPCAM and GREM1 (deletion/duplication only). We reviewed the autosomal dominant inheritance of these genes. Mutations in these genes do not skip generations.      Cancer Risks:    Mutations in the CHEK2 gene are associated with a moderate risk of breast and colon cancer.     The lifetime breast cancer risk for women who carry one CHEK2 mutation is approximately 15-40%, compared to the lifetime population risk of breast cancer of 12.8%.    - There is also an increased risk of a second breast cancer in women with a mutation in the CHEK2 gene, though the exact risk is unknown at this time.       The risk of colon cancer may be twice as high as the general population risk of colon cancer of 5%.     We also discussed the possible association of CHEK2 mutations with increased risk for prostate, melanoma, thyroid, and other cancers, however data is still limited in this area. No confirmed risk numbers are available for these additional cancers, though they have been reported in families that have a CHEK2 mutation.    Of note, we reviewed that the CHEK2 gene is currently considered a moderate-risk gene. This means that mutations in this gene increase the risk for certain cancers, but are unlikely to be the single cause for an individual's cancer. There are likely other genetic and/or environmental risk factors that, in combination with a CHEK2 gene mutation, cause cancer.    Cancer Screening and Prevention:  The following screening is recommended for individuals who have a mutation in the CHEK2 gene, per current National Comprehensive Cancer Network (NCCN) guidelines.    Women with CHEK2 mutations qualify for high risk breast screening.  o High risk breast cancer screening  recommendations include annual mammograms and annual breast MRIs, alternating every 6 months.    o It is typically recommended that this screening begin at age 40, though this can be discussed in more detail with a woman's medical providers.    Current guidelines recommend colon cancer screening in individuals who have a mutation in the CHEK2 gene.   o Colonoscopies are done every 5 years, beginning at age 40 (or based on family history of colon cancer).      There are currently no other specific cancer screening guidelines for other cancers potentially associated with a CHEK2 mutation. As such, additional screening should be based on family history.    Other screening based on Jenae's personal and family history:    Jenae has already had a bilateral mastectomy in 2010 as part of her breast cancer treatment.    She has also already been having her colonoscopies every 5 years, due to her personal and family history of colon polyps. She should continue having her colonoscopies at least every 5 years given this new finding of a CHEK2 mutation as outlined above.    Other population cancer screening options, such as dermatologic exams, gynecologic exams, and those recommended by the American Cancer Society and the National Comprehensive Cancer Network (NCCN), are also appropriate for Jenae and her family. These screening recommendations may change if there are changes to Jenae's personal and/or family history of cancer. Final screening recommendations should be made by each individual's primary care provider.    We discussed that Jenae could participate in our Cancer Risk Management Program in which our nursing specialist provides an individual screening plan and assists with medical management. However, given that she has had a bilateral mastectomy and is already having her colonoscopies every 5 years, we reviewed that there is nothing to add to her screening plan at this time. Therefore, it is  recommended that she check in with me annually to review the most current screening guidelines for individuals with a CHEK2 mutation. If there are new cancer risks identified or other changes to these guidelines in the future that would require additional screening for Jenae, she will be referred to our Cancer Risk Management Program at that time.    Of note, the above information is based on our current understanding of Jenae's genetic findings. Jenae is encouraged to reach out to me regularly regarding any pertinent updates to her personal and/or family history of cancer, as our understanding of the genetic findings in her family may change over time.     Implications for Family Members:  We reviewed that mutations in the CHEK2 gene are inherited in an autosomal dominant pattern. This means that each of her siblings has a 50% chance of having the same mutation. Additionally, one of her parents likely also had the same mutation. As her father is still alive, we reviewed that he could consider genetic testing to determine risk for himself, as well as which other extended relatives may also carry this mutation. She is encouraged to share this information with her family members on both sides of the family. I am happy to help her relatives connect with a genetic counselor in their area if they would like to discuss testing.    Additional Testing Considerations:  Even though Jenae's genetic testing result was positive, other relatives may carry a different gene mutation associated with an increased risk for cancer. Genetic counseling is recommended for her sisters, father, and extended maternal and paternal relatives to discuss genetic testing options. If any of these relatives do pursue genetic testing, Jenae is encouraged to contact me so that we may review the impact of their test results on her.    Support Resources:  Bright Pink    www.brightpink.org  [employer] is the only national non-profit  organization focused on prevention and early detection of breast and ovarian cancer in young women.  Bright Pink aims to reach the 52 million young women in the [place] between the ages of 18 and 45 with innovative, life-saving breast and ovarian health programs, thereby empowering this and future generations of women to live healthier, happier, and longer lives.  FORCE: Facing Our Risk of Cancer Empowered  www.facingourrisk.org  FORCE s mission is to improve the lives of individuals and families affected by hereditary breast, ovarian, and related cancers by creating awareness, supplying information and support to the community, advocating for and supporting research, and working with the research and medical communities.     Helpline: 1-286.238.7597    Message boards    Peer Navigation and local support groups  [employer]   www.TaquillaterYappsa App Store.org  Based in the [facility], the Firefly hood connects women diagnosed with breast cancer with trained breast cancer survivors to offer support, guidance and hope.  [employer] www.inthinclubtwincities.org  [facility] [employer] [place] is a 501(c)3 nonprofit and the local affiliate of the Cancer Support Community, a network of more than 54 supportive, free and welcoming  clubhouses  where everyone living with cancer can come for social, emotional and psychological support. Clubs are healing environments where individuals learn from each other with guidance from licensed professionals.  National Society of Genetic Counselors https://www.nsgc.org  The National Society of Genetic Counselors advances the various roles of genetic counselors in health care by fostering education, research, and public policy to ensure the availability of quality genetic services.  A number of resources are available through this website for providers and patients seeking additional information about genetic counseling services.   Books:    The Breast Reconstruction  "Guidebook: Issues and Answers from Research to Recovery (2012), by Esha Moran    Confronting Hereditary Breast and Ovarian Cancer: Identify Your Risk, Understand Your Options, Change Your Vanesa (2012), by Shyanne Desai    Positive Results: Making the Best Decisions When You re at High Risk for Breast or Ovarian Cancer (2010), by Latasha Stafford and Dr. Dora Julien.     Previvors: Facing the Breast Cancer Gene and Making Life-Changing Decisions (2010), by Sintia Suero    Plan:  1.  Jenae will be mailed a copy of her test results along with this letter. A copy of her results was also released to her today via the online [employer] portal.  2.  I will provide a \"Dear Relative\" letter for Jenae to share with her family members.  3.  She plans to follow up with her physicians.    If Jenae has additional questions, I encouraged her to contact me directly at [phone].     Tatianna Schmitt MS, [facility] [place]  Licensed, Certified Genetic Counselor  Office: [phone]  Email: [email or web address]                                                4/17/2022    Dear Relative,    The purpose of this letter is to inform you that your relative recently underwent genetic counseling and genetic testing due to a personal and family history of cancer.    The testing done through [employer] identified a mutation in the CHEK2 gene. Specifically, the mutation is called [id number]. The accession # linked to your relative's report is: [id number].    A mutation (or change in the genetic code) causes a specific gene to stop working properly. Ultimately, individuals who have a mutation in the CHEK2 gene are at increased risk for several types of cancer.    Individuals who carry a CHEK2 gene mutation are at increased risk for developing breast cancer (approximately 2-3 fold higher than the average risk of 12%). CHEK2 gene mutations are also associated with a moderately increased lifetime risk for colorectal cancer. There may " also be increased risk for other cancers. Both men and women can have mutations in the CHEK2 gene and have a 50% chance of passing a mutation on to each of their children.     If individuals are found to have mutations in the CHEK2 gene, increased cancer screening at younger ages may be recommended. All cancer risk management options should be discussed in more detail with an individual s medical providers.     I understand that this may be surprising, unexpected, and even scary news. Because this mutation has been identified in your family, you are at risk for having the same mutation. As mentioned earlier, your children may also be at risk.      Scheduling a genetic counseling appointment does not mean you have to undergo genetic testing. The decision to pursue such testing is a very personal one that is discussed in more detail during the session. Much of cancer genetic counseling is providing valuable information to individuals who are impacted by genetic information such as this.      If you are interested in scheduling a genetic counseling appointment at [facility], please call [phone] to schedule an appointment. You can also find a genetic counselor close to you or at another health system at [facility]    Sincerely,     Tatianna Schmitt MS, [facility]  Licensed, Certified Genetic Counselor  Office: [phone]  Email: [email or web address]

## 2022-02-14 NOTE — PROGRESS NOTES
2/14/2022    Jenae is a 56 year old who is being evaluated via a billable video visit.      Video-Visit Details    Type of service: Video Visit    Video Start Time: 03:34 pm  Video End Time: 04:03 pm    Originating Location (pt. Location): Home    Distant Location (provider location): Cancer Risk Management Program    Platform used for Video Visit: Pierre    Referring Provider: Tonya Mtz MD    Presenting Information:   I spoke with Jenae over video to discuss her genetic testing results. Testing was performed on a saliva sample collected by Jenae at her home. The CustomNext-Cancer panel (85 genes associated with an increased risk for multiple different types of cancer) was ordered from [facility]. This testing was done because of her personal and family history of cancer.     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 Specifically her mutation is called [id number]. We discussed that this mutation is associated with an increased risk for breast and colon cancer. We discussed the impact of this testing on Jenae in detail.     Of note, Jenae tested negative for mutations in the following genes by sequencing and deletion/duplication analysis: AIP, ALK, APC, KALYN, AXIN2, BAP1, BARD1, BLM, BMPR1A, BRCA1, BRCA2, BRIP1, CDC73, CDH1, CDK4, CDKN1B, CDKN2A, CTNNA1, DICER1, FANCC, FH, FLCN, GALNT12, KIF1B, LZTR1, MAX, MEN1, MET, MLH1, MRE11A, MSH2, MSH3, MSH6, MUTYH, NBN, NF1, NF2, NTHL1, PALB2, PHOX2B, PMS2, POT1, AJFER3G, PTCH1, PTEN, RAD50, RAD51C, RAD51D, RB1, RECQL, RET, SDHA, SDHAF2, SDHB, SDHC, SDHD, SMAD4, SMARCA4, SMARCB1, SMARCE1, STK11, SUFU, DAPZ350, TP53, TSC1, TSC2, VHL and XRCC2 (sequencing and deletion/duplication); EGFR, EGLN1, QST515T, HOXB13, KIT, MITF, MLH3, PALLD, PDGFRA, POLD1, POLE, RINT1, RPS20 and TERT (sequencing only); EPCAM and GREM1 (deletion/duplication only). We reviewed the autosomal dominant inheritance of these genes.  "Mutations in these genes do not skip generations.      A copy of the test report can be found in the Laboratory tab, dated 1/11/22, and named \"LABORATORY MISCELLANEOUS ORDER\". The report is scanned in as a linked document.    Cancer Risks:    Mutations in the CHEK2 gene are associated with a moderate risk of breast and colon cancer.     The lifetime breast cancer risk for women who carry one CHEK2 mutation is approximately 15-40%, compared to the lifetime population risk of breast cancer of 12.8%.    - There is also an increased risk of a second breast cancer in women with a mutation in the CHEK2 gene, though the exact risk is unknown at this time.       The risk of colon cancer may be twice as high as the general population risk of colon cancer of 5%.     We also discussed the possible association of CHEK2 mutations with increased risk for prostate, melanoma, thyroid, and other cancers, however data is still limited in this area. No confirmed risk numbers are available for these additional cancers, though they have been reported in families that have a CHEK2 mutation.    Of note, we reviewed that the CHEK2 gene is currently considered a moderate-risk gene. This means that mutations in this gene increase the risk for certain cancers, but are unlikely to be the single cause for an individual's cancer. There are likely other genetic and/or environmental risk factors that, in combination with a CHEK2 gene mutation, cause cancer.    Cancer Screening and Prevention:  The following screening is recommended for individuals who have a mutation in the CHEK2 gene, per current National Comprehensive Cancer Network (NCCN) guidelines.    Women with CHEK2 mutations qualify for high risk breast screening.  o High risk breast cancer screening recommendations include annual mammograms and annual breast MRIs, alternating every 6 months.    o It is typically recommended that this screening begin at age 40, though this can be discussed " in more detail with a woman's medical providers.    Current guidelines recommend colon cancer screening in individuals who have a mutation in the CHEK2 gene.   o Colonoscopies are done every 5 years, beginning at age 40 (or based on family history of colon cancer).      There are currently no other specific cancer screening guidelines for other cancers potentially associated with a CHEK2 mutation. As such, additional screening should be based on family history.    Other screening based on Jenae's personal and family history:    Jenae has already had a bilateral mastectomy in 2010 as part of her breast cancer treatment.    She has also already been having her colonoscopies every 5 years, due to her personal and family history of colon polyps. She should continue having her colonoscopies at least every 5 years given this new finding of a CHEK2 mutation as outlined above.    Other population cancer screening options, such as dermatologic exams, gynecologic exams, and those recommended by the American Cancer Society and the National Comprehensive Cancer Network (NCCN), are also appropriate for Jenae and her family. These screening recommendations may change if there are changes to Jenae's personal and/or family history of cancer. Final screening recommendations should be made by each individual's primary care provider.    We discussed that Jenae could participate in our Cancer Risk Management Program in which our nursing specialist provides an individual screening plan and assists with medical management. However, given that she has had a bilateral mastectomy and is already having her colonoscopies every 5 years, we reviewed that there is nothing to add to her screening plan at this time. Therefore, it is recommended that she check in with me annually to review the most current screening guidelines for individuals with a CHEK2 mutation. If there are new cancer risks identified or other changes to these  guidelines in the future that would require additional screening for Jenae, she will be referred to our Cancer Risk Management Program at that time.    Of note, the above information is based on our current understanding of Jenae's genetic findings. Jenae is encouraged to reach out to me regularly regarding any pertinent updates to her personal and/or family history of cancer, as our understanding of the genetic findings in her family may change over time.     Implications for Family Members:  We reviewed that mutations in the CHEK2 gene are inherited in an autosomal dominant pattern. This means that each of her siblings has a 50% chance of having the same mutation. Additionally, one of her parents likely also had the same mutation. As her father is still alive, we reviewed that he could consider genetic testing to determine risk for himself, as well as which other extended relatives may also carry this mutation. She is encouraged to share this information with her family members on both sides of the family. I am happy to help her relatives connect with a genetic counselor in their area if they would like to discuss testing.    Additional Testing Considerations:  Even though Jenae's genetic testing result was positive, other relatives may carry a different gene mutation associated with an increased risk for cancer. Genetic counseling is recommended for her sisters, father, and extended maternal and paternal relatives to discuss genetic testing options. If any of these relatives do pursue genetic testing, Jenae is encouraged to contact me so that we may review the impact of their test results on her.    Support Resources:  Bright Pink    www.ByteLight.org  [employer] is the only national non-profit organization focused on prevention and early detection of breast and ovarian cancer in young women.  Bright Pink aims to reach the 52 million young women in the [place] between the ages of 18 and 45  with innovative, life-saving breast and ovarian health programs, thereby empowering this and future generations of women to live healthier, happier, and longer lives.  FORCE: Facing Our Risk of Cancer Empowered  www.facingourrisk.org  FORCE s mission is to improve the lives of individuals and families affected by hereditary breast, ovarian, and related cancers by creating awareness, supplying information and support to the community, advocating for and supporting research, and working with the research and medical communities.     Helpline: 1-843.872.5763    Message boards    Peer Navigation and local support groups  [employer]   www.Decide.comterEverybodyCar.org  Based in the [facility], the Firefly EverybodyCar connects women diagnosed with breast cancer with trained breast cancer survivors to offer support, guidance and hope.  [employer] [place] www.BetterFit Technologieslubtwincities.org  [facility] [place] is a 501(c)3 nonprofit and the local affiliate of the Cancer Support Community, a network of more than 54 supportive, free and welcoming  clubhouses  where everyone living with cancer can come for social, emotional and psychological support. Clubs are healing environments where individuals learn from each other with guidance from licensed professionals.  National Society of Genetic Counselors https://www.nsgc.org  The National Society of Genetic Counselors advances the various roles of genetic counselors in health care by fostering education, research, and public policy to ensure the availability of quality genetic services.  A number of resources are available through this website for providers and patients seeking additional information about genetic counseling services.   Books:    The Breast Reconstruction Guidebook: Issues and Answers from Research to Recovery (2012), by Esha Moran    Confronting Hereditary Breast and Ovarian Cancer: Identify Your Risk, Understand Your Options, Change Your Vanesa (2012),  "by Shyanne Desai    Positive Results: Making the Best Decisions When You re at High Risk for Breast or Ovarian Cancer (2010), by Latasha Stafford and Dr. Dora Julien.     Previvors: Facing the Breast Cancer Gene and Making Life-Changing Decisions (2010), by Sintia Suero    Plan:  1.  Jenae will be mailed a copy of her test results along with this letter. A copy of her results was also released to her today via the online [employer] portal.  2.  I will provide a \"Dear Relative\" letter for Jenae to share with her family members.  3.  She plans to follow up with her physicians.    If Jenae has additional questions, I encouraged her to contact me directly at [phone].     Time spent over video: 29 minutes    Tatianna Schmitt MS, [facility] [place]  Licensed, Certified Genetic Counselor  Office: [phone]  Email: [email or web address]    "

## 2022-02-14 NOTE — Clinical Note
Please send copy of letter to patient with test results. Please enclose test results: Other Laboratory; [employer]; [facility]-Cancer Panel, genetic testing (Laboratory Miscellaneous Order) [[id number]] (Order [id number])

## 2022-02-24 ENCOUNTER — MYC MEDICAL ADVICE (OUTPATIENT)
Dept: INTERNAL MEDICINE | Facility: CLINIC | Age: 57
End: 2022-02-24
Payer: COMMERCIAL

## 2022-02-24 DIAGNOSIS — I10 HYPERTENSION, GOAL BELOW 140/90: Primary | ICD-10-CM

## 2022-02-25 RX ORDER — TRIAMTERENE AND HYDROCHLOROTHIAZIDE 37.5; 25 MG/1; MG/1
1 CAPSULE ORAL EVERY MORNING
Qty: 90 CAPSULE | Refills: 3 | Status: SHIPPED | OUTPATIENT
Start: 2022-02-25 | End: 2022-12-08 | Stop reason: ALTCHOICE

## 2022-04-17 PROBLEM — Z15.89 MONOALLELIC MUTATION OF CHEK2 GENE IN FEMALE PATIENT: Status: ACTIVE | Noted: 2022-02-14

## 2022-04-17 PROBLEM — Z15.02 MONOALLELIC MUTATION OF CHEK2 GENE IN FEMALE PATIENT: Status: ACTIVE | Noted: 2022-02-14

## 2022-04-17 PROBLEM — Z15.09 MONOALLELIC MUTATION OF CHEK2 GENE IN FEMALE PATIENT: Status: ACTIVE | Noted: 2022-02-14

## 2022-04-17 PROBLEM — Z15.01 MONOALLELIC MUTATION OF CHEK2 GENE IN FEMALE PATIENT: Status: ACTIVE | Noted: 2022-02-14

## 2022-07-15 LAB
ALT SERPL-CCNC: 19 IU/L (ref 8–45)
AST SERPL-CCNC: 24 IU/L (ref 2–40)
CREATININE (EXTERNAL): 1.11 MG/DL (ref 0.57–1.11)
HBA1C MFR BLD: 5.5 %
POTASSIUM (EXTERNAL): 4.3 MMOL/L (ref 3.5–5)
TSH SERPL-ACNC: 0.66 UIU/ML (ref 0.35–4.94)

## 2022-08-19 ENCOUNTER — TRANSFERRED RECORDS (OUTPATIENT)
Dept: MULTI SPECIALTY CLINIC | Facility: CLINIC | Age: 57
End: 2022-08-19

## 2022-08-19 LAB
HPV ABSTRACT: ABNORMAL
PAP-ABSTRACT: NORMAL

## 2022-11-22 ENCOUNTER — TRANSFERRED RECORDS (OUTPATIENT)
Dept: HEALTH INFORMATION MANAGEMENT | Facility: CLINIC | Age: 57
End: 2022-11-22

## 2022-12-07 PROBLEM — E66.1 CLASS 3 DRUG-INDUCED OBESITY WITHOUT SERIOUS COMORBIDITY WITH BODY MASS INDEX (BMI) OF 40.0 TO 44.9 IN ADULT (H): Status: ACTIVE | Noted: 2022-12-07

## 2022-12-07 ASSESSMENT — ENCOUNTER SYMPTOMS
ARTHRALGIAS: 1
NERVOUS/ANXIOUS: 1
BREAST MASS: 0

## 2022-12-07 ASSESSMENT — ASTHMA QUESTIONNAIRES
QUESTION_3 LAST FOUR WEEKS HOW OFTEN DID YOUR ASTHMA SYMPTOMS (WHEEZING, COUGHING, SHORTNESS OF BREATH, CHEST TIGHTNESS OR PAIN) WAKE YOU UP AT NIGHT OR EARLIER THAN USUAL IN THE MORNING: NOT AT ALL
QUESTION_4 LAST FOUR WEEKS HOW OFTEN HAVE YOU USED YOUR RESCUE INHALER OR NEBULIZER MEDICATION (SUCH AS ALBUTEROL): NOT AT ALL
QUESTION_1 LAST FOUR WEEKS HOW MUCH OF THE TIME DID YOUR ASTHMA KEEP YOU FROM GETTING AS MUCH DONE AT WORK, SCHOOL OR AT HOME: NONE OF THE TIME
QUESTION_2 LAST FOUR WEEKS HOW OFTEN HAVE YOU HAD SHORTNESS OF BREATH: NOT AT ALL
QUESTION_5 LAST FOUR WEEKS HOW WOULD YOU RATE YOUR ASTHMA CONTROL: COMPLETELY CONTROLLED
ACT_TOTALSCORE: 25
ACT_TOTALSCORE: 25

## 2022-12-08 ENCOUNTER — OFFICE VISIT (OUTPATIENT)
Dept: INTERNAL MEDICINE | Facility: CLINIC | Age: 57
End: 2022-12-08
Payer: COMMERCIAL

## 2022-12-08 VITALS
DIASTOLIC BLOOD PRESSURE: 85 MMHG | WEIGHT: 276 LBS | HEIGHT: 67 IN | HEART RATE: 95 BPM | SYSTOLIC BLOOD PRESSURE: 144 MMHG | BODY MASS INDEX: 43.32 KG/M2 | TEMPERATURE: 97.6 F | OXYGEN SATURATION: 100 %

## 2022-12-08 DIAGNOSIS — Z98.84 BARIATRIC SURGERY STATUS: ICD-10-CM

## 2022-12-08 DIAGNOSIS — Z15.01 MONOALLELIC MUTATION OF CHEK2 GENE IN FEMALE PATIENT: ICD-10-CM

## 2022-12-08 DIAGNOSIS — I10 HYPERTENSION, GOAL BELOW 140/90: ICD-10-CM

## 2022-12-08 DIAGNOSIS — L60.8 CHANGE IN NAIL APPEARANCE: ICD-10-CM

## 2022-12-08 DIAGNOSIS — Z15.09 MONOALLELIC MUTATION OF CHEK2 GENE IN FEMALE PATIENT: ICD-10-CM

## 2022-12-08 DIAGNOSIS — Z12.4 CERVICAL CANCER SCREENING: ICD-10-CM

## 2022-12-08 DIAGNOSIS — Z00.01 ENCOUNTER FOR GENERAL ADULT MEDICAL EXAMINATION WITH ABNORMAL FINDINGS: Primary | ICD-10-CM

## 2022-12-08 DIAGNOSIS — Z15.02 MONOALLELIC MUTATION OF CHEK2 GENE IN FEMALE PATIENT: ICD-10-CM

## 2022-12-08 DIAGNOSIS — J45.20 MILD INTERMITTENT ASTHMA WITHOUT COMPLICATION: ICD-10-CM

## 2022-12-08 DIAGNOSIS — E66.813 CLASS 3 DRUG-INDUCED OBESITY WITHOUT SERIOUS COMORBIDITY WITH BODY MASS INDEX (BMI) OF 40.0 TO 44.9 IN ADULT (H): ICD-10-CM

## 2022-12-08 DIAGNOSIS — Z15.89 MONOALLELIC MUTATION OF CHEK2 GENE IN FEMALE PATIENT: ICD-10-CM

## 2022-12-08 DIAGNOSIS — Z90.13 STATUS POST MASTECTOMY, BILATERAL: ICD-10-CM

## 2022-12-08 DIAGNOSIS — D12.6 SERRATED ADENOMA OF COLON: ICD-10-CM

## 2022-12-08 DIAGNOSIS — E66.1 CLASS 3 DRUG-INDUCED OBESITY WITHOUT SERIOUS COMORBIDITY WITH BODY MASS INDEX (BMI) OF 40.0 TO 44.9 IN ADULT (H): ICD-10-CM

## 2022-12-08 LAB
KOH PREPARATION: NORMAL
KOH PREPARATION: NORMAL

## 2022-12-08 PROCEDURE — 99396 PREV VISIT EST AGE 40-64: CPT | Performed by: INTERNAL MEDICINE

## 2022-12-08 PROCEDURE — 99213 OFFICE O/P EST LOW 20 MIN: CPT | Mod: 25 | Performed by: INTERNAL MEDICINE

## 2022-12-08 PROCEDURE — 87220 TISSUE EXAM FOR FUNGI: CPT | Performed by: INTERNAL MEDICINE

## 2022-12-08 RX ORDER — LOSARTAN POTASSIUM AND HYDROCHLOROTHIAZIDE 12.5; 5 MG/1; MG/1
1 TABLET ORAL DAILY
Qty: 90 TABLET | Refills: 3 | Status: SHIPPED | OUTPATIENT
Start: 2022-12-08 | End: 2023-11-21

## 2022-12-08 ASSESSMENT — ENCOUNTER SYMPTOMS
NERVOUS/ANXIOUS: 1
ARTHRALGIAS: 1
BREAST MASS: 0

## 2022-12-08 NOTE — Clinical Note
Please abstract the following data from this visit with this patient into the appropriate field in Epic:  Tests that can be patient reported without a hard copy:  Pap smear done on this date:  PAP Janet 8/19/22 (approximately), by this group: Janet, results were  stable/negative. .   Other Tests found in the patient's chart through Chart Review/Care Everywhere:  Pap smear done by this group [place] this date:  PAP   8/19/22  Note to [employer]: If this section is blank, no results were found via Chart Review/Care Everywhere.

## 2022-12-08 NOTE — PATIENT INSTRUCTIONS
">>Change triamterene/hydrochlorothiazide to losartan/hydrochlorothiazide 50/12.5  >>Get [employer] BP cuff, start checking a couple times per week  >>Goal Blood pressure is less than 140/90   >>Recheck BP and labs in about 3 weeks \"ancillary visit\"   bring your cuff to correlate.         "

## 2022-12-08 NOTE — PROGRESS NOTES
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  She will call surgeon  Monoallelic mutation of CHEK2 gene in female patient       Serrated adenoma of colon       Cervical cancer screening  Per [facility] summer 2022     Hypertension, goal below 140/90   will change maxide to losartan/hctz .   - losartan-hydrochlorothiazide (HYZAAR) 50-12.5 MG tablet; Take 1 tablet by mouth daily  Recheck BMP in a couple weeks with ancillary BP recheck.     Change in nail appearance   rule out fungus.    - KOH prep (skin, hair or nails only)              BMI:   Estimated body mass index is 43.23 kg/m  as calculated from the following:    Height as of this encounter: 1.702 m (5' 7\").    Weight as of this encounter: 125.2 kg (276 lb).   Weight management plan: Patient referred to endocrine and/or weight management specialty         No follow-ups on file.    Tonya Mtz MD  [facility] JACEY Holm is a 57 year old, presenting for the following health issues:  No chief complaint on file.    56 y/o F here for AFE.  H/o cervical dysplasia (PAP via OBGYN, [facility]),  RITU (resolved w/weight loss), Br Ca (bilateral mastectomy 2010), MO (BMI 38), djd knees, TMJ (mouth guard), intermittent asthma, Sleeve gastrectomy (2015, weight loss ),   WEight is at 276#    Only 15# below her pre surgery sleeve procedure.      Healthy Habits:     Getting at least 3 servings of Calcium per day:  Yes    Bi-annual eye exam:  Yes    Dental care twice a year:  Yes    Sleep apnea or symptoms of sleep apnea:  None    Diet:  Regular " "(no restrictions)    Frequency of exercise:  6-7 days/week    Duration of exercise:  45-60 minutes    Taking medications regularly:  Yes    Medication side effects:  None    PHQ-2 Total Score: 0    Additional concerns today:  No             Review of Systems   Breasts:  Negative for tenderness, breast mass and discharge.   Genitourinary: Negative for pelvic pain, vaginal bleeding and vaginal discharge.   Musculoskeletal: Positive for arthralgias.   Psychiatric/Behavioral: The patient is nervous/anxious.              Objective    BP (!) 144/85 (BP Location: Left arm, Patient Position: Sitting, Cuff Size: Adult Large)   Pulse 95   Temp 97.6  F (36.4  C) (Oral)   Ht 1.702 m (5' 7\")   Wt 125.2 kg (276 lb)   SpO2 100%   BMI 43.23 kg/m    There is no height or weight on file to calculate BMI.  Physical Exam   GENERAL: alert, no distress and obese  EYES: Eyes grossly normal to inspection, PERRL and conjunctivae and sclerae normal  HENT: ear canals and TM's normal, nose and mouth without ulcers or lesions  NECK: no adenopathy, no asymmetry, masses, or scars and thyroid normal to palpation  RESP: lungs clear to auscultation - no rales, rhonchi or wheezes  BREAST: giselle mastectomy with reconstruction.   CV: regular rate and rhythm, normal S1 S2, no S3 or S4, no murmur, click or rub, no peripheral edema and peripheral pulses strong  ABDOMEN: soft, nontender, no hepatosplenomegaly, no masses and bowel sounds normal  MS: no gross musculoskeletal defects noted, no edema  SKIN: no suspicious lesions or rashes  NEURO: Normal strength and tone, mentation intact and speech normal  PSYCH: mentation appears normal, affect normal/bright  LYMPH: no cervical, supraclavicular, axillary, or inguinal adenopathy    Lab on 10/08/2021   Component Date Value Ref Range Status     Hepatitis C Antibody 10/08/2021 Nonreactive  Nonreactive Final     Sodium 10/08/2021 140  133 - 144 mmol/L Final     Potassium 10/08/2021 4.1  3.4 - 5.3 mmol/L Final "     Chloride 10/08/2021 106  94 - 109 mmol/L Final     Carbon Dioxide (CO2) 10/08/2021 30  20 - 32 mmol/L Final     Anion Gap 10/08/2021 4  3 - 14 mmol/L Final     Urea Nitrogen 10/08/2021 14  7 - 30 mg/dL Final     Creatinine 10/08/2021 0.92  0.52 - 1.04 mg/dL Final     Calcium 10/08/2021 9.3  8.5 - 10.1 mg/dL Final     Glucose 10/08/2021 88  70 - 99 mg/dL Final     GFR Estimate 10/08/2021 71  >60 mL/min/1.73m2 Final    As of July 11, 2021, eGFR is calculated by the CKD-EPI creatinine equation, without race adjustment. eGFR can be influenced by muscle mass, exercise, and diet. The reported eGFR is an estimation only and is only applicable if the renal function is stable.     Cholesterol 10/08/2021 205 (H)  <200 mg/dL Final     Triglycerides 10/08/2021 91  <150 mg/dL Final     Direct Measure HDL 10/08/2021 108  >=50 mg/dL Final     LDL Cholesterol Calculated 10/08/2021 79  <=100 mg/dL Final     Non HDL Cholesterol 10/08/2021 97  <130 mg/dL Final     Patient Fasting > 8hrs? 10/08/2021 Yes   Final     larry did a lot of labs this summer

## 2022-12-30 ENCOUNTER — LAB (OUTPATIENT)
Dept: LAB | Facility: CLINIC | Age: 57
End: 2022-12-30
Payer: COMMERCIAL

## 2022-12-30 ENCOUNTER — ALLIED HEALTH/NURSE VISIT (OUTPATIENT)
Dept: FAMILY MEDICINE | Facility: CLINIC | Age: 57
End: 2022-12-30
Payer: COMMERCIAL

## 2022-12-30 VITALS — HEART RATE: 66 BPM | DIASTOLIC BLOOD PRESSURE: 80 MMHG | SYSTOLIC BLOOD PRESSURE: 126 MMHG

## 2022-12-30 DIAGNOSIS — I10 HYPERTENSION, GOAL BELOW 140/90: Primary | ICD-10-CM

## 2022-12-30 DIAGNOSIS — I10 HYPERTENSION, GOAL BELOW 140/90: ICD-10-CM

## 2022-12-30 LAB
ANION GAP SERPL CALCULATED.3IONS-SCNC: 7 MMOL/L (ref 3–14)
BUN SERPL-MCNC: 13 MG/DL (ref 7–30)
CALCIUM SERPL-MCNC: 9.7 MG/DL (ref 8.5–10.1)
CHLORIDE BLD-SCNC: 106 MMOL/L (ref 94–109)
CO2 SERPL-SCNC: 28 MMOL/L (ref 20–32)
CREAT SERPL-MCNC: 0.88 MG/DL (ref 0.52–1.04)
GFR SERPL CREATININE-BSD FRML MDRD: 77 ML/MIN/1.73M2
GLUCOSE BLD-MCNC: 98 MG/DL (ref 70–99)
POTASSIUM BLD-SCNC: 4.1 MMOL/L (ref 3.4–5.3)
SODIUM SERPL-SCNC: 141 MMOL/L (ref 133–144)

## 2022-12-30 PROCEDURE — 80048 BASIC METABOLIC PNL TOTAL CA: CPT

## 2022-12-30 PROCEDURE — 99207 PR NO CHARGE NURSE ONLY: CPT

## 2022-12-30 PROCEDURE — 36415 COLL VENOUS BLD VENIPUNCTURE: CPT

## 2022-12-30 NOTE — PROGRESS NOTES
Jenae Miller is a 57 year old patient who comes in today for a Blood Pressure check.  Initial BP:  /80 (BP Location: Other (Comment), Patient Position: Sitting, Cuff Size: Adult Large)   Pulse 66      66  Disposition: results routed to provider    Taylor Godoy MA

## 2022-12-31 NOTE — RESULT ENCOUNTER NOTE
Jenae Miller    Electrolytes, kidney function and blood sugar look great.       Sincerely,       MARILIA POPE M.D.

## 2023-02-26 ENCOUNTER — TRANSFERRED RECORDS (OUTPATIENT)
Dept: OTOLARYNGOLOGY | Facility: CLINIC | Age: 58
End: 2023-02-26

## 2023-03-15 ENCOUNTER — MEDICAL CORRESPONDENCE (OUTPATIENT)
Dept: HEALTH INFORMATION MANAGEMENT | Facility: CLINIC | Age: 58
End: 2023-03-15

## 2023-03-30 ENCOUNTER — OFFICE VISIT (OUTPATIENT)
Dept: SURGERY | Facility: CLINIC | Age: 58
End: 2023-03-30
Payer: COMMERCIAL

## 2023-03-30 ENCOUNTER — TELEPHONE (OUTPATIENT)
Dept: SURGERY | Facility: CLINIC | Age: 58
End: 2023-03-30

## 2023-03-30 VITALS
HEART RATE: 70 BPM | HEIGHT: 67 IN | WEIGHT: 279 LBS | OXYGEN SATURATION: 98 % | BODY MASS INDEX: 43.79 KG/M2 | SYSTOLIC BLOOD PRESSURE: 143 MMHG | DIASTOLIC BLOOD PRESSURE: 80 MMHG | RESPIRATION RATE: 16 BRPM

## 2023-03-30 DIAGNOSIS — E66.813 CLASS 3 SEVERE OBESITY DUE TO EXCESS CALORIES WITHOUT SERIOUS COMORBIDITY WITH BODY MASS INDEX (BMI) OF 40.0 TO 44.9 IN ADULT (H): Primary | ICD-10-CM

## 2023-03-30 DIAGNOSIS — K91.2 POSTSURGICAL MALABSORPTION: ICD-10-CM

## 2023-03-30 DIAGNOSIS — E66.01 CLASS 3 SEVERE OBESITY DUE TO EXCESS CALORIES WITHOUT SERIOUS COMORBIDITY WITH BODY MASS INDEX (BMI) OF 40.0 TO 44.9 IN ADULT (H): Primary | ICD-10-CM

## 2023-03-30 DIAGNOSIS — Z98.84 BARIATRIC SURGERY STATUS: ICD-10-CM

## 2023-03-30 PROCEDURE — 99205 OFFICE O/P NEW HI 60 MIN: CPT | Performed by: PHYSICIAN ASSISTANT

## 2023-03-30 RX ORDER — FAMOTIDINE 20 MG/1
20 TABLET, FILM COATED ORAL 2 TIMES DAILY PRN
Qty: 180 TABLET | Refills: 1 | Status: SHIPPED | OUTPATIENT
Start: 2023-03-30 | End: 2023-04-10

## 2023-03-30 RX ORDER — PYRIDOXINE HCL (VITAMIN B6) 100 MG
1 TABLET ORAL DAILY
COMMUNITY
End: 2023-03-30

## 2023-03-30 RX ORDER — SEMAGLUTIDE 0.5 MG/.5ML
0.5 INJECTION, SOLUTION SUBCUTANEOUS WEEKLY
Qty: 2 ML | Refills: 1 | Status: SHIPPED | OUTPATIENT
Start: 2023-03-30 | End: 2023-04-10

## 2023-03-30 RX ORDER — SEMAGLUTIDE 1 MG/.5ML
1 INJECTION, SOLUTION SUBCUTANEOUS WEEKLY
Qty: 2 ML | Refills: 1 | Status: SHIPPED | OUTPATIENT
Start: 2023-03-30 | End: 2023-04-10

## 2023-03-30 RX ORDER — SEMAGLUTIDE 0.25 MG/.5ML
0.25 INJECTION, SOLUTION SUBCUTANEOUS WEEKLY
Qty: 2 ML | Refills: 0 | Status: SHIPPED | OUTPATIENT
Start: 2023-03-30 | End: 2023-04-10

## 2023-03-30 NOTE — ASSESSMENT & PLAN NOTE
We discussed healthy habits to assist with weight loss. We discussed medication that may assist with weight loss. Wegovy was prescribed. Risks/ benefits and possible side effects were discussed and questions were answered. Written information was given. If not covered with start Naltrexone for cravings and possibly add Wellbutrin for energy/mood.

## 2023-03-30 NOTE — TELEPHONE ENCOUNTER
Prior Authorization Retail Medication Request    Medication/Dose: famotidine (PEPCID) 20 MG tablet  ICD code (if different than what is on RX):    Previously Tried and Failed:    Rationale:  Nausea/heathburn    Insurance Name:  [employer] out of [place]  Insurance ID:  [id number]      Pharmacy Information (if different than what is on RX)  Name:  [facility]  Phone:  [phone]

## 2023-03-30 NOTE — ASSESSMENT & PLAN NOTE
Continue taking recommended post-op vitamins.- Pt to review with dietician tomorrow at appt.   Labs ordered per protocol.

## 2023-03-30 NOTE — TELEPHONE ENCOUNTER
Prior Authorization Retail Medication Request    Medication/Dose: Semaglutide-Weight Management (WEGOVY) 0.25 MG/0.5ML pen  Semaglutide-Weight Management (WEGOVY) 0.5 MG/0.5ML pen  Semaglutide-Weight Management (WEGOVY) 1.0 MG/0.5ML pen  ICD code (if different than what is on RX):    Previously Tried and Failed: diet, exercise, bariatric surgery  Rationale:  Wt loss    Insurance Name:  [facility] out of state  Insurance ID:  [id number]      Pharmacy Information (if different than what is on RX)  Name:  [facility]  Phone:  [phone]

## 2023-03-30 NOTE — PATIENT INSTRUCTIONS
"To ensure the quality you may receive a patient satisfaction survey. The greatest compliment you can give is \"Likely to Recommend\"    Nice to talk with you today. Thank you for allowing me the privilege of caring for you. Below is the plan discussed.-  AMBER Hathaway      Plan:  Labs ordered. Call [phone] to schedule.  Continue your bariatric vitamins     Start Wegovy (semaglutide)   0.25 mg once weekly for 4 weeks,   if tolerating increase to 0.5 mg weekly for 4 weeks,   if tolerating increase to 1 mg weekly    May use famotidine 20 mg twice daily as needed for nausea/heartburn when starting the medication.       FOLLOW-UP:  Call [phone] to schedule next visit 3 months.      Bariatric Post Op Guidelines  General:    To avoid marginal ulcers avoid all forms of tobacco, alcohol in excess, caffeine, and NSAIDS     Exercise is key for weight loss and weight maintenance. Aim for 30-60 minutes of physical activity most days.  Include cardiovascular and strength training.    Continue lifelong vitamins supplementation and annual lab follow up.  All  patients should supplement with the following bariatric postoperative vitamins:  2 Complete multivitamins with minerals (at different times than calcium)  Vitamin D 5000 Int Units/125 mg daily   Calcium 600 mg twice daily or 500 mg three times daily   Vitamin B12: 500 mcg sl daily or 1000 mcg Inj monthly  B complex daily or Thiamine 100 mg weekly  1 Iron/Vit C. Daily for females who menstruate and/or as directed    The bariatric team should be aware and evaluate all GI symptoms which can be a sign of complications. Inability to tolerate textured solid food (chicken, steak, fish) may need to be evaluated by endoscopy.    There is a 10% increase of Alcohol Use Disorder in patients with bariatric surgery.   Most often occurring around 2 years post op.  Call if you feel alcohol is interfering in your daily life.  We can help.     Follow up annually lifelong. Obesity is a " chronic disease.  Weight gain can be expected. The goal of follow-up visits is to ensure adequate vitamin and protein absorption, evaluate food intake behavior, review exercise/activity level, and assist with weight regain.    Nutritional:  Eat 3 meals per day  (No snacks between meals.)  Do not skip meals.  This can cause overeating at the next meal and will prevent adequate protein and nutritional intake.    Aim for 60-80 grams of protein per day.  Always eat your protein first. This assists with optimal nutrition and helps you stay full longer.    Eat your protein first, and then follow with fiber.    Add fiber by including fruits, vegetables, whole grains, and beans.     Portions should be about 1 cup per meal. Use measuring cups to be accurate.  Continue to use saucer/salad plates, infant/toddler silverware to keep portion sizes small and take small bites.    Eat S-[id number]-Y to make each meal last 20-30 minutes. Always stop eating when satisfied.    Aim for 64 oz. of calorie-free fluids daily.    Avoid drinking 30 min before, during, and 30 min after meal    Avoid high sugar and high fat foods to prevent high calorie intake. This will reduce your rate of weight loss and can cause weight regain.   Check nutrition labels for less than 10 grams of sugar and less than 10 grams of fat per serving.       WEGOVY (semaglutide)    Wegovy (semaglutide) injection 2.4 mg is an injectable prescription medicine used for adults with obesity (BMI ?30) or overweight (excess weight) (BMI ?27) who also have weight-related medical problems to help them lose weight and keep the weight off.    1.  Start Wegovy (semaglutide) 0.25 mg once weekly for 4 weeks, then if tolerating increase to 0.5 mg weekly for 4 weeks, then if tolerating increase to 1 mg weekly for 4 weeks, then if tolerating increase to 1.7 mg weekly for 4 weeks, then if tolerating increase to 2.4 mg weekly thereafter.    -Each Wegovy pen is a once weekly single-dose  "prefilled pen with a pen injector already built within the pen. Discard the Wegovy pen after use in sharps container.     2. Storage: make sure that when you get the prescription that you store the prescription in the refrigerator until it is time to use the Wegovy pen.  Once it is time to use the Wegovy pen, you can keep the pen at room temperature and it is good for up to 28 days at room temperature.     3.  Potential common side effects: nausea, headache, diarrhea, stomach upset.  If these become unmanageable or concerning symptoms, please make sure to call or mychart.    Go to site: For a video on how to use the pen please go to:  https://www.[employer]/about-wegovy/how-to-use-the-wegovy-pen.html#itemTwo       For any questions or concerns please send a [employer] message to our team or call our weight management call center at [phone] during regular business hours. For questions during evenings or weekends your messages will be addressed during the next business day.  For emergencies please call 911 or seek immediate medical care.     Naltrexone    Naltrexone is a medication that is used most often to help people who are troubled by dependence on prescription pain killers or alcohol. It has also been found to help with weight loss. Although it's not currently FDA approved for weight loss, it has been used safely for a number of years to help people who are carrying extra weight.     Just how Naltrexone helps with weight loss has not been exactly determined.  It seems to work by quieting down brain signals related to strong food cravings. Many of our patients use the word \"addiction\" to describe their feelings and constant thoughts about food. It makes sense then to treat the feeling of dependence on food, outside of real hunger, with a medication designed to help with other sorts of dependence.     Our patients on Naltrexone find that they:    >feel less interest in food   >think less about food and eating " and have more time to think of other things   >find it easier to push the plate away   >have an easier time eating less    For some of our patients, these feelings are very immediate. Other patients, don't feel much of a change but find they've lost weight. Like all weight loss medications, Naltrexone works best when you help it work. This means:  1. Having less tempting high calorie (fattening) food around the house or office. (For people with strong cravings this is very important.)   2. Staying away from situations or people that may trigger your cravings .   3. Eating out only one time or less each week.  4. Eating your meals at a table with the TV or computer off.    Side-effects. Naltrexone is generally well tolerated. The main side-effect we see is  nausea or a woozy feeling. A small number of people feel quite ill. Most people have a mild reaction and some people have no reaction at all.  The good news is that this feeling does go away.     In order to avoid nausea, please start the medication with half a pill for the first few days. Go on to a full pill if you are feeling well.      If you  are nauseated on 1/2 a pill it is okay to cut back to 1/4 pill ( a very small amount). Take this for a couple of days and work your way back up to a 1/2 pill and then a whole pill. Taking the medication at night or with food  to start also may help prevent the feeling of nausea.       WARNING: This medication blocks the action of opioid type pain medications.    If you routinely take narcotic pain medication like Codeine, Oxycontin,Percocet,Morphine,Dilaudid or Methodone, do not take this until you have talked with weight management staff.   2.  If you are planning surgery you should stop Naltrexone 4 days prior to the surgery.   3.  If you have an injury that requires pain medication, make sure the health care staff knows you take Naltrexone.       For any questions/concerns contact [facility]  [phone]

## 2023-03-30 NOTE — PROGRESS NOTES
Bariatric Surgery-Establish Care Visit    RE: Jenae Miller  MR#: [id number]  : 1965  VISIT DATE: 3/30/2023    Dear Tonya Mtz,    I had the pleasure of seeing your patient, Jenae Miller, in my post-bariatric surgery assessment clinic today to establish care.      Assessment & Plan   Problem List Items Addressed This Visit     Bariatric surgery status     2015 Gastric Sleeve  July Janet López         Class 3 severe obesity due to excess calories without serious comorbidity with body mass index (BMI) of 40.0 to 44.9 in adult (H) - Primary     We discussed healthy habits to assist with weight loss. We discussed medication that may assist with weight loss. Wegovy was prescribed. Risks/ benefits and possible side effects were discussed and questions were answered. Written information was given. If not covered with start Naltrexone for cravings and possibly add Wellbutrin for energy/mood.                 Relevant Medications    Semaglutide-Weight Management (WEGOVY) 0.25 MG/0.5ML pen    Semaglutide-Weight Management (WEGOVY) 0.5 MG/0.5ML pen    Semaglutide-Weight Management (WEGOVY) 1 MG/0.5ML pen    famotidine (PEPCID) 20 MG tablet    Other Relevant Orders    Hepatic panel    Postsurgical malabsorption     Continue taking recommended post-op vitamins.- Pt to review with dietician tomorrow at appt.   Labs ordered per protocol.           Relevant Orders    CBC with platelets    Vitamin B12    Vitamin D Screen    Parathyroid Hormone Intact    Iron and Iron Binding Capacity    Ferritin    Vitamin A    NUTRITION REFERRAL    Hemoglobin A1c        PATIENT INSTRUCTIONS:  Labs ordered. Call [phone] to schedule.  Continue your bariatric vitamins     Start Wegovy (semaglutide)   0.25 mg once weekly for 4 weeks,   if tolerating increase to 0.5 mg weekly for 4 weeks,   if tolerating increase to 1 mg weekly    May use famotidine 20 mg twice daily as needed for nausea/heartburn when  starting the medication.       FOLLOW-UP:  Call [phone] to schedule next visit 3 months.      62 minutes spent on the date of the encounter doing chart review, history and exam, result review, counseling, developing plan of care, documentation, and further activities as noted        CHIEF COMPLAINT: Post-bariatric surgery follow-up/establish care    HISTORY OF PRESENT ILLNESS:  Questions Regarding Prior Weight Loss Surgery Reviewed With Patient 3/27/2023   I had the following weight loss procedure: Sleeve Gastrectomy   What year was your surgery? 2015   How has your weight changed since your last visit? I have gained weight   Do you currently have any of the following: Sleep Apnea, Hypertension (high blood pressure)?   Do you have any concerns today? I d like to lose the weight I ve gained back since surgery   RITU: Recently re diagnosed with RITU.  She is hoping to lose weight so she does not need to go back  on CPAP or get a dental device.  Has referral to dentist.    HTN: Now back on HTN medications since December.    Pt who had gastric sleeve surgeryn Feels she gained her weight back because of there anxiety and a combination of the pandemic.  Problems are less hunger and more cravings.  This was how it was pre- bariatric too.      She has low energy.  Is hopin medication may help with energy.     BARIATRIC SURGERY HX:    Surgery:  Gastric Sleeve  DOS:   06/16/2015  Surgeon: Dr. Selina MD    Facility:  [facility]  Initial Weight:  291.5 lb  Lowest weight post surgery:  192 lb  Complications: none    Weight History:     3/27/2023   What is your highest lifetime weight? 292   What is your lowest weight since surgery? (In pounds) 192     Initial Weight (lbs): 276 lbs  Weight: 279 lb (126.6 kg)  Last Visits Weight: 276 lb (125.2 kg)  Cumulative weight loss (lbs): -3  Weight Loss Percentage: -1.09%     Wt Readings from Last 10 Encounters:   03/30/23 279 lb (126.6 kg)   12/08/22 276 lb (125.2 kg)    10/15/21 253 lb (114.8 kg)   08/10/20 243 lb (110.2 kg)   09/13/19 238 lb 8 oz (108.2 kg)   08/13/19 234 lb 8 oz (106.4 kg)   08/01/19 238 lb (108 kg)   05/25/19 235 lb (106.6 kg)   01/27/19 231 lb (104.8 kg)   08/06/18 230 lb (104.3 kg)       Patient is taking the following bariatric postoperative vitamins:  1 Centrum over 50   5000 Int Units of Vitamin D daily   2-500 mg of calcium daily at the same time as vit D  Unknown dose mcg of Vitamin B12 sl       Questions Regarding Co-Morbidities and Health Concerns Reviewed With Patient 3/27/2023   Pre-diabetes: Never   Diabetes II: Never   High Blood Pressure: Stayed the same   High cholesterol: Never   Heartburn/Reflux: Never   Sleep apnea: Stayed the same   PCOS: Never   Back pain: Stayed the same   Joint pain: Stayed the same   Lower leg swelling: Stayed the same       Eating Habits 3/27/2023   How many meals do you eat per day? 3   Do you snack between meals? Yes   How much food are you eating at each meal? 1/2 cup to 1 cup   Are you able to separate your meals and liquids by at least 30 minutes? Sometimes   Are you able to avoid liquid calories? Sometimes     Diet Recall:   7:30a Wake up  9:00  Greek yogurt/blueberries or Rubén Tae Egg Bowl  12:30  Chicken Kate Marsala with Naan  3-4   String cheese/apple or beef stick  6:00  Korean Beef Meal Made from home  7-8  Salty then sweet the   9-10     Salty and sweet again a few hours later  Bedtime Midnight-1:30 PM       Exercise Questions Reviewed With Patient 3/27/2023   How often do you exercise? Daily   What is the duration of your exercise (in minutes)? 60+ Minutes   What types of exercise do you do? walking, gym membership, weightlifting   What keeps you from being more active? Too tired   Likes exercise.  Walks dog 20-30 minutes 3 times a week. Is starting with a  next week.      Social History:      3/27/2023   How much alcohol? 5 glasses of wine per week- With dinner    Habits:  Denies NSAIDS.  1 Cup  of coffee daily. Last soda was yesterday.     Works: FT as [employer] from home.  Lies alone.        Medications:  Current Outpatient Medications   Medication     albuterol (PROAIR HFA/PROVENTIL HFA/VENTOLIN HFA) 108 (90 Base) MCG/ACT inhaler     CALCIUM CITRATE PO     CENTRUM OR TABS     cholecalciferol (VITAMIN D) 1000 UNIT tablet     Cholecalciferol 100 MCG (4000 UT) CAPS     cod liver oil CAPS capsule     Cyanocobalamin (VITAMIN B 12 PO)     CycloSPORINE (RESTASIS OP)     famotidine (PEPCID) 20 MG tablet     losartan-hydrochlorothiazide (HYZAAR) 50-12.5 MG tablet     Semaglutide-Weight Management (WEGOVY) 0.25 MG/0.5ML pen     Semaglutide-Weight Management (WEGOVY) 0.5 MG/0.5ML pen     Semaglutide-Weight Management (WEGOVY) 1 MG/0.5ML pen     vitamin C (ASCORBIC ACID) 100 MG tablet     No current facility-administered medications for this visit.     No flowsheet data found.    ROS:        Female Only:   BAR RBS ROS - FEMALE ONLY 3/27/2023   Female only: Post-menopausal       LABS/IMAGING/MEDICAL RECORDS REVIEW:   Hemoglobin   Date Value Ref Range Status   11/23/2015 13.3 11.7 - 15.7 g/dL Final     ROS:    HEENT  H/O glaucoma:  no  Cardiovascular  CAD:   no  Palpitations:   no  HTN:    yes  Gastrointestinal  GERD:   no  Constipation:   no  Gastroparesis:  no  Liver Dz:   no  H/O Pancreatitis:  no  H/O Gallbladder Dz: no  Psychiatric  Moods Stable:  yes  Anxiety:   Yes, Seen a therapist monthly for the past few years.    Depression:  no  Bipolar:  no  H/O ETOH/Drug Use no  H/O eating disorder: no  Endocrine  PMH/FMH of MTC or MEN2:  no  Neurologic:  H/O seizures:   no  Headaches:  no  Memory Impairment:  no    H/O kidney stones:  no  Kidney disease:  No  Denies Stress Incontinence   Current birth control:  Post menopausal.    Skin  No rashes or irritation.     BP Readings from Last 6 Encounters:   03/30/23 (!) 143/80   12/30/22 126/80   12/08/22 (!) 144/85   10/15/21 130/70   08/10/20 (!) 141/85   09/13/19 130/78  "      Pulse Readings from Last 6 Encounters:   03/30/23 70   12/30/22 66   12/08/22 95   10/15/21 69   08/10/20 63   09/13/19 74       PHYSICAL EXAMINATION:  BP (!) 143/80   Pulse 70   Resp 16   Ht 5' 7\" (1.702 m)   Wt 279 lb (126.6 kg)   SpO2 98%   BMI 43.70 kg/m    GENERAL: Healthy, alert and no distress  EYES: Eyes grossly normal to inspection.  No discharge or erythema, or obvious scleral/conjunctival abnormalities.  RESP: No audible wheeze, cough, or visible cyanosis.  No visible retractions or increased work of breathing.    SKIN: Visible skin clear. No significant rash, abnormal pigmentation or lesions.  NEURO: Cranial nerves grossly intact.  Mentation and speech appropriate for age.  PSYCH: Mentation appears normal, affect normal/bright, judgement and insight intact, normal speech and appearance well-groomed.    COUNSELING PROVIDED:  We reviewed the important post op bariatric recommendations:  eating 3 meals daily  eating protein first, getting >60gm protein daily  eating slowly, chewing food well  avoiding/limiting calorie containing beverages  avoiding fluids 30 minutes before, during, and after meals  limiting restaurant or cafeteria eating to twice a week or less  Pt reminded to avoid marginal ulcers she should avoid tobacco at all, alcohol in excess, caffeine, and NSAIDS (unless indicated for cardioprotection or othewise and opposed by a PPI).  Pt encouraged to establish and maintain a consistent physical activity routine, 6-8 hours of restorative sleep each night and optimal stress management.  Pt counseled on the importance of life long vitamin supplementation and life long follow up.    Sincerely,    Mag Soto PA-C      "

## 2023-03-31 ENCOUNTER — VIRTUAL VISIT (OUTPATIENT)
Dept: SURGERY | Facility: CLINIC | Age: 58
End: 2023-03-31
Payer: COMMERCIAL

## 2023-03-31 ENCOUNTER — TELEPHONE (OUTPATIENT)
Dept: SURGERY | Facility: CLINIC | Age: 58
End: 2023-03-31

## 2023-03-31 VITALS — BODY MASS INDEX: 43.79 KG/M2 | HEIGHT: 67 IN | WEIGHT: 279 LBS

## 2023-03-31 DIAGNOSIS — E66.01 CLASS 3 SEVERE OBESITY DUE TO EXCESS CALORIES WITHOUT SERIOUS COMORBIDITY WITH BODY MASS INDEX (BMI) OF 40.0 TO 44.9 IN ADULT (H): Primary | ICD-10-CM

## 2023-03-31 DIAGNOSIS — E66.813 CLASS 3 SEVERE OBESITY DUE TO EXCESS CALORIES WITHOUT SERIOUS COMORBIDITY WITH BODY MASS INDEX (BMI) OF 40.0 TO 44.9 IN ADULT (H): Primary | ICD-10-CM

## 2023-03-31 DIAGNOSIS — Z98.84 BARIATRIC SURGERY STATUS: ICD-10-CM

## 2023-03-31 PROCEDURE — 97802 MEDICAL NUTRITION INDIV IN: CPT | Mod: VID

## 2023-03-31 NOTE — PATIENT INSTRUCTIONS
"Emory Holm!    It was great chatting with you today! Here are some links to the information we discussed:         Vitamins and Minerals  [email or web address]     Diet Guidelines:  [email or web address]        Here are the goals we discussed for this first month:    1. Take all vitamins/minerals per guidelines:  - Multivitamin: Switch to Centrum Adult and increase to 2 tablets (can be taken at the same time)  - Calcium: 600mg 2x/day OR 500mg 3x/day. Take at least 2 hours apart from your multivitamin  - Vitamin D: Continue 5000 international unit(s) per day  - Vitamin B12: Verify dose. Aim for 500mcg/day or 5000mcg/week, sublingual.  - Vitamin B1 (Thiamin): Start 100mg 1x/week    - The easiest \"schedule\" for supplements is to take your Calcium with meals, and take everything else at bedtime    - An alternative route would be to take a bariatric specialty vitamin. If you go this route, I've listed the products we recommend, as well as your other supplement needs:    BA Ultra Solo  Unjury Opurity  Bariatric Pal - One a Day  ProCare (select the product with 18 mg of iron)    Multivitamin w/minerals - take one dose per day  Calcium - (as above)  Vitamin B12 -  (as above)        2. Aim for 1 cup of food containing 3 food groups  - 1/2c protein + 1/4c vegetable or fruit + 1/4c fruit or starch  - I'll mail you a sample meal plan    3. Replace snacks with milk or protein drinks  - Aim to have 1 protein shake per day OR 2 cups of low-fat milk    4. Beverage Guidelines:  - Avoid drinking/sipping while eating, as well as for 30 minutes after completion of the meal  - Avoid carbonation  - Limit caffeine to 1 cup of coffee per day    5. Practice \"non-food alternatives to psychological hunger\"  - Examples: crossword puzzles, games, puzzles, reading, arts/crafts/hobbies, adult coloring books, journaling, meditation, etc             Your next visit can be scheduled via the call center at  and should be in " 1-2 months. Have a great weekend and let us know if any questions/concerns pop up!     Janie Dean, LIZZY, LD?  Clinical Dietitian

## 2023-03-31 NOTE — TELEPHONE ENCOUNTER
Pt is looking into getting a dental appliance made instead of using a cpap, and she would like to know where we refer patients to see a dental apnea specialist. Requested [employer] message or call. Detailed voicemail ok.    [phone]

## 2023-03-31 NOTE — PROGRESS NOTES
"Alta is a 57 year old who is being evaluated via a billable video visit.      How would you like to obtain your AVS? MyChart  If the video visit is dropped, the invitation should be resent by: Text to cell phone: [phone]  Will anyone else be joining your video visit? No            Video-Visit Details    Type of service:  Video Visit     Originating Location (pt. Location): Home    Distant Location (provider location):  Off-site  Platform used for Video Visit: [facility]    NUTRITION POST OP APPOINTMENT  DATE OF VISIT: March 31, 2023    Jenae Miller  1965  female  [id number]  57 year old     ASSESSMENT:    REASON FOR VISIT:  Jenae is a 57 year old year old female presents today for 8 year PO nutrition follow-up appointment. Patient is accompanied by self.    DIAGNOSIS:  Status post gastric sleeve surgery.  Obesity Obesity Grade III BMI >40     ANTHROPOMETRICS:  Initial Weight: 276 lbs    Height: 67\"    Current Weight: 279 lbs     BMI: 43.7 kg/m2.     VITAMINS AND MINERALS:  1 Multivitamin w/Minerals (Centrum 50+; does not meet bariatric needs)  5000 international unit(s) Vitamin D  2-500 mg Calcium (separate from MVT)  [pt unsure of dose] mcg of Vitamin B12 - sublingual  No iron needed; post-menopausal    Recommended:  - Switch to adult Centrum and take double dose  - Calcium: 500mg TID  - Continue Vitamin D  - Verify Vitamin B12 dose  - Start 100mg Thiamin 1x/week    NUTRITION HISTORY:  Breakfast: [wakes at 7:30am, eats at 9] Greek yogurt w/berries  Rubén Tae breakfast bowl  cereal  omelette on weekends  Lunch: [12:30pm] chicken tiki masala w/naan  hamburger (1/2)  corn dog  salad (greens, veggies, nuts)  ramen w/2 eggs (1/2 portion) + kimchi   Supper: [6pm; bed at 12-1:30am] frozen salmon  frozen hamburgers  (similar to lunch)  pot stickers  Snacks: [afternoon] string cheese, apple, beef sticks  [evenings] grazes - salty then sweet  Fluids consumed: water, Stir/Ana/Hint water, soda (diet " or regular; 1 can every 1-2 days; had last can 2 days ago and plans not to buy anymore), coffee/specialty coffees ([place]), coffee (w/creamer), milk (8oz; skim or Fair Life)  Consuming liquid calories: Yes  Protein intake: 60 grams/day  Tolerate regular texture food: Yes  Portion size: ~1 cup  Take 20-30 minutes to consume each meal: Yes   Eat protein foods first: No  Fluids and meals separate by at least 30 minutes: Sometimes  Chew foods thoroughly: Yes  Tolerating diet: Yes  Drinking high protein supplements: No/occasional milk   Consuming snacks per day: 1 throughout day, grazing in the evenings  Additional Information: Pt 10y s/p lap sleeve gastrectomy (Dr. Janet July, 2015). She is here today to establish care and discuss the weight regain she's had throughout the pandemic. Notes that working from home and having constant access to food has been challenging. She is struggling with grazing/cravings similar to the habits she had pre-op. Anxiety adding to these behaviors. Her RITU has also returned and she has some baseline fatigue. Reviewed all lifestyle guidelines and portion/snacking needs. Discussed non-food alternatives to psychological hunger Reviewed vitamins. Pt has been prescribed Wegovy however needs prior auth for insurance coverage.         PHYSICAL ACTIVITY:  Type: walking/walking dog, gym membership, strength training  Frequency (days per week): 7  Duration (min): 60    DIAGNOSIS:      Current Nutrition Diagnosis: Altered gastrointestinal function related to alteration in gastrointestinal structure as evidenced by history of gastric sleeve surgery.    INTERVENTION:   Nutrition Prescription: Eat 3 meals a day at regular intervals. Consume 60-90 grams of protein daily. Follow post-surgical vitamin and mineral protocol.  Assessed learning needs and learning preferences.    GOALS:  Take all vitamins/minerals per guidelines  Aim for 1 cup of food with 3 food groups  Replace snacks with milk or protein  drinks  Practice non-food alternatives to snacking in the evenings  Separate fluids and meals by 30 minutes  Avoid carbonation and limit caffeine    Follow-Up:   Recommend q1-2m follow up visits to assist with lifestyle changes or per insurance.  Implementation: Discussed progress toward previous goals; reinforced importance of following bariatric lifestyle changes.    NUTRITION MONITORING AND EVALUATION:  Anticipated compliance: good  Verbalized good understanding.    Follow up: Patient to follow up in 1-2 months.    TIME SPENT WITH PATIENT:  51 minutes        Janie Dean RD, LD  Clinical Dietitian

## 2023-04-02 NOTE — TELEPHONE ENCOUNTER
Central Prior Authorization Team   Phone: [phone]      PA Initiation    Medication: Semaglutide-Weight Management (WEGOVY) 0.25 MG/0.5ML pen-PA initiated    Insurance Company: [employer] ([facility]) - Phone [phone] Fax [phone]  Pharmacy Filling the Rx: [id number] [place], [place] - [address]  Filling Pharmacy Phone: [phone]  Filling Pharmacy Fax:    Start Date: 4/2/2023

## 2023-04-03 NOTE — TELEPHONE ENCOUNTER
PRIOR AUTHORIZATION DENIED    Medication: famotidine (PEPCID) 20 MG tablet-DENIED    Denial Date: 4/3/2023    Denial Rational:          Appeal Information: N/A

## 2023-04-03 NOTE — TELEPHONE ENCOUNTER
Central Prior Authorization Team   Phone: [phone]      PA Initiation    Medication: famotidine (PEPCID) 20 MG tablet  Insurance Company: [employer] ([facility]) - Phone [phone] Fax [phone]  Pharmacy Filling the Rx: [id number] [place], [place] - [address]  Filling Pharmacy Phone: [phone]  Filling Pharmacy Fax:    Start Date: 4/3/2023    BIN: [id number]  PCN: [id number]   GRP: [id number]  ID: [id number]

## 2023-04-04 ENCOUNTER — LAB (OUTPATIENT)
Dept: LAB | Facility: CLINIC | Age: 58
End: 2023-04-04
Payer: COMMERCIAL

## 2023-04-04 DIAGNOSIS — K91.2 POSTSURGICAL MALABSORPTION: ICD-10-CM

## 2023-04-04 DIAGNOSIS — E66.813 CLASS 3 SEVERE OBESITY DUE TO EXCESS CALORIES WITHOUT SERIOUS COMORBIDITY WITH BODY MASS INDEX (BMI) OF 40.0 TO 44.9 IN ADULT (H): ICD-10-CM

## 2023-04-04 DIAGNOSIS — E66.01 CLASS 3 SEVERE OBESITY DUE TO EXCESS CALORIES WITHOUT SERIOUS COMORBIDITY WITH BODY MASS INDEX (BMI) OF 40.0 TO 44.9 IN ADULT (H): ICD-10-CM

## 2023-04-04 LAB
ERYTHROCYTE [DISTWIDTH] IN BLOOD BY AUTOMATED COUNT: 13.2 % (ref 10–15)
HBA1C MFR BLD: 5.4 % (ref 0–5.6)
HCT VFR BLD AUTO: 42.7 % (ref 35–47)
HGB BLD-MCNC: 14 G/DL (ref 11.7–15.7)
MCH RBC QN AUTO: 30.9 PG (ref 26.5–33)
MCHC RBC AUTO-ENTMCNC: 32.8 G/DL (ref 31.5–36.5)
MCV RBC AUTO: 94 FL (ref 78–100)
PLATELET # BLD AUTO: 306 10E3/UL (ref 150–450)
PTH-INTACT SERPL-MCNC: 24 PG/ML (ref 15–65)
RBC # BLD AUTO: 4.53 10E6/UL (ref 3.8–5.2)
VIT B12 SERPL-MCNC: 1063 PG/ML (ref 232–1245)
WBC # BLD AUTO: 7.9 10E3/UL (ref 4–11)

## 2023-04-04 PROCEDURE — 80076 HEPATIC FUNCTION PANEL: CPT

## 2023-04-04 PROCEDURE — 36415 COLL VENOUS BLD VENIPUNCTURE: CPT

## 2023-04-04 PROCEDURE — 83036 HEMOGLOBIN GLYCOSYLATED A1C: CPT

## 2023-04-04 PROCEDURE — 82306 VITAMIN D 25 HYDROXY: CPT

## 2023-04-04 PROCEDURE — 83540 ASSAY OF IRON: CPT

## 2023-04-04 PROCEDURE — 99000 SPECIMEN HANDLING OFFICE-LAB: CPT

## 2023-04-04 PROCEDURE — 82607 VITAMIN B-12: CPT

## 2023-04-04 PROCEDURE — 85027 COMPLETE CBC AUTOMATED: CPT

## 2023-04-04 PROCEDURE — 83970 ASSAY OF PARATHORMONE: CPT

## 2023-04-04 PROCEDURE — 83550 IRON BINDING TEST: CPT

## 2023-04-04 PROCEDURE — 84590 ASSAY OF VITAMIN A: CPT | Mod: 90

## 2023-04-04 PROCEDURE — 82728 ASSAY OF FERRITIN: CPT

## 2023-04-05 LAB
ALBUMIN SERPL-MCNC: 3.9 G/DL (ref 3.4–5)
ALP SERPL-CCNC: 117 U/L (ref 40–150)
ALT SERPL W P-5'-P-CCNC: 31 U/L (ref 0–50)
AST SERPL W P-5'-P-CCNC: 28 U/L (ref 0–45)
BILIRUB DIRECT SERPL-MCNC: 0.1 MG/DL (ref 0–0.2)
BILIRUB SERPL-MCNC: 0.6 MG/DL (ref 0.2–1.3)
DEPRECATED CALCIDIOL+CALCIFEROL SERPL-MC: 96 UG/L (ref 20–75)
FERRITIN SERPL-MCNC: 24 NG/ML (ref 8–252)
IRON SATN MFR SERPL: 22 % (ref 15–46)
IRON SERPL-MCNC: 86 UG/DL (ref 35–180)
PROT SERPL-MCNC: 7.1 G/DL (ref 6.8–8.8)
TIBC SERPL-MCNC: 384 UG/DL (ref 240–430)

## 2023-04-05 NOTE — TELEPHONE ENCOUNTER
PRIOR AUTHORIZATION DENIED    Medication: Semaglutide-Weight Management (WEGOVY) 0.25 MG/0.5ML pen-PA denied    Denial Date: 4/2/2023    Denial Rational: This is excluded from coverage

## 2023-04-07 LAB
ANNOTATION COMMENT IMP: NORMAL
RETINYL PALMITATE SERPL-MCNC: 0.05 MG/L
VIT A SERPL-MCNC: 0.65 MG/L

## 2023-04-10 ENCOUNTER — MYC MEDICAL ADVICE (OUTPATIENT)
Dept: SURGERY | Facility: CLINIC | Age: 58
End: 2023-04-10
Payer: COMMERCIAL

## 2023-04-10 RX ORDER — BUPROPION HYDROCHLORIDE 100 MG/1
100 TABLET, EXTENDED RELEASE ORAL 2 TIMES DAILY
Qty: 180 TABLET | Refills: 1 | Status: SHIPPED | OUTPATIENT
Start: 2023-04-10 | End: 2023-07-10

## 2023-04-10 RX ORDER — NALTREXONE HYDROCHLORIDE 50 MG/1
TABLET, FILM COATED ORAL
Qty: 90 TABLET | Refills: 1 | Status: SHIPPED | OUTPATIENT
Start: 2023-04-10 | End: 2023-10-20

## 2023-04-10 NOTE — TELEPHONE ENCOUNTER
Bupropion and Naltrexone    We are starting  two medications, Naltrexone and Bupropione (Wellbutrin). The Bupropion helps lessen appetite and the Naltrexone works by blocking certain receptors in the brain and curbing cravings.      For some of our patients the medication works right away. Other patients don't feel much of a change but find they've lost weight. Like all weight loss medications, Contrave works best when you help it work. This means:  1. Having less tempting high calorie (fattening) food around the house or office. (For people with strong cravings this is very important.)   2. Staying away from situations or people that may trigger your cravings .   3. Eating out only one time or less each week.  4. Eating your meals at a table with the TV or computer off.    Instructions:  Bupropion: Take 1 tablet in the morning for the first week, if tolerating then increase to one tablet in the morning and one tablet in the evening.  (If you have trouble with sleep you can take your second dose in the afternoon instead)    Naltrexone: Start with 1/2 tab 1-2 hours prior to the time you have the most trouble with cravings or extra hunger. If you are doing well after a week, you may switch to 1/2 tablet twice daily.     Side-effects of naltrexone:The main side-effect we see is nausea or a mild headache and usually goes away with time.     Side-effects of bupropion : The most common side effect include: nausea; constipation; headache;  trouble sleeping; and dry mouth.       WARNING: Naltrexone blocks the action of opioid type pain medications. If you routinely take any medication like Codeine, Oxycontin, Percocet, Morphine, Dilaudid or Methodone, do not take this until you have talked with weight management staff.     FYI- If you are planning on having an elective surgery, you should start titrating yourself off Contrave 4 weeks prior to surgery. This would entail decreasing the same way you started the medication, by  taking one tablet less per week for 4 weeks, until you are able to stop the medication. If you need to stop it quicker, you can take 1 tablet in the morning and 1 tablet in the evening for 2 weeks, and then stop the medication. Please don't hesitate to call if you have any questions regarding this.    Call the nurse at [phone] if you have any questions or concerns. (Do not stop taking it if you don't think it's working. For some people it works without them knowing it.)       In order to get refills of this or any medication we prescribe you must be seen in the medical weight mgmt clinic every 2-4 months.

## 2023-04-10 NOTE — TELEPHONE ENCOUNTER
Called pt and discussed naltrexone and bupropion medication information sent in  msg today.  Ophelia Francis MS, RD, RN

## 2023-04-12 ENCOUNTER — MEDICAL CORRESPONDENCE (OUTPATIENT)
Dept: HEALTH INFORMATION MANAGEMENT | Facility: CLINIC | Age: 58
End: 2023-04-12

## 2023-04-22 ENCOUNTER — HEALTH MAINTENANCE LETTER (OUTPATIENT)
Age: 58
End: 2023-04-22

## 2023-04-28 ENCOUNTER — VIRTUAL VISIT (OUTPATIENT)
Dept: SURGERY | Facility: CLINIC | Age: 58
End: 2023-04-28
Payer: COMMERCIAL

## 2023-04-28 VITALS — BODY MASS INDEX: 42.22 KG/M2 | HEIGHT: 67 IN | WEIGHT: 269 LBS

## 2023-04-28 DIAGNOSIS — Z98.84 BARIATRIC SURGERY STATUS: ICD-10-CM

## 2023-04-28 DIAGNOSIS — E66.813 CLASS 3 SEVERE OBESITY DUE TO EXCESS CALORIES WITHOUT SERIOUS COMORBIDITY WITH BODY MASS INDEX (BMI) OF 40.0 TO 44.9 IN ADULT (H): Primary | ICD-10-CM

## 2023-04-28 DIAGNOSIS — E66.01 CLASS 3 SEVERE OBESITY DUE TO EXCESS CALORIES WITHOUT SERIOUS COMORBIDITY WITH BODY MASS INDEX (BMI) OF 40.0 TO 44.9 IN ADULT (H): Primary | ICD-10-CM

## 2023-04-28 PROCEDURE — 97803 MED NUTRITION INDIV SUBSEQ: CPT | Mod: VID

## 2023-04-28 NOTE — PROGRESS NOTES
"Alta is a 57 year old who is being evaluated via a billable video visit.      How would you like to obtain your AVS? MyChart  If the video visit is dropped, the invitation should be resent by: Text to cell phone: [phone]  Will anyone else be joining your video visit? No            Video-Visit Details    Type of service:  Video Visit     Originating Location (pt. Location): Home    Distant Location (provider location):  Off-site  Platform used for Video Visit: [facility]      NUTRITION POST OP APPOINTMENT  DATE OF VISIT: April 28, 2023    Jenae Miller  1965  female  [id number]  57 year old     ASSESSMENT:    REASON FOR VISIT:  Jenae is a 57 year old year old female presents today for 8 year PO nutrition follow-up appointment. Patient is accompanied by self.    DIAGNOSIS:  Status post gastric sleeve surgery.  Obesity Obesity Grade III BMI >40     ANTHROPOMETRICS:  Initial Weight: 276 lbs  Height: 67\"   Current Weight: 269 lbs 0 oz    BMI: Body mass index is 42.13 kg/m .    VITAMINS AND MINERALS:  1 Multivitamin with Minerals (lunch - Centrum 50+; does not meet bariatric needs; finishing up supply before switching)  650 mg Calcium With Vitamin D (AM)  Vitamin D discontinued d/t elevated labs (96 micrograms/L on 4/4/2023)  1000 mcg Vitamin B-12 sublingual - MWF  No iron needed; post-menopausal    WEIGHT LOSS MEDICATIONS:  Bupropion  Naltrexone    NUTRITION HISTORY:  Breakfast: egg bites (1; eggs, grace, seasoning, cheese)  Lunch: leftovers  soup + sandwich  ramen + kimchi   frozen meal   Supper: salmon + sweet potatoes  restaurant - chicken + shrimp + grilled onions and mushrooms  pot stickers   Snacks: [afternoons + evenings] string cheese + apple  beef stick/jerky  vegetables + hummus/dip  ice cream or frozen yogurt  yogurt covered pretzels, chocolate caramels  Fluids consumed: water/enhanced water (72oz), coffee (8oz; creamer), milk (8oz; skim Fair Life)  Consuming liquid calories: Yes  Protein " intake: 40-50 grams/day  Portion size: 1 cup or more  Take 20-30 minutes to consume each meal: Yes   Eat protein foods first: Yes  Fluids and meals separate by at least 30 minutes: Yes/Usually  Chew foods thoroughly: Yes  Tolerating diet: Yes  Drinking high protein supplements: No  Consuming snacks per day: 1-2  Additional Information: Pt started bupropion and naltrexone and pleased with weight loss. Reviewed vitamin/mineral needs. Recommended increase Fair Life milk. Recommended measure food to stay on track with portions.         PHYSICAL ACTIVITY:  Type: personal training, walking dog  Frequency (days per week): 7  Duration (min): 30-60/1-2 miles per walk    DIAGNOSIS:  Previous Nutrition Diagnosis: Altered gastrointestinal function related to alteration in gastrointestinal structure as evidenced by history of gastric sleeve surgery.- no change    Previous goals:  Take all vitamins/minerals per guidelines - partially met  Aim for 1 cup of food with 3 food groups - not met/improving  Replace snacks with milk or protein drinks - not met  Practice non-food alternatives to snacking in the evenings - ongoing  Separate fluids and meals by 30 minutes - met  Avoid carbonation and limit caffeine - met    Current Nutrition Diagnosis: Altered gastrointestinal function related to alteration in gastrointestinal structure as evidenced by history of gastric sleeve surgery.    INTERVENTION:   Nutrition Prescription: Eat 3 meals a day at regular intervals. Consume 60-90 grams of protein daily. Follow post-surgical vitamin and mineral protocol.  Assessed learning needs and learning preferences.    GOALS:  Take all vitamins/minerals pr guidelines  Aim for 1 cup of food with 3 food groups  Replace snacks with Fair Life milk       Follow-Up:   Recommend q2-3m follow up visits to assist with lifestyle changes or per insurance.  Implementation: Discussed progress toward previous goals; reinforced importance of following bariatric  lifestyle changes.    NUTRITION MONITORING AND EVALUATION:  Anticipated compliance: fair-good  Verbalized fair-good understanding.    Follow up: Patient to follow up in 2-3 months.    TIME SPENT WITH PATIENT:  33 minutes      Janie Dean RD, LD  Clinical Dietitian

## 2023-04-28 NOTE — PATIENT INSTRUCTIONS
Emory Holm!      It was great chatting with you again today! Here's a summary of the goals we discussed:    1. Vitamins/Minerals:  - Multivitamin: Switch to adult Centrum. Increase to a double dose. Continue to take at lunchtime.   - Calcium: Increase to 2x/day. Morning and dinner/evening would work best.  - Vitamin D: Continue to hold until we recheck labs  - Vitamin B12: Continue with 1000mcg (sublingual) 3x/week  - Vitamin B1/Thiamin: Start 100mg 1x/week  - Hair/Skin/Nails supplement: Discontinue this. Switch to collagen and/or biotin only.    2. Aim for 1 cup of food containing 3 food groups  - Measure occasionally to stay on track  - Ratios: 1/2c protein (2oz of meat) + 1/4c vegetables + 1/4c fruit or starch    3. Replace snacks with milk or protein drinks  - Have 1 protein shake or 2 glasses of Fair Life milk per day to ensure you are getting at least 60g of protein each day  - Use these as a snack replacement, rather than solid food    4. Ongoing goals:  - Eat slowly and chew well  - Separate fluids and meals by 30 minutes  - Avoid carbonation, limit caffeine  - Eat balanced meals at regular intervals/avoid skipping meals  - Avoid snacking/replace snacks with milk or protein drinks        Plan on following up in 2-3 months. This can be scheduled via our call center at . Reach out sooner with any questions or concerns. Have a great day and keep up the good work!      Janie Dean, LIZZY, LD?  Clinical Dietitian

## 2023-05-22 ENCOUNTER — OFFICE VISIT (OUTPATIENT)
Dept: INTERNAL MEDICINE | Facility: CLINIC | Age: 58
End: 2023-05-22
Payer: COMMERCIAL

## 2023-05-22 VITALS
OXYGEN SATURATION: 97 % | SYSTOLIC BLOOD PRESSURE: 133 MMHG | BODY MASS INDEX: 42.22 KG/M2 | HEART RATE: 63 BPM | HEIGHT: 67 IN | TEMPERATURE: 98.3 F | DIASTOLIC BLOOD PRESSURE: 76 MMHG | WEIGHT: 269 LBS

## 2023-05-22 DIAGNOSIS — G47.33 OSA (OBSTRUCTIVE SLEEP APNEA): ICD-10-CM

## 2023-05-22 DIAGNOSIS — Z98.84 BARIATRIC SURGERY STATUS: ICD-10-CM

## 2023-05-22 DIAGNOSIS — B07.0 PLANTAR WARTS: Primary | ICD-10-CM

## 2023-05-22 DIAGNOSIS — M25.511 RIGHT SHOULDER PAIN, UNSPECIFIED CHRONICITY: ICD-10-CM

## 2023-05-22 DIAGNOSIS — Z90.13 STATUS POST MASTECTOMY, BILATERAL: ICD-10-CM

## 2023-05-22 DIAGNOSIS — E66.01 CLASS 3 SEVERE OBESITY DUE TO EXCESS CALORIES WITHOUT SERIOUS COMORBIDITY WITH BODY MASS INDEX (BMI) OF 40.0 TO 44.9 IN ADULT (H): ICD-10-CM

## 2023-05-22 DIAGNOSIS — E66.813 CLASS 3 SEVERE OBESITY DUE TO EXCESS CALORIES WITHOUT SERIOUS COMORBIDITY WITH BODY MASS INDEX (BMI) OF 40.0 TO 44.9 IN ADULT (H): ICD-10-CM

## 2023-05-22 PROCEDURE — 17110 DESTRUCTION B9 LES UP TO 14: CPT | Performed by: INTERNAL MEDICINE

## 2023-05-22 PROCEDURE — 99213 OFFICE O/P EST LOW 20 MIN: CPT | Mod: 25 | Performed by: INTERNAL MEDICINE

## 2023-05-22 RX ORDER — LANOLIN ALCOHOL/MO/W.PET/CERES
100 CREAM (GRAM) TOPICAL WEEKLY
COMMUNITY

## 2023-05-22 NOTE — PROGRESS NOTES
Assessment & Plan     Plantar warts  Destroyed two of them at R lateral aspect of L plantar surface.     - HI DESTROY < 15 BENIGN SKIN LESIONS    Class 3 severe obesity due to excess calories without serious comorbidity with body mass index (BMI) of 40.0 to 44.9 in adult (H)   Bariatric surgery status       Status post mastectomy, bilateral     Wonders if bilateral breast MRI indicated, the plastic surgeon had mentioned this type of screening.  It is unclear to PCP if there is a place for this kind of imaging after giselle mammogram.   She will ask her surgeon.       Right shoulder pain, unspecified chronicity   h/o   Watchful waiting for now as it is intermittent, fleeting.      RITU (obstructive sleep apnea)   wants to examine her needs.  Prefers mouthguard if eligible.   - Adult ENT  Referral; Future       I spent a total of 30 minutes on the day of the visit.   Time spent by me doing chart review, history and exam, documentation and further activities per the note        See Patient Instructions    Tonya Mtz MD  [facility] JACEY Holm is a 57 year old, presenting for the following health issues:  No chief complaint on file.      > Had biopsy L calf by derm.   Benign.  But the area is rough, wondering if an issue.   > Has Dx RITU.   Nasal procedures did not help.   Used to use CPAP prior to her gastric bypass.   Was not well tolerated.   She is considering a dental appliance.   She had been working with ENT and the recent study was an overnight home oximetry recording.                 View : No data to display.              History of Present Illness       Reason for visit:  Sore on calf that won't heal and bunion on foot  Symptom onset:  More than a month  Symptoms include:  Sore that's rough to the touch on calf and sore under bunion on bottom of foot  Symptom intensity:  Moderate  Symptom progression:  Staying the same  Had these symptoms before:  No  What makes it  "worse:  Walking too much    She eats 4 or more servings of fruits and vegetables daily.She consumes 0 sweetened beverage(s) daily.She exercises with enough effort to increase her heart rate 30 to 60 minutes per day.  She exercises with enough effort to increase her heart rate 7 days per week.   She is taking medications regularly.               Review of Systems   Constitutional, HEENT, cardiovascular, pulmonary, gi and gu systems are negative, except as otherwise noted.      Objective    /76 (BP Location: Left arm, Patient Position: Sitting, Cuff Size: Adult Large)   Pulse 63   Temp 98.3  F (36.8  C) (Oral)   Ht 1.702 m (5' 7\")   Wt 122 kg (269 lb)   SpO2 97%   BMI 42.13 kg/m    There is no height or weight on file to calculate BMI.  Physical Exam   GENERAL: healthy, alert and no distress  EYES: Eyes grossly normal to inspection, PERRL and conjunctivae and sclerae normal  MS: no gross musculoskeletal defects noted, no edema  SKIN: no suspicious lesions or rashes    Planter  Warts at left lateral plantar surface, two of them.   NEURO: Normal strength and tone, mentation intact and speech normal  PSYCH: mentation appears normal, affect normal/bright    No results found for this or any previous visit (from the past 24 hour(s)).                "

## 2023-05-22 NOTE — PATIENT INSTRUCTIONS
ENT REferrral:    A  will call you to coordinate ... If you don t hear from a representative within 2 business days, please call [phone].

## 2023-06-22 NOTE — PROGRESS NOTES
"Virtual Visit Details    Type of service:  Video Visit     Originating Location (pt. Location): Home    Distant Location (provider location):  On-site  Platform used for Video Visit: Well     NUTRITION POST OP APPOINTMENT  DATE OF VISIT: June 22, 2023    Jenae Miller  1965  female  [id number]  57 year old     ASSESSMENT:    REASON FOR VISIT:  Jenae is a 57 year old year old female presents today for 8 year PO nutrition follow-up appointment. Patient is accompanied by self.    DIAGNOSIS:  Status post gastric sleeve surgery.  Obesity Grade III BMI >40     ANTHROPOMETRICS:  Initial Weight: 276 lbs  Height: 67\"   Previous Weight: 269 lbs 0 oz    Current Weight: 259 lbs   BMI: Body mass index is 40.5 kg/m .    VITAMINS AND MINERALS:  1 Multivitamin with Minerals (lunch - Centrum 50+; does not meet bariatric needs; finishing up supply before switching)-reviewed vitamins does not meet copper guidelines   650 mg Calcium With Vitamin D with 1000 international unit(s) (AM)   Vitamin D discontinued d/t elevated labs (96 micrograms/L on 4/4/2023)  1000 mcg Vitamin B-12 sublingual - MWF  No iron needed; post-menopausal  Cod liver oil   B1 100 mg 1 time per week  Tumeric    WEIGHT LOSS MEDICATIONS:  Bupropion (taking 1/2 dose 5 of 7 days)  Naltrexone (taking 1/2 dose 5 of 7 days)     NUTRITION HISTORY:  Breakfast: eggs or frozen egg bites or omelette or Greek yougur with fruit   Lunch: Ramen with 2 eggs + kimchi   Supper: salmon or vegetables   Snacks: nuts replaced chips   Fluids consumed: Water, Protein Drink and Hint, Fairlife Milk, Ana   Consuming liquid calories: Yes  Protein intake: 50-60 grams/day  Tolerate regular texture food: Yes  Any foods not tolerated details: No  If any food not tolerated: none  Portion size: 1 cup  Take 20-30 minutes to consume each meal: Yes   Eat protein foods first: Yes  Fluids and meals separate by at least 30 minutes: Yes  Chew foods thoroughly: Yes  Tolerating diet: " Yes  Drinking high protein supplements: Yes  Consuming snacks per day: reduced maybe 1  Additional Information: Patient has focused on eliminating soda, sweets, chips and reducing snacking and alcohol.  Patient has increased exercise now with warmer weather. Patient pleased with progress.  Patient with history of breast cancer.     PHYSICAL ACTIVITY:  Type:  3 times per week-lifting weights; walking dog   Frequency (days per week): 7 7  Duration (min): 30-60     DIAGNOSIS:  Previous Nutrition Diagnosis: Altered gastrointestinal function related to alteration in gastrointestinal structure as evidenced by history of gastric sleeve surgery.- no change    Previous goals:  Take all vitamins/minerals pr guidelines-improving   Aim for 1 cup of food with 3 food groups-improving   Replace snacks with Fair Life milk -improving     Current Nutrition Diagnosis: Altered gastrointestinal function related to alteration in gastrointestinal structure as evidenced by history of gastric sleeve surgery.    INTERVENTION:   Nutrition Prescription: Eat 3 meals a day at regular intervals. Consume 60-90 grams of protein daily. Follow post-surgical vitamin and mineral protocol.  Assessed learning needs and learning preferences.    GOALS:  Aim for 3 food groups per meal  Continue reducing snacking  Drink Fairlife milk to replace snacks     Implementation: Discussed progress toward previous goals; reinforced importance of following bariatric lifestyle changes.    NUTRITION MONITORING AND EVALUATION:  Expected patient engagement: good  Verbalized good understanding.    Follow up: Patient to follow up in 2 months.    TIME SPENT WITH PATIENT:  30 minutes    Matheus Amaro, RD, LD  [facility]   [phone] (office phone)

## 2023-06-23 ENCOUNTER — VIRTUAL VISIT (OUTPATIENT)
Dept: SURGERY | Facility: CLINIC | Age: 58
End: 2023-06-23
Payer: COMMERCIAL

## 2023-06-23 VITALS — WEIGHT: 259 LBS | BODY MASS INDEX: 40.57 KG/M2

## 2023-06-23 DIAGNOSIS — Z98.84 BARIATRIC SURGERY STATUS: Primary | ICD-10-CM

## 2023-06-23 PROCEDURE — 97803 MED NUTRITION INDIV SUBSEQ: CPT | Mod: VID

## 2023-06-23 NOTE — PATIENT INSTRUCTIONS
Emory Holm,    It was nice meeting you today.  Congratulations on your weight loss thus far.  Continuing focusing on 3 food groups per meal with 60 grams protein per day.    Vitamin Requirements    Complete multivitamin and mineral (2 per day)    Calcium with vitamin D    Calcium citrate preferred source   Take 600 mg 2 times per day or 500 mg 3 times per day   Take 2 hours apart from complete multivitamin and mineral    5000 international unit(s) vitamin D-hold per PA-C    500 mcg B12 daily (sublingual -put under your tongue) or 1000 mcg injection monthly    12 mg thiamine (B1) per day    Or 50 mg 2 times per week   Or 100 mg 1 time per week    Iron -1 daily -not required due to postmenopausal   1 Vitron C or   1 -325 mg ferrous sulfate and 1 tablet 500 mg vitamin C       Please call [phone] to schedule your next RD appointment in 8 weeks.    Have a great vacation!    Emelyn Medina MEd, RD, LD  [facility] Outpatient Dietitian/[facility]   [phone] (office phone)

## 2023-07-07 ENCOUNTER — TELEPHONE (OUTPATIENT)
Dept: SURGERY | Facility: CLINIC | Age: 58
End: 2023-07-07
Payer: COMMERCIAL

## 2023-07-07 NOTE — PROGRESS NOTES
"Alta is a 57 year old who is being evaluated via a billable video visit.      The patient has been notified of following:     \"This video visit will be conducted via a call between you and your physician/provider. We have found that certain health care needs can be provided without the need for an in-person physical exam.  This service lets us provide the care you need with a video conversation.  If a prescription is necessary we can send it directly to your pharmacy.  If lab work is needed we can place an order for that and you can then stop by our lab to have the test done at a later time.    Video visits are billed at different rates depending on your insurance coverage.  Please reach out to your insurance provider with any questions.    If during the course of the call the physician/provider feels a video visit is not appropriate, you will not be charged for this service.\"    Patient has given verbal consent for Video visit? Yes    How would you like to obtain your AVS? MyChart    If the video visit is dropped, the invitation should be resent by: Text to cell phone: [phone]    Will anyone else be joining your video visit? No    I    Video-Visit Details    Type of service:  Video Visit    Video Start Time: 11:14 AM    Video End Time:11:43 AM    Originating Location (pt. Location): Home    Distant Location (provider location):  [facility] [place]     Platform used for Video Visit: [employer]cur[employer]                    7/10/2023      Return Medical Weight Management Note     Jenae Miller  MRN:  [id number]  :  1965    Dear Tonya Mtz MD,    I had the pleasure of seeing your patient Jenae Miller. She is a 57 year old female who I am continuing to see for treatment of obesity related to:         No data to display                Assessment & Plan   Problem List Items Addressed This Visit     Bariatric surgery status     2015 Gastric Sleeve Dr. Selina Gonzales " [facility]         Class 3 severe obesity due to excess calories without serious comorbidity with body mass index (BMI) of 40.0 to 44.9 in adult (H) - Primary     Patient was congratulated on wt loss success thus far. Healthy habits to assist with further weight loss were discussed. Alta will continue naltrexone and increase dose to 50 mg either once daily at noon for 1/2 tablet twice daily.  She will stop the bupropion due to SE. Goals noted in pt instructions.              Relevant Orders    Hepatic panel    Postsurgical malabsorption     Will restart vitamin D at 2000 international unit(s).    Switched MVI to one with all bariatric requirements (Centrum Adult Daily)  Will gets labs annually.             PATIENT INSTRUCTIONS:  Alta will continue naltrexone and increase dose to 50 mg either once daily at noon for 1/2 tablet twice daily.      Stop the bupropion due to SE.     Goals:  Add in a walk with neighbor for cardio and she LINDA training.      FOLLOW-UP:    Please call [phone] to schedule your next visit in 3 months.     30 minutes spent on the date of the encounter doing chart review, history and exam, result review, counseling, developing plan of care, documentation, and further activities as noted      INTERVAL HISTORY:  Alta returns for medical weight management follow up.  Last seen for establish care visit on 3/30/2023.  On 06/16/2015 had Gastric Sleeve with Dr. Marks at [facility] in [place].    We started on bupropion and naltrexone.  (Wegovy not covered).  Has seen dietician monthly.     The medication is helping with cravings.  Is taking them both around noon.  Was not taking either of them when she was on vacation.      Last diet note:  Patient has focused on eliminating soda, sweets, chips and reducing snacking and alcohol.  Patient has increased exercise now with warmer weather. Patient pleased with progress.     WEIGHT METRICS:  Body mass index is 40.57 kg/m .   Current Weight: 259  lb (117.5 kg)  Last Visits Weight: 279 lb (126.6 kg)  Initial Weight (lbs): 276 lbs  Cumulative weight loss (lbs): 17  Weight Loss Percentage: 6.16%    Wt Readings from Last 10 Encounters:   07/10/23 259 lb (117.5 kg)   06/23/23 259 lb (117.5 kg)   05/22/23 269 lb (122 kg)   04/28/23 269 lb (122 kg)   03/31/23 279 lb (126.6 kg)   03/30/23 279 lb (126.6 kg)   12/08/22 276 lb (125.2 kg)   10/15/21 253 lb (114.8 kg)   08/10/20 243 lb (110.2 kg)   09/13/19 238 lb 8 oz (108.2 kg)            7/7/2023     4:37 PM   Weight Loss Medication History Reviewed With Patient   Which weight loss medications are you currently taking on a regular basis? Naltrexone    Bupropion   Are you having any side effects from the weight loss medication that we have prescribed you? Yes   If you are having side effects please describe: Crazy vivid dreams, dry mouth, swelling in my ankles, excessive sweating   Wakes up exhausted due to the dreams.          7/7/2023     4:37 PM   Changes and Difficulties   I have made the following changes to my diet since my last visit: I cut out most soda, alcohol, and sweets   With regards to my diet, I am still struggling with: Cravings   I have made the following changes to my activity/exercise since my last visit: I work out (lift weights) 3-4 times a week with a    With regards to my activity/exercise, I am still struggling with: Getting enough vigorous cardio   Walks for a hour with the dog.  Plans on walking with neighbor to get a more cardio type walk in.       MEDICATIONS:   Current Outpatient Medications   Medication Sig Dispense Refill     CALCIUM CITRATE PO        CENTRUM OR TABS 1 TABLET DAILY       cod liver oil CAPS capsule        Cyanocobalamin (VITAMIN B 12 PO)        CycloSPORINE (RESTASIS OP) Apply  to eye 2 times daily.       losartan-hydrochlorothiazide (HYZAAR) 50-12.5 MG tablet Take 1 tablet by mouth daily 90 tablet 3     naltrexone (DEPADE/REVIA) 50 MG tablet Take 1/2  tablet 1-2 hours before biggest craving time for 7 days, then increase to 1/2 tablet twice daily. 90 tablet 1     thiamine (VITAMIN B1) 100 MG tablet Take 100 mg by mouth once a week         LABS:  Hemoglobin A1C   Date Value Ref Range Status   04/04/2023 5.4 0.0 - 5.6 % Final     Comment:     Normal <5.7%   Prediabetes 5.7-6.4%    Diabetes 6.5% or higher     Note: Adopted from ADA consensus guidelines.     Vitamin D, Total (25-Hydroxy)   Date Value Ref Range Status   04/04/2023 96 (H) 20 - 75 ug/L Final     TSH   Date Value Ref Range Status   02/27/2015 4.84 mcU/mL Final     Sodium   Date Value Ref Range Status   12/30/2022 141 133 - 144 mmol/L Final   11/23/2015 139 133 - 144 mmol/L Final     Potassium   Date Value Ref Range Status   12/30/2022 4.1 3.4 - 5.3 mmol/L Final   11/23/2015 3.9 3.4 - 5.3 mmol/L Final     Chloride   Date Value Ref Range Status   12/30/2022 106 94 - 109 mmol/L Final   11/23/2015 105 94 - 109 mmol/L Final     Carbon Dioxide   Date Value Ref Range Status   11/23/2015 28 20 - 32 mmol/L Final     Carbon Dioxide (CO2)   Date Value Ref Range Status   12/30/2022 28 20 - 32 mmol/L Final     Anion Gap   Date Value Ref Range Status   12/30/2022 7 3 - 14 mmol/L Final   11/23/2015 6 3 - 14 mmol/L Final     Glucose   Date Value Ref Range Status   12/30/2022 98 70 - 99 mg/dL Final   11/23/2015 75 70 - 99 mg/dL Final     Urea Nitrogen   Date Value Ref Range Status   12/30/2022 13 7 - 30 mg/dL Final   11/23/2015 14 7 - 30 mg/dL Final     Creatinine   Date Value Ref Range Status   12/30/2022 0.88 0.52 - 1.04 mg/dL Final   11/23/2015 0.89 0.52 - 1.04 mg/dL Final     GFR Estimate   Date Value Ref Range Status   12/30/2022 77 >60 mL/min/1.73m2 Final     Comment:     Effective December 21, 2021 eGFRcr in adults is calculated using the 2021 CKD-EPI creatinine equation which includes age and gender (Shukri et al., NEJM, DOI: 10.1056/AYHPky7186784)   11/23/2015 68 >60 mL/min/1.7m2 Final     Comment:     Non   GFR Calc     Calcium   Date Value Ref Range Status   12/30/2022 9.7 8.5 - 10.1 mg/dL Final   11/23/2015 9.3 8.5 - 10.1 mg/dL Final     Bilirubin Total   Date Value Ref Range Status   04/04/2023 0.6 0.2 - 1.3 mg/dL Final   02/27/2015 0.5 mg/dL Final     Alkaline Phosphatase   Date Value Ref Range Status   04/04/2023 117 40 - 150 U/L Final   02/27/2015 55 U/L Final     ALT   Date Value Ref Range Status   04/04/2023 31 0 - 50 U/L Final   08/28/2015 14 U/L Final     AST   Date Value Ref Range Status   04/04/2023 28 0 - 45 U/L Final   08/28/2015 19 U/L Final     Cholesterol   Date Value Ref Range Status   10/08/2021 205 (H) <200 mg/dL Final   01/23/2013 191 0 - 200 mg/dL Final     Comment:     LDL Cholesterol is the primary guide to therapy.   The NCEP recommends further evaluation of: patients with cholesterol greater   than 200 mg/dL if additional risk factors are present, cholesterol greater   than   240 mg/dL, triglycerides greater than 150 mg/dL, or HDL less than 40 mg/dL.     HDL Cholesterol   Date Value Ref Range Status   01/23/2013 86 50 - 110 mg/dL Final     Direct Measure HDL   Date Value Ref Range Status   10/08/2021 108 >=50 mg/dL Final     LDL Cholesterol Calculated   Date Value Ref Range Status   10/08/2021 79 <=100 mg/dL Final   01/23/2013 78 0 - 129 mg/dL Final     Comment:     LDL Cholesterol is the primary guide to therapy: LDL-cholesterol goal in high   risk patients is <100 mg/dL and in very high risk patients is <70 mg/dL.     Triglycerides   Date Value Ref Range Status   10/08/2021 91 <150 mg/dL Final   01/23/2013 136 0 - 150 mg/dL Final     Comment:     Fasting specimen     WBC   Date Value Ref Range Status   11/23/2015 12.4 (H) 4.0 - 11.0 10e9/L Final     WBC Count   Date Value Ref Range Status   04/04/2023 7.9 4.0 - 11.0 10e3/uL Final     Hemoglobin   Date Value Ref Range Status   04/04/2023 14.0 11.7 - 15.7 g/dL Final   11/23/2015 13.3 11.7 - 15.7 g/dL Final     Hematocrit  "  Date Value Ref Range Status   04/04/2023 42.7 35.0 - 47.0 % Final   11/23/2015 41.4 35.0 - 47.0 % Final     MCV   Date Value Ref Range Status   04/04/2023 94 78 - 100 fL Final   11/23/2015 97 78 - 100 fl Final     Platelet Count   Date Value Ref Range Status   04/04/2023 306 150 - 450 10e3/uL Final   11/23/2015 264 150 - 450 10e9/L Final         BP Readings from Last 6 Encounters:   05/22/23 133/76   03/30/23 (!) 143/80   12/30/22 126/80   12/08/22 (!) 144/85   10/15/21 130/70   08/10/20 (!) 141/85       Pulse Readings from Last 6 Encounters:   05/22/23 63   03/30/23 70   12/30/22 66   12/08/22 95   10/15/21 69   08/10/20 63       PE:  Ht 5' 7\" (1.702 m)   Wt 259 lb (117.5 kg)   BMI 40.57 kg/m    GENERAL: Healthy, alert and no distress  EYES: Eyes grossly normal to inspection.  No discharge or erythema, or obvious scleral/conjunctival abnormalities.  RESP: No audible wheeze, cough, or visible cyanosis.  No visible retractions or increased work of breathing.    SKIN: Visible skin clear. No significant rash, abnormal pigmentation or lesions.  NEURO: Cranial nerves grossly intact.  Mentation and speech appropriate for age.  PSYCH: Mentation appears normal, affect normal/bright, judgement and insight intact, normal speech and appearance well-groomed.      Sincerely,      Mag Soto PA-C        "

## 2023-07-10 ENCOUNTER — VIRTUAL VISIT (OUTPATIENT)
Dept: SURGERY | Facility: CLINIC | Age: 58
End: 2023-07-10
Payer: COMMERCIAL

## 2023-07-10 VITALS — WEIGHT: 259 LBS | BODY MASS INDEX: 40.65 KG/M2 | HEIGHT: 67 IN

## 2023-07-10 DIAGNOSIS — E66.01 CLASS 3 SEVERE OBESITY DUE TO EXCESS CALORIES WITHOUT SERIOUS COMORBIDITY WITH BODY MASS INDEX (BMI) OF 40.0 TO 44.9 IN ADULT (H): Primary | ICD-10-CM

## 2023-07-10 DIAGNOSIS — E66.813 CLASS 3 SEVERE OBESITY DUE TO EXCESS CALORIES WITHOUT SERIOUS COMORBIDITY WITH BODY MASS INDEX (BMI) OF 40.0 TO 44.9 IN ADULT (H): Primary | ICD-10-CM

## 2023-07-10 DIAGNOSIS — K91.2 POSTSURGICAL MALABSORPTION: ICD-10-CM

## 2023-07-10 DIAGNOSIS — Z98.84 BARIATRIC SURGERY STATUS: ICD-10-CM

## 2023-07-10 PROCEDURE — 99214 OFFICE O/P EST MOD 30 MIN: CPT | Mod: VID | Performed by: PHYSICIAN ASSISTANT

## 2023-07-10 NOTE — PATIENT INSTRUCTIONS
"To ensure quality you may receive a patient satisfaction survey. The greatest compliment you can give is \"Likely to Recommend.\"    Nice to talk with you today.  Thank you for your trust. Below is the plan discussed.-  AMBER Hathaway      Plan:  Alta will continue naltrexone and increase dose to 50 mg either once daily at noon for 1/2 tablet twice daily.      Stop the bupropion due to SE.     Goals:  Add in a walk with neighbor for cardio and she LINDA training.      FOLLOW-UP:    Please call [phone] to schedule your next visit in 3 months.       "

## 2023-07-10 NOTE — ASSESSMENT & PLAN NOTE
Patient was congratulated on wt loss success thus far. Healthy habits to assist with further weight loss were discussed. Alta will continue naltrexone and increase dose to 50 mg either once daily at noon for 1/2 tablet twice daily.  She will stop the bupropion due to SE. Goals noted in pt instructions.

## 2023-07-10 NOTE — ASSESSMENT & PLAN NOTE
Will restart vitamin D at 2000 international unit(s).    Switched MVI to one with all bariatric requirements (Centrum Adult Daily)  Will gets labs annually.

## 2023-07-18 NOTE — Clinical Note
Patient is seen at the request of Juan Jalloh DO.    Reason for Consult:    Chief Complaint   Patient presents with   • Office Visit     Cardiology    • Consultation     Irregular heart rate   Labs 7/6/23  Xr chest pa and lateral 2 views 7/6/23    accompanied by sister    HISTORY OF PRESENT ILLNESS: Mundo Jones is a 70 year old Male. He has hx irregular rhythm. Appetite is poor. He has lost t weight  He has hx etoh use  He denies sleep disturbance. He fell last week but admits he has weakness of both legs.     PAST MEDICAL HISTORY:     Malignant neoplasm (CMD)                                      Prostate CA (CMD)                                               Comment: s/p RT    Vitamin D deficiency                                          Elevated BP without diagnosis of hypertension                 Hyperlipemia                                                  Macrocytosis                                                  Hypertension                                                  COPD (chronic obstructive pulmonary disease) (*               Low serum testosterone level in male                          Hypertensive retinopathy                                      PAST SURGICAL HISTORY:   There is no previous surgical history on file.    ALLERGIES:  Patient has no known allergies.    MEDICATIONS:   Outpatient Medications Marked as Taking for the 7/18/23 encounter (Office Visit) with Leigh Street MD   Medication Sig Dispense Refill   • amLODIPine (NORVASC) 10 MG tablet TAKE 1 TABLET BY MOUTH EVERY DAY 90 tablet 0   • rosuvastatin (CRESTOR) 5 MG tablet Take 1 tablet by mouth daily. 30 tablet 6        SOCIAL HISTORY:    reports no history of alcohol use.    reports that he has been smoking cigars. He uses smokeless tobacco.     Drug Use:    No                FAMILY HISTORY:   Family History   Family history unknown: Yes       The 10-year ASCVD risk score (Marty PATEL, et al., 2019) is: 18.6%    Values used to  Please help me update chart:  S/p bilateral mastectomy so mammogram not needed....   She does get MRI breast area q5 years.   Leigh Street MD   07/18/23

## 2023-08-28 ENCOUNTER — VIRTUAL VISIT (OUTPATIENT)
Dept: SURGERY | Facility: CLINIC | Age: 58
End: 2023-08-28
Payer: COMMERCIAL

## 2023-08-28 VITALS — HEIGHT: 67 IN | WEIGHT: 259 LBS | BODY MASS INDEX: 40.65 KG/M2

## 2023-08-28 DIAGNOSIS — Z98.84 BARIATRIC SURGERY STATUS: ICD-10-CM

## 2023-08-28 DIAGNOSIS — E66.813 CLASS 3 SEVERE OBESITY DUE TO EXCESS CALORIES WITHOUT SERIOUS COMORBIDITY WITH BODY MASS INDEX (BMI) OF 40.0 TO 44.9 IN ADULT (H): Primary | ICD-10-CM

## 2023-08-28 DIAGNOSIS — E66.01 CLASS 3 SEVERE OBESITY DUE TO EXCESS CALORIES WITHOUT SERIOUS COMORBIDITY WITH BODY MASS INDEX (BMI) OF 40.0 TO 44.9 IN ADULT (H): Primary | ICD-10-CM

## 2023-08-28 PROCEDURE — 97803 MED NUTRITION INDIV SUBSEQ: CPT | Mod: VID

## 2023-08-28 NOTE — PROGRESS NOTES
"Alta is a 57 year old who is being evaluated via a billable video visit.      How would you like to obtain your AVS? MyChart  If the video visit is dropped, the invitation should be resent by: Text to cell phone: [phone]  Will anyone else be joining your video visit? No            Video-Visit Details    Type of service:  Video Visit     Originating Location (pt. Location): Home    Distant Location (provider location):  Off-site  Platform used for Video Visit: [facility]     NUTRITION POST OP APPOINTMENT  DATE OF VISIT: August 28, 2023    Jenae Miller  1965  female  [id number]  57 year old     ASSESSMENT:    REASON FOR VISIT:  Jenae is a 57 year old year old female presents today for 8 year PO nutrition follow-up appointment. Patient is accompanied by self.    DIAGNOSIS:  Status post gastric sleeve surgery.  Obesity Obesity Grade III BMI >40     ANTHROPOMETRICS:  Initial Weight: 276 lbs   Height: 67\"   Current Weight: 259 lbs 0 oz     BMI: Body mass index is 40.57 kg/m .    VITAMINS AND MINERALS:  2 Multivitamin with Minerals (Centrum Adult; lunch  500 mg Calcium With Vitamin D (AM +/- dinner)  2000 international unit(s) Vitamin D -  MWF  1000 mcg Vitamin B-12 sublingual - MWF  No iron needed; post-menopausal  Vitamin B1 100 mg 1 time per week    NUTRITION HISTORY:  Breakfast: greek yogurt + blueberries  frozen egg bites  Lunch: (1/2 serving) ramen w/2 eggs + kimchi or pickled turnips  Supper: beef brat +/- 1/2 bun + vegetables  Snacks: chips, cookies, ice cream, low-duncan/fat fudge bar   Fluids consumed: Water/enhanced water (48-64oz), coffee (8oz), milk (8-10oz Fair Life Skim)   Consuming liquid calories: Yes  Protein intake: 50-60 grams/day  Tolerate regular texture food: Yes  Portion size: 1 cup or more  Take 20-30 minutes to consume each meal: Yes   Eat protein foods first: No  Fluids and meals separate by at least 30 minutes: No  Chew foods thoroughly: Yes  Tolerating diet: Yes  Drinking high " protein supplements: No  Consuming snacks per day: 1-2  Additional Information: Pt reports no weight loss but pleased to have maintained weight amidst many vacations. Reviewed rationale for avoiding liquids with meals. Reviewed protein goals.         PHYSICAL ACTIVITY:  Type: yoga, walking, strength training  Frequency (days per week): 3-5  Duration (min): 30-60    DIAGNOSIS:  Previous Nutrition Diagnosis: Altered gastrointestinal function related to alteration in gastrointestinal structure as evidenced by history of gastric sleeve surgery.- no change    Previous goals:  Aim for 3 food groups per meal - not met  Continue reducing snacking - not met  Drink Fairlife milk to replace snacks - partially met    Current Nutrition Diagnosis: Altered gastrointestinal function related to alteration in gastrointestinal structure as evidenced by history of gastric sleeve surgery.    INTERVENTION:   Nutrition Prescription: Eat 3 meals a day at regular intervals. Consume 60-90 grams of protein daily. Follow post-surgical vitamin and mineral protocol.  Assessed learning needs and learning preferences.    GOALS:  Have a protein drink or 2 glasses of FL milk daily  Have 3 food groups per meal  Separate fluids and meals by 30 minutes  Take Calcium per guidelines    Follow-Up:   Recommend q2-3m follow up visits to assist with lifestyle changes or per insurance.  Implementation: Discussed progress toward previous goals; reinforced importance of following bariatric lifestyle changes.    NUTRITION MONITORING AND EVALUATION:  Anticipated compliance: good  Verbalized good understanding.    Follow up: Patient to follow up in 2-3 months.    TIME SPENT WITH PATIENT:  19 minutes        Janie Dean RD, LD  Clinical Dietitian

## 2023-08-28 NOTE — PATIENT INSTRUCTIONS
[facility] Holm!      It was great chatting with you again today! Here's a summary of the goals we discussed:    1. Increase Calcium  - Take 500mg 3x/day OR 600mg 2x/day  - Take separate from your multivitamin by at least 2 hours    2. Separate liquids from solids  - Avoid sipping/drinking while eating meals/snacks, as well as for 30 minutes afterwards    3. Have 3 food groups per meal  - 1/2c protein + 1/4c vegetable + 1/4c fruit or starch    4. Add in more protein supplementation between meals  - 2 glasses of fair life milk, or 1 protein shake per day would be perfect  - Check out some of the protein phillip that are now on the market: Gatorade Protein, Premier Protein Clear, Protein-2-O, BiPro water additive, MyProtein water additive      Plan on following up in 2-3 months. This can be scheduled via our call center at . Reach out sooner with any questions or concerns. Have a great day!      Janie Dean RD, LD  Clinical Dietitian

## 2023-09-27 NOTE — PROGRESS NOTES
History of Present Illness - Jenae Miller is a particularly pleasant 58 year old female here to see me for the first time for evaluation of the upper airway due to her RITU.    This all started due to several years of poor sleep quality.  She had an overnight sleep study about 13 years ago and was tried on a CPAP and also had weight loss surgery and therefore didn't need the CPAP anymore.  But she has gained weight and her RITU came back which led to her re-evaluation by [facility].  She was seen by Dr Golden at [facility], and also had a home sleep study in March of 2023.    She had a septoplasty in 2015 that worked perfectly, and a tonsillectomy in 2005    Past Medical History -   Patient Active Problem List   Diagnosis    CARDIOVASCULAR SCREENING; LDL GOAL LESS THAN 160    Family history of breast cancer in mother    Varicose veins of legs    Colon polyp    Thrush, oral    Intermittent asthma    Congenital anomaly of fixation of intestine    Bariatric surgery status    HX: breast cancer    Serrated adenoma of colon    overweight HCC    Status post mastectomy, bilateral    Primary osteoarthritis of both knees    Monoallelic mutation of CHEK2 gene in female patient    Class 3 severe obesity due to excess calories without serious comorbidity with body mass index (BMI) of 40.0 to 44.9 in adult (H)    Postsurgical malabsorption       Current Medications -   Current Outpatient Medications:     CALCIUM CITRATE PO, , Disp: , Rfl:     CENTRUM OR TABS, 1 TABLET DAILY, Disp: , Rfl:     cod liver oil CAPS capsule, , Disp: , Rfl:     Cyanocobalamin (VITAMIN B 12 PO), , Disp: , Rfl:     CycloSPORINE (RESTASIS OP), Apply  to eye 2 times daily., Disp: , Rfl:     losartan-hydrochlorothiazide (HYZAAR) 50-12.5 MG tablet, Take 1 tablet by mouth daily, Disp: 90 tablet, Rfl: 3    naltrexone (DEPADE/REVIA) 50 MG tablet, Take 1/2 tablet 1-2 hours before biggest craving time for 7 days, then increase to 1/2 tablet twice daily., Disp:  90 tablet, Rfl: 1    thiamine (VITAMIN B1) 100 MG tablet, Take 100 mg by mouth once a week, Disp: , Rfl:     Allergies -   Allergies   Allergen Reactions    Sulfa Antibiotics Anaphylaxis     Eyes red and swollen    Topiramate Hives    Adhesive Tape Rash     surgical tape  Other reaction(s): Hives    Hydrocodone-Acetaminophen Hives     Other reaction(s): Flushing, Hives  Liquid  Lortab only, reports pill with no issues      Penicillins Hives    Taxotere Hives       Social History -   Social History     Socioeconomic History    Marital status:    Tobacco Use    Smoking status: Never    Smokeless tobacco: Never   Vaping Use    Vaping Use: Never used   Substance and Sexual Activity    Alcohol use: Yes     Comment: One or two drinks a week    Drug use: No    Sexual activity: Not Currently     Partners: Male     Birth control/protection: Condom   Other Topics Concern    Parent/sibling w/ CABG, MI or angioplasty before 65F 55M? No   Social History Narrative    .  No kids.  Lives alone in [facility] ([place]  , full time.         Family History -   Family History   Problem Relation Age of Onset    Obesity Mother     Breast Cancer Mother 57         at 60 of breast cancer    Colon Polyps Father         large polyps    Diabetes Father     Hypertension Father     Obesity Father     Unknown/Adopted Maternal Grandmother     Unknown/Adopted Maternal Grandfather     Cancer Paternal Grandmother         stomach    Obesity Sister     Obesity Sister     Breast Cancer Other     Obesity Sister        Review of Systems - As per HPI and PMHx, otherwise 10+ system review of the head and neck, and general constitution is negative.    Physical Exam  /78   Pulse 80   Wt 115.8 kg (255 lb 6.4 oz)   SpO2 99%   BMI 40.00 kg/m        General - The patient is well nourished and well developed, and appears to have good nutritional status.  Alert and oriented to person and place, answers questions and  cooperates with examination appropriately.   Head and Face - Normocephalic and atraumatic, with no gross asymmetry noted of the contour of the facial features.  The facial nerve is intact, with strong symmetric movements.  Voice and Breathing - The patient was breathing comfortably without the use of accessory muscles. There was no wheezing, stridor, or stertor.  The patients voice was clear and strong, and had appropriate pitch and quality.  Ears - The tympanic membranes are normal in appearance, bony landmarks are intact.  No retraction, perforation, or masses.  No fluid or purulence was seen in the external canal or the middle ear. No evidence of infection of the middle ear or external canal, cerumen was normal in appearance.  Eyes - Extraocular movements intact, and the pupils were reactive to light.  Sclera were not icteric or injected, conjunctiva were pink and moist.  Mouth - Examination of the oral cavity showed pink, healthy oral mucosa. No lesions or ulcerations noted.  The tongue was mobile and midline, and the dentition were in good condition.    Throat - The walls of the oropharynx were smooth, pink, moist, symmetric, and had no lesions or ulcerations.  The tonsillar pillars and soft palate were symmetric.  The uvula was midline on elevation.    Neck - Normal midline excursion of the laryngotracheal complex during swallowing.  Full range of motion on passive movement.  Palpation of the occipital, submental, submandibular, internal jugular chain, and supraclavicular nodes did not demonstrate any abnormal lymph nodes or masses.  The carotid pulse was palpable bilaterally.  Palpation of the thyroid was soft and smooth, with no nodules or goiter appreciated.  The trachea was mobile and midline.  Nose - External contour is symmetric, no gross deflection or scars.  Nasal mucosa is pink and moist with no abnormal mucus.  The septum was midline and non-obstructive, turbinates of normal size and position.  No  polyps, masses, or purulence noted on examination.      A/P - Jenae Miller is a 58 year old female  (G47.33) RITU (obstructive sleep apnea)    We spent the remainder of today's visit discussing the etiology of RITU, and the fact that it is usually multifactorial.  I made it clear that a single procedure is unlikely to resolve the sleep apnea, and in her case her upper airway anatomy looks quite perfect.  I find no evidence of other pathology of the upper aerodigestive tract.      And of course, I recommended continued weight loss as an important part of reducing the burden of RITU.      There are no major anatomical issues noted on today's exam, so we discussed multiple non-invasive techniques for her to try.     I recommend starting Flonase and saline rinses for one month, to see if that alone helps.     If those medical interventions don't help, there are several other things to try such as:     1) Could try oral appliance for snoring (Oral surgeon [email or web address]  sleep-apnea-treatment/)  2) Sleep positioning device (Sleep Medicine)  3) Smart Jeanie Pillow    And if all else fails, repeat, or initial formal sleep study should be considered.

## 2023-09-29 ENCOUNTER — OFFICE VISIT (OUTPATIENT)
Dept: OTOLARYNGOLOGY | Facility: CLINIC | Age: 58
End: 2023-09-29
Payer: COMMERCIAL

## 2023-09-29 VITALS
BODY MASS INDEX: 40 KG/M2 | HEART RATE: 80 BPM | WEIGHT: 255.4 LBS | DIASTOLIC BLOOD PRESSURE: 78 MMHG | SYSTOLIC BLOOD PRESSURE: 117 MMHG | OXYGEN SATURATION: 99 %

## 2023-09-29 DIAGNOSIS — G47.33 OSA (OBSTRUCTIVE SLEEP APNEA): ICD-10-CM

## 2023-09-29 PROCEDURE — 99203 OFFICE O/P NEW LOW 30 MIN: CPT | Performed by: OTOLARYNGOLOGY

## 2023-09-29 RX ORDER — IPRATROPIUM BROMIDE 21 UG/1
SPRAY, METERED NASAL
COMMUNITY
Start: 2023-01-17 | End: 2024-07-08

## 2023-09-29 RX ORDER — FLUTICASONE PROPIONATE 50 MCG
2 SPRAY, SUSPENSION (ML) NASAL DAILY
COMMUNITY
Start: 2023-04-04 | End: 2024-07-08

## 2023-09-29 RX ORDER — OXYMETAZOLINE HYDROCHLORIDE 0.05 G/100ML
SPRAY NASAL
COMMUNITY
Start: 2022-11-16 | End: 2024-07-08

## 2023-09-29 RX ORDER — ECHINACEA PURPUREA EXTRACT 125 MG
TABLET ORAL
COMMUNITY
Start: 2023-04-22 | End: 2024-07-08

## 2023-09-29 NOTE — LETTER
9/29/2023         RE: Jenae Miller  [address] [place] [address]        Dear Colleague,    Thank you for referring your patient, Jenae Miller, to the [facility]. Please see a copy of my visit note below.    History of Present Illness - Jenae Miller is a particularly pleasant 58 year old female here to see me for the first time for evaluation of the upper airway due to her RITU.    This all started due to several years of poor sleep quality.  She had an overnight sleep study about 13 years ago and was tried on a CPAP and also had weight loss surgery and therefore didn't need the CPAP anymore.  But she has gained weight and her RITU came back which led to her re-evaluation by [facility].  She was seen by Dr Golden at [facility], and also had a home sleep study in March of 2023.    She had a septoplasty in 2015 that worked perfectly, and a tonsillectomy in 2005    Past Medical History -   Patient Active Problem List   Diagnosis     CARDIOVASCULAR SCREENING; LDL GOAL LESS THAN 160     Family history of breast cancer in mother     Varicose veins of legs     Colon polyp     Thrush, oral     Intermittent asthma     Congenital anomaly of fixation of intestine     Bariatric surgery status     HX: breast cancer     Serrated adenoma of colon     overweight HCC     Status post mastectomy, bilateral     Primary osteoarthritis of both knees     Monoallelic mutation of CHEK2 gene in female patient     Class 3 severe obesity due to excess calories without serious comorbidity with body mass index (BMI) of 40.0 to 44.9 in adult (H)     Postsurgical malabsorption       Current Medications -   Current Outpatient Medications:      CALCIUM CITRATE PO, , Disp: , Rfl:      CENTRUM OR TABS, 1 TABLET DAILY, Disp: , Rfl:      cod liver oil CAPS capsule, , Disp: , Rfl:      Cyanocobalamin (VITAMIN B 12 PO), , Disp: , Rfl:      CycloSPORINE (RESTASIS OP), Apply  to eye 2 times daily.,  Disp: , Rfl:      losartan-hydrochlorothiazide (HYZAAR) 50-12.5 MG tablet, Take 1 tablet by mouth daily, Disp: 90 tablet, Rfl: 3     naltrexone (DEPADE/REVIA) 50 MG tablet, Take 1/2 tablet 1-2 hours before biggest craving time for 7 days, then increase to 1/2 tablet twice daily., Disp: 90 tablet, Rfl: 1     thiamine (VITAMIN B1) 100 MG tablet, Take 100 mg by mouth once a week, Disp: , Rfl:     Allergies -   Allergies   Allergen Reactions     Sulfa Antibiotics Anaphylaxis     Eyes red and swollen     Topiramate Hives     Adhesive Tape Rash     surgical tape  Other reaction(s): Hives     Hydrocodone-Acetaminophen Hives     Other reaction(s): Flushing, Hives  Liquid  Lortab only, reports pill with no issues       Penicillins Hives     Taxotere Hives       Social History -   Social History     Socioeconomic History     Marital status:    Tobacco Use     Smoking status: Never     Smokeless tobacco: Never   Vaping Use     Vaping Use: Never used   Substance and Sexual Activity     Alcohol use: Yes     Comment: One or two drinks a week     Drug use: No     Sexual activity: Not Currently     Partners: Male     Birth control/protection: Condom   Other Topics Concern     Parent/sibling w/ CABG, MI or angioplasty before 65F 55M? No   Social History Narrative    .  No kids.  Lives alone in [facility] ([place]  , full time.         Family History -   Family History   Problem Relation Age of Onset     Obesity Mother      Breast Cancer Mother 57         at 60 of breast cancer     Colon Polyps Father         large polyps     Diabetes Father      Hypertension Father      Obesity Father      Unknown/Adopted Maternal Grandmother      Unknown/Adopted Maternal Grandfather      Cancer Paternal Grandmother         stomach     Obesity Sister      Obesity Sister      Breast Cancer Other      Obesity Sister        Review of Systems - As per HPI and PMHx, otherwise 10+ system review of the head and neck, and  general constitution is negative.    Physical Exam  /78   Pulse 80   Wt 115.8 kg (255 lb 6.4 oz)   SpO2 99%   BMI 40.00 kg/m        General - The patient is well nourished and well developed, and appears to have good nutritional status.  Alert and oriented to person and place, answers questions and cooperates with examination appropriately.   Head and Face - Normocephalic and atraumatic, with no gross asymmetry noted of the contour of the facial features.  The facial nerve is intact, with strong symmetric movements.  Voice and Breathing - The patient was breathing comfortably without the use of accessory muscles. There was no wheezing, stridor, or stertor.  The patients voice was clear and strong, and had appropriate pitch and quality.  Ears - The tympanic membranes are normal in appearance, bony landmarks are intact.  No retraction, perforation, or masses.  No fluid or purulence was seen in the external canal or the middle ear. No evidence of infection of the middle ear or external canal, cerumen was normal in appearance.  Eyes - Extraocular movements intact, and the pupils were reactive to light.  Sclera were not icteric or injected, conjunctiva were pink and moist.  Mouth - Examination of the oral cavity showed pink, healthy oral mucosa. No lesions or ulcerations noted.  The tongue was mobile and midline, and the dentition were in good condition.    Throat - The walls of the oropharynx were smooth, pink, moist, symmetric, and had no lesions or ulcerations.  The tonsillar pillars and soft palate were symmetric.  The uvula was midline on elevation.    Neck - Normal midline excursion of the laryngotracheal complex during swallowing.  Full range of motion on passive movement.  Palpation of the occipital, submental, submandibular, internal jugular chain, and supraclavicular nodes did not demonstrate any abnormal lymph nodes or masses.  The carotid pulse was palpable bilaterally.  Palpation of the thyroid was  soft and smooth, with no nodules or goiter appreciated.  The trachea was mobile and midline.  Nose - External contour is symmetric, no gross deflection or scars.  Nasal mucosa is pink and moist with no abnormal mucus.  The septum was midline and non-obstructive, turbinates of normal size and position.  No polyps, masses, or purulence noted on examination.      A/P - Jenae Miller is a 58 year old female  (G47.33) RITU (obstructive sleep apnea)    We spent the remainder of today's visit discussing the etiology of RITU, and the fact that it is usually multifactorial.  I made it clear that a single procedure is unlikely to resolve the sleep apnea, and in her case her upper airway anatomy looks quite perfect.  I find no evidence of other pathology of the upper aerodigestive tract.      And of course, I recommended continued weight loss as an important part of reducing the burden of RITU.      There are no major anatomical issues noted on today's exam, so we discussed multiple non-invasive techniques for her to try.     I recommend starting Flonase and saline rinses for one month, to see if that alone helps.     If those medical interventions don't help, there are several other things to try such as:     1) Could try oral appliance for snoring (Oral surgeon [email or web address]  sleep-apnea-treatment/)  2) Sleep positioning device (Sleep Medicine)  3) Smart Jeanie Pillow    And if all else fails, repeat, or initial formal sleep study should be considered.         Again, thank you for allowing me to participate in the care of your patient.        Sincerely,        Jose Antonio Miller MD

## 2023-09-29 NOTE — PATIENT INSTRUCTIONS
1) Could try oral appliance for snoring (Oral surgeon [email or web address]  sleep-apnea-treatment/)  2) Sleep positioning device (Sleep Medicine)  3) Smart Jeanie Pillow

## 2023-10-12 NOTE — PROGRESS NOTES
"Alta is a 57 year old who is being evaluated via a billable video visit.      The patient has been notified of following:     \"This video visit will be conducted via a call between you and your physician/provider. We have found that certain health care needs can be provided without the need for an in-person physical exam.  This service lets us provide the care you need with a video conversation.  If a prescription is necessary we can send it directly to your pharmacy.  If lab work is needed we can place an order for that and you can then stop by our lab to have the test done at a later time.    Video visits are billed at different rates depending on your insurance coverage.  Please reach out to your insurance provider with any questions.    If during the course of the call the physician/provider feels a video visit is not appropriate, you will not be charged for this service.\"    Patient has given verbal consent for Video visit? Yes    How would you like to obtain your AVS? MyChart    If the video visit is dropped, the invitation should be resent by: MY CHART    Will anyone else be joining your video visit? No    I    Video-Visit Details    Type of service:  Video Visit    Video Start Time: 11:08 AM    Video End Time:11:32 AM    Originating Location (pt. Location): Home    Distant Location (provider location):  [facility] [place]     Platform used for Video Visit: [employer]        10/20/2023       Return Medical Weight Management Note     Jenae Miller  MRN:  [id number]  :  1965    Dear Tonya Mtz MD,    I had the pleasure of seeing your patient Jenae Miller. She is a 57 year old female who I am continuing to see for treatment of obesity.    Assessment & Plan   Problem List Items Addressed This Visit       Bariatric surgery status     2015 Gastric Sleeve  July Janet López          Malnutrition following gastrointestinal surgery - Primary     " Continue taking recommended post-op vitamins.  Labs do be brawn in April except for Vit D which will draw now           Relevant Orders    Vitamin D Screen    Class 2 obesity due to excess calories without serious comorbidity with body mass index (BMI) of 39.0 to 39.9 in adult     Patient was congratulated on wt loss success thus far. Healthy habits to assist with further weight loss were discussed. Alta will restart Naltrexone.  She will have LFTs checked.Goals noted in pt instructions.              Relevant Orders    Hepatic panel          PATIENT INSTRUCTIONS:    Restart Naltrexone 1 tablet in the afternoon/evening.  Can stop if desired for a few days if you decide you want to partake in holiday treats.    Follow up with the dietician     Labs ordered.  Please call [phone] to set up a lab appt.     Goals:  See PT for back injury  Focus on eliminating sweets form the house    FOLLOW-UP:    Please call [phone] to schedule your next visit in 3 months     30 minutes spent on the date of the encounter doing chart review, history and exam, result review, counseling, developing plan of care, documentation, and further activities as noted      INTERVAL HISTORY:  Alta returns for medical weight management follow up.  Last seen on 7/10/2023.  On 06/16/2015 had Gastric Sleeve with Dr. Marks at [facility] in [place].   On naltrexone and increased dose to 50 mg in afternoon.    Has not been taking it for the last 30-45 days.  [place] to a senior living facility and did not bring it with her.  Has added sweets back in during this time.  Did lose weight because she was busy moving dad.  Not sure she should restart.  When on it consistently she feels it helps with cravings.  With the holidays- cravings are at her worse.  She also does not want to miss out on holiday treats.     WEIGHT METRICS:  Body mass index is 39.94 kg/m .   Current Weight: 255 lb (115.7 kg) (PATIENT  REPORTED)  Last Visits Weight: 259 lb (117.5 kg)  Initial Weight (lbs): 279 lbs  Cumulative weight loss (lbs): 24  Weight Loss Percentage: 8.6%    Wt Readings from Last 10 Encounters:   10/20/23 255 lb (115.7 kg)   09/29/23 255 lb 6.4 oz (115.8 kg)   08/28/23 259 lb (117.5 kg)   07/10/23 259 lb (117.5 kg)   06/23/23 259 lb (117.5 kg)   05/22/23 269 lb (122 kg)   04/28/23 269 lb (122 kg)   03/31/23 279 lb (126.6 kg)   03/30/23 279 lb (126.6 kg)   12/08/22 276 lb (125.2 kg)            10/20/2023    Weight Loss Medication History Reviewed With Patient   Which weight loss medications are you currently taking on a regular basis? Naltrexone      Are you having any side effects from the weight loss medication that we have prescribed you? no   If you are having side effects please describe:             10/20/2023    Changes and Difficulties   I have made the following changes to my diet since my last visit:  soda, alcohol, and sweets have come back in.  Still    With regards to my diet, I am still struggling with: Cravings   I have made the following changes to my activity/exercise since my last visit: Paused strength training about 1-2 months ago because she hurt her lower back    With regards to my activity/exercise, I am still struggling with: Back pain   Walks for a hour with the dog.        MEDICATIONS:   Current Outpatient Medications   Medication Sig Dispense Refill    CALCIUM CITRATE PO       CENTRUM OR TABS 1 TABLET DAILY      cod liver oil CAPS capsule       Cyanocobalamin (VITAMIN B 12 PO)       CycloSPORINE (RESTASIS OP) Apply  to eye 2 times daily.      fluticasone (FLONASE) 50 MCG/ACT nasal spray Spray 2 sprays into both nostrils daily      ipratropium (ATROVENT) 0.03 % nasal spray INSTILL 1 SPRAY THREE TIMES DAILY WITH MEALS/EXCERCISE AS NEEDED FOR DRAINAGE      losartan-hydrochlorothiazide (HYZAAR) 50-12.5 MG tablet Take 1 tablet by mouth daily 90 tablet 3    naltrexone (DEPADE/REVIA) 50 MG tablet Take 1  tablet 1-2 hours before biggest craving time daily 90 tablet 1    oxymetazoline (12 HOUR NASAL DECONGESTANT) 0.05 % nasal spray INSTILL 4 SPRAYS TO EACH NOSTRIL IN EVENT OF NOSE BLEED      sodium chloride (OCEAN) 0.65 % nasal spray INSTILL 2 SPRAYS TO EACH NOSTRIL EVERY 1-2 HOURS WHILE AWAKE.      thiamine (VITAMIN B1) 100 MG tablet Take 100 mg by mouth once a week         LABS:  Hemoglobin A1C   Date Value Ref Range Status   04/04/2023 5.4 0.0 - 5.6 % Final     Comment:     Normal <5.7%   Prediabetes 5.7-6.4%    Diabetes 6.5% or higher     Note: Adopted from ADA consensus guidelines.     Vitamin D, Total (25-Hydroxy)   Date Value Ref Range Status   04/04/2023 96 (H) 20 - 75 ug/L Final     TSH   Date Value Ref Range Status   02/27/2015 4.84 mcU/mL Final     Sodium   Date Value Ref Range Status   12/30/2022 141 133 - 144 mmol/L Final   11/23/2015 139 133 - 144 mmol/L Final     Potassium   Date Value Ref Range Status   12/30/2022 4.1 3.4 - 5.3 mmol/L Final   11/23/2015 3.9 3.4 - 5.3 mmol/L Final     Chloride   Date Value Ref Range Status   12/30/2022 106 94 - 109 mmol/L Final   11/23/2015 105 94 - 109 mmol/L Final     Carbon Dioxide   Date Value Ref Range Status   11/23/2015 28 20 - 32 mmol/L Final     Carbon Dioxide (CO2)   Date Value Ref Range Status   12/30/2022 28 20 - 32 mmol/L Final     Anion Gap   Date Value Ref Range Status   12/30/2022 7 3 - 14 mmol/L Final   11/23/2015 6 3 - 14 mmol/L Final     Glucose   Date Value Ref Range Status   12/30/2022 98 70 - 99 mg/dL Final   11/23/2015 75 70 - 99 mg/dL Final     Urea Nitrogen   Date Value Ref Range Status   12/30/2022 13 7 - 30 mg/dL Final   11/23/2015 14 7 - 30 mg/dL Final     Creatinine   Date Value Ref Range Status   12/30/2022 0.88 0.52 - 1.04 mg/dL Final   11/23/2015 0.89 0.52 - 1.04 mg/dL Final     GFR Estimate   Date Value Ref Range Status   12/30/2022 77 >60 mL/min/1.73m2 Final     Comment:     Effective December 21, 2021 eGFRcr in adults is calculated  using the 2021 CKD-EPI creatinine equation which includes age and gender (Shukri et al., NE, DOI: 10.1056/RVYIoe4922164)   11/23/2015 68 >60 mL/min/1.7m2 Final     Comment:     Non  GFR Calc     Calcium   Date Value Ref Range Status   12/30/2022 9.7 8.5 - 10.1 mg/dL Final   11/23/2015 9.3 8.5 - 10.1 mg/dL Final     Bilirubin Total   Date Value Ref Range Status   04/04/2023 0.6 0.2 - 1.3 mg/dL Final   02/27/2015 0.5 mg/dL Final     Alkaline Phosphatase   Date Value Ref Range Status   04/04/2023 117 40 - 150 U/L Final   02/27/2015 55 U/L Final     ALT   Date Value Ref Range Status   04/04/2023 31 0 - 50 U/L Final   08/28/2015 14 U/L Final     AST   Date Value Ref Range Status   04/04/2023 28 0 - 45 U/L Final   08/28/2015 19 U/L Final     Cholesterol   Date Value Ref Range Status   10/08/2021 205 (H) <200 mg/dL Final   01/23/2013 191 0 - 200 mg/dL Final     Comment:     LDL Cholesterol is the primary guide to therapy.   The NCEP recommends further evaluation of: patients with cholesterol greater   than 200 mg/dL if additional risk factors are present, cholesterol greater   than   240 mg/dL, triglycerides greater than 150 mg/dL, or HDL less than 40 mg/dL.     HDL Cholesterol   Date Value Ref Range Status   01/23/2013 86 50 - 110 mg/dL Final     Direct Measure HDL   Date Value Ref Range Status   10/08/2021 108 >=50 mg/dL Final     LDL Cholesterol Calculated   Date Value Ref Range Status   10/08/2021 79 <=100 mg/dL Final   01/23/2013 78 0 - 129 mg/dL Final     Comment:     LDL Cholesterol is the primary guide to therapy: LDL-cholesterol goal in high   risk patients is <100 mg/dL and in very high risk patients is <70 mg/dL.     Triglycerides   Date Value Ref Range Status   10/08/2021 91 <150 mg/dL Final   01/23/2013 136 0 - 150 mg/dL Final     Comment:     Fasting specimen     WBC   Date Value Ref Range Status   11/23/2015 12.4 (H) 4.0 - 11.0 10e9/L Final     WBC Count   Date Value Ref Range Status  "  04/04/2023 7.9 4.0 - 11.0 10e3/uL Final     Hemoglobin   Date Value Ref Range Status   04/04/2023 14.0 11.7 - 15.7 g/dL Final   11/23/2015 13.3 11.7 - 15.7 g/dL Final     Hematocrit   Date Value Ref Range Status   04/04/2023 42.7 35.0 - 47.0 % Final   11/23/2015 41.4 35.0 - 47.0 % Final     MCV   Date Value Ref Range Status   04/04/2023 94 78 - 100 fL Final   11/23/2015 97 78 - 100 fl Final     Platelet Count   Date Value Ref Range Status   04/04/2023 306 150 - 450 10e3/uL Final   11/23/2015 264 150 - 450 10e9/L Final         BP Readings from Last 6 Encounters:   09/29/23 117/78   05/22/23 133/76   03/30/23 (!) 143/80   12/30/22 126/80   12/08/22 (!) 144/85   10/15/21 130/70       Pulse Readings from Last 6 Encounters:   09/29/23 80   05/22/23 63   03/30/23 70   12/30/22 66   12/08/22 95   10/15/21 69       PE:  Ht 5' 7\" (1.702 m)   Wt 255 lb (115.7 kg)   BMI 39.94 kg/m    GENERAL: Healthy, alert and no distress  EYES: Eyes grossly normal to inspection.  No discharge or erythema, or obvious scleral/conjunctival abnormalities.  RESP: No audible wheeze, cough, or visible cyanosis.  No visible retractions or increased work of breathing.    SKIN: Visible skin clear. No significant rash, abnormal pigmentation or lesions.  NEURO: Cranial nerves grossly intact.  Mentation and speech appropriate for age.  PSYCH: Mentation appears normal, affect normal/bright, judgement and insight intact, normal speech and appearance well-groomed.      Sincerely,      Mag Soto PA-C        "

## 2023-10-17 ENCOUNTER — PATIENT OUTREACH (OUTPATIENT)
Dept: GASTROENTEROLOGY | Facility: CLINIC | Age: 58
End: 2023-10-17
Payer: COMMERCIAL

## 2023-10-20 ENCOUNTER — VIRTUAL VISIT (OUTPATIENT)
Dept: SURGERY | Facility: CLINIC | Age: 58
End: 2023-10-20
Payer: COMMERCIAL

## 2023-10-20 VITALS — WEIGHT: 255 LBS | HEIGHT: 67 IN | BODY MASS INDEX: 40.02 KG/M2

## 2023-10-20 DIAGNOSIS — K91.2 MALNUTRITION FOLLOWING GASTROINTESTINAL SURGERY: Primary | ICD-10-CM

## 2023-10-20 DIAGNOSIS — Z98.84 BARIATRIC SURGERY STATUS: ICD-10-CM

## 2023-10-20 DIAGNOSIS — E66.09 CLASS 2 OBESITY DUE TO EXCESS CALORIES WITHOUT SERIOUS COMORBIDITY WITH BODY MASS INDEX (BMI) OF 39.0 TO 39.9 IN ADULT: ICD-10-CM

## 2023-10-20 DIAGNOSIS — E66.812 CLASS 2 OBESITY DUE TO EXCESS CALORIES WITHOUT SERIOUS COMORBIDITY WITH BODY MASS INDEX (BMI) OF 39.0 TO 39.9 IN ADULT: ICD-10-CM

## 2023-10-20 PROCEDURE — 99214 OFFICE O/P EST MOD 30 MIN: CPT | Mod: VID | Performed by: PHYSICIAN ASSISTANT

## 2023-10-20 RX ORDER — NALTREXONE HYDROCHLORIDE 50 MG/1
TABLET, FILM COATED ORAL
Qty: 90 TABLET | Refills: 1 | Status: SHIPPED | OUTPATIENT
Start: 2023-10-20 | End: 2024-01-23

## 2023-10-20 NOTE — ASSESSMENT & PLAN NOTE
Continue taking recommended post-op vitamins.  Labs do be brawn in April except for Vit D which will draw now

## 2023-10-20 NOTE — ASSESSMENT & PLAN NOTE
Patient was congratulated on wt loss success thus far. Healthy habits to assist with further weight loss were discussed. Alta will restart Naltrexone.  She will have LFTs checked.Goals noted in pt instructions.

## 2023-10-20 NOTE — PATIENT INSTRUCTIONS
"To ensure quality you may receive a patient satisfaction survey. The greatest compliment you can give is \"Likely to Recommend.\"    Nice to talk with you today.  Thank you for your trust. Below is the plan discussed.-  AMBER Hathaway      Plan:  Restart Naltrexone 1 tablet in the afternoon/evening.  Can stop if desired for a few days if you decide you want to partake in holiday treats.    Follow up with the dietician     Labs ordered.  Please call [phone] to set up a lab appt.     Goals:  See PT for back injury  Focus on eliminating sweets form the house    FOLLOW-UP:    Please call [phone] to schedule your next visit in 3 months       "

## 2023-11-21 DIAGNOSIS — I10 HYPERTENSION, GOAL BELOW 140/90: ICD-10-CM

## 2023-11-21 RX ORDER — LOSARTAN POTASSIUM AND HYDROCHLOROTHIAZIDE 12.5; 5 MG/1; MG/1
1 TABLET ORAL DAILY
Qty: 60 TABLET | Refills: 0 | Status: SHIPPED | OUTPATIENT
Start: 2023-11-21 | End: 2024-03-27

## 2023-11-22 ENCOUNTER — VIRTUAL VISIT (OUTPATIENT)
Dept: SURGERY | Facility: CLINIC | Age: 58
End: 2023-11-22
Payer: COMMERCIAL

## 2023-11-22 VITALS — HEIGHT: 67 IN | WEIGHT: 259 LBS | BODY MASS INDEX: 40.65 KG/M2

## 2023-11-22 DIAGNOSIS — E66.09 CLASS 2 OBESITY DUE TO EXCESS CALORIES WITHOUT SERIOUS COMORBIDITY WITH BODY MASS INDEX (BMI) OF 39.0 TO 39.9 IN ADULT: Primary | ICD-10-CM

## 2023-11-22 DIAGNOSIS — Z98.84 BARIATRIC SURGERY STATUS: ICD-10-CM

## 2023-11-22 DIAGNOSIS — E66.813 CLASS 3 SEVERE OBESITY DUE TO EXCESS CALORIES WITHOUT SERIOUS COMORBIDITY WITH BODY MASS INDEX (BMI) OF 40.0 TO 44.9 IN ADULT (H): ICD-10-CM

## 2023-11-22 DIAGNOSIS — E66.01 CLASS 3 SEVERE OBESITY DUE TO EXCESS CALORIES WITHOUT SERIOUS COMORBIDITY WITH BODY MASS INDEX (BMI) OF 40.0 TO 44.9 IN ADULT (H): ICD-10-CM

## 2023-11-22 DIAGNOSIS — E66.812 CLASS 2 OBESITY DUE TO EXCESS CALORIES WITHOUT SERIOUS COMORBIDITY WITH BODY MASS INDEX (BMI) OF 39.0 TO 39.9 IN ADULT: Primary | ICD-10-CM

## 2023-11-22 PROCEDURE — 97803 MED NUTRITION INDIV SUBSEQ: CPT | Mod: 95

## 2023-11-22 NOTE — PATIENT INSTRUCTIONS
[facility]!      It was great chatting with you again today! Here's a summary of the goals we discussed:    1. Have a protein supplement every day  - Either a protein shake OR 2 glasses of milk kemar day  - Try having this in the morning    2. Separate fluids from meals by 30 minutes  - This may help with hunger and cravings    3. Be mindful of evening snacking:  - Include protein with all snacks  - Wait 30 minutes between snacks  - Take Naltrexone consistently      Plan on following up in 3 months. This can be scheduled via our call center at . Reach out sooner with any questions or concerns. Have a great day!      Janie Dean RD, LD  Clinical Dietitian

## 2023-11-22 NOTE — PROGRESS NOTES
"Alta is a 58 year old who is being evaluated via a billable video visit.      How would you like to obtain your AVS? MyChart  If the video visit is dropped, the invitation should be resent by: Text to cell phone: [phone]  Will anyone else be joining your video visit? No            Video-Visit Details    Type of service:  Video Visit     Originating Location (pt. Location): Home    Distant Location (provider location):  Off-site  Platform used for Video Visit: [facility]       NUTRITION POST OP APPOINTMENT  DATE OF VISIT: November 22, 2023    Jenae Miller  1965  female  [id number]  58 year old     ASSESSMENT:    REASON FOR VISIT:  Jenae is a 58 year old year old female presents today for 8 year PO nutrition follow-up appointment. Patient is accompanied by self.    DIAGNOSIS:  Status post gastric sleeve surgery.  Obesity Obesity Grade III BMI >40     ANTHROPOMETRICS:  Initial Weight: 276 lbs   Height: 67\"    Current Weight: 259 lbs 0 oz     BMI: Body mass index is 40.57 kg/m .    VITAMINS AND MINERALS:  2 Multivitamin with Minerals (Centrum Adult; lunch)  500 mg Calcium With Vitamin D (AM + dinner)  2000 international unit(s) Vitamin D -  MWF  1000 mcg Vitamin B-12 sublingual - MWF  No iron needed; post-menopausal  Vitamin B1 100 mg 1 time per week    NUTRITION HISTORY:  Breakfast: cereal (shredded wheat w/skim or Fair life) + berries  greek yogurt + blueberries  egg bites  Lunch: leftovers +/- chips  Supper: meat + vegetables or starch  Snacks: [evenings] halloween candy, popcorn, nuts, cookies, chips  Fluids consumed:  Water/enhanced water (48-64oz), coffee (8oz), milk (8-10oz Fair Life Skim every other day) , protein shake (every other day)  Consuming liquid calories: Yes  Protein intake: 60-70 grams/day  Tolerate regular texture food: Yes  Portion size: 1 cup or more  Take 20-30 minutes to consume each meal: Yes   Eat protein foods first: No  Fluids and meals separate by at least 30 minutes: " Sometimes  Chew foods thoroughly: Yes  Tolerating diet: Yes  Drinking high protein supplements: Yes  Consuming snacks per day: grazing in the evenings  Additional Information: Ongoing challenges with evening snacking. Pt will work on taking her Naltrexone more consistently. Working toward weight maintenance throughout holidays. 2        PHYSICAL ACTIVITY:  Inconsistent    DIAGNOSIS:  Previous Nutrition Diagnosis: Altered gastrointestinal function related to alteration in gastrointestinal structure as evidenced by history of gastric sleeve surgery.- no change    Previous goals:  Have a protein drink or 2 glasses of FL milk daily - partially met  Have 3 food groups per meal - not met  Separate fluids and meals by 30 minutes - partially met  Take Calcium per guidelines - not met    Current Nutrition Diagnosis: Altered gastrointestinal function related to alteration in gastrointestinal structure as evidenced by history of gastric sleeve surgery.    INTERVENTION:   Nutrition Prescription: Eat 3 meals a day at regular intervals. Consume 60-90 grams of protein daily. Follow post-surgical vitamin and mineral protocol.  Assessed learning needs and learning preferences.    GOALS:  Take Naltrexone consistently  Have a protein drink or milk daily; in the morning  Separate fluids from meals  Take Calcium per guidelines  Be mindful of evening snacking habits    Follow-Up:   Recommend q3m follow up visits to assist with lifestyle changes or per insurance.  Implementation: Discussed progress toward previous goals; reinforced importance of following bariatric lifestyle changes.    NUTRITION MONITORING AND EVALUATION:  Anticipated compliance: fair-good  Verbalized good understanding.    Follow up: Patient to follow up in 3 months.    TIME SPENT WITH PATIENT:  21 minutes      Janie Dean RD, LD  Clinical Dietitian

## 2023-12-01 ENCOUNTER — LAB (OUTPATIENT)
Dept: LAB | Facility: CLINIC | Age: 58
End: 2023-12-01
Payer: COMMERCIAL

## 2023-12-01 DIAGNOSIS — E66.812 CLASS 2 OBESITY DUE TO EXCESS CALORIES WITHOUT SERIOUS COMORBIDITY WITH BODY MASS INDEX (BMI) OF 39.0 TO 39.9 IN ADULT: ICD-10-CM

## 2023-12-01 DIAGNOSIS — K91.2 MALNUTRITION FOLLOWING GASTROINTESTINAL SURGERY: ICD-10-CM

## 2023-12-01 DIAGNOSIS — E66.09 CLASS 2 OBESITY DUE TO EXCESS CALORIES WITHOUT SERIOUS COMORBIDITY WITH BODY MASS INDEX (BMI) OF 39.0 TO 39.9 IN ADULT: ICD-10-CM

## 2023-12-01 PROCEDURE — 80076 HEPATIC FUNCTION PANEL: CPT

## 2023-12-01 PROCEDURE — 36415 COLL VENOUS BLD VENIPUNCTURE: CPT

## 2023-12-01 PROCEDURE — 82306 VITAMIN D 25 HYDROXY: CPT

## 2023-12-02 LAB
ALBUMIN SERPL BCG-MCNC: 4.3 G/DL (ref 3.5–5.2)
ALP SERPL-CCNC: 101 U/L (ref 40–150)
ALT SERPL W P-5'-P-CCNC: 20 U/L (ref 0–50)
AST SERPL W P-5'-P-CCNC: 28 U/L (ref 0–45)
BILIRUB DIRECT SERPL-MCNC: <0.2 MG/DL (ref 0–0.3)
BILIRUB SERPL-MCNC: 0.6 MG/DL
PROT SERPL-MCNC: 6.7 G/DL (ref 6.4–8.3)
VIT D+METAB SERPL-MCNC: 84 NG/ML (ref 20–50)

## 2024-01-16 NOTE — PROGRESS NOTES
"Alta is a 58 year old who is being evaluated via a billable video visit.      The patient has been notified of following:     \"This video visit will be conducted via a call between you and your physician/provider. We have found that certain health care needs can be provided without the need for an in-person physical exam.  This service lets us provide the care you need with a video conversation.  If a prescription is necessary we can send it directly to your pharmacy.  If lab work is needed we can place an order for that and you can then stop by our lab to have the test done at a later time.    Video visits are billed at different rates depending on your insurance coverage.  Please reach out to your insurance provider with any questions.    If during the course of the call the physician/provider feels a video visit is not appropriate, you will not be charged for this service.\"    Patient has given verbal consent for Video visit? Yes    How would you like to obtain your AVS? MyChart    If the video visit is dropped, the invitation should be resent by: Send to e-mail at: [email or web address]    Will anyone else be joining your video visit? No    I    Video-Visit Details    Type of service:  Video Visit    Originating Location (pt. Location): Home    Distant Location (provider location):   Remote-home    Platform used for Video Visit: TuneWiki    Video Start Time: 11:07 AM    Video End Time:11:21 AM    2024  Return Bariatric Surgery Note    RE: Jenae Miller  MR#: [id number]  : 1965  VISIT DATE: 2024    Dear No primary care provider on file.,    I had the pleasure of seeing your patient, Jenae Miller, in my post-bariatric surgery assessment clinic.    Assessment & Plan   Problem List Items Addressed This Visit       Bariatric surgery status     2015 Gastric Sleeve  July Janet López          Class 3 severe obesity due to excess calories without serious comorbidity with body mass " index (BMI) of 40.0 to 44.9 in adult (H) - Primary     Healthy habits to assist with further weight loss were discussed. Alta will continue naltrexone.  I ordered Zepbound.  If approved we will start this in addition to naltrexone.  If not improved will increase naltrexone to 1-1/2 tablets daily.  Patient has follow-up appointment with dietitian in 1 month.  She will continue her current exercise plan and bariatric principles of eating           Relevant Medications    tirzepatide-Weight Management (ZEPBOUND) 5 MG/0.5ML prefilled pen    tirzepatide-Weight Management (ZEPBOUND) 2.5 MG/0.5ML prefilled pen    naltrexone (DEPADE/REVIA) 50 MG tablet    Postsurgical malabsorption     Continue current vitamins.  Vitamin D level continues to be high we will hold vitamin D           AOM Considerations:  Phentermine:  Stop taking in 2021 due to elevated blood pressure  Topiramate:  SE: Hives  GLP-1:    Candidate  Naltrexone:  Currently on  Wellbutrin:  Stopped due to SE.   Metformin:  Has tried in past  Contrave: No AOM coverage   Qsymia: No AOM coverage         PATIENT INSTRUCTIONS:   I ordered Zepbound.  If approved we will start this in addition to naltrexone.    If not improved will increase naltrexone to 1-1/2 tablets daily.      Start Zepbound  Inject 2.5 mg subcutaneously once weekly for 4 weeks   Then inject 5 mg injected subcutaneously once weekly   If hunger not controlled, MyChart and we can increase your dose before your next appointment    Goals:  She will continue her current exercise plan and bariatric principles of eating    FOLLOW-UP:    Please call [phone] to schedule your next visit in May    30 minutes spent on the date of the encounter doing chart review, history and exam, result review, counseling, developing plan of care, documentation, and further activities as noted        CHIEF COMPLAINT: Post-bariatric surgery follow-up    HISTORY OF PRESENT ILLNESS:      1/16/2024    12:23 PM   Questions  Regarding Prior Weight Loss Surgery Reviewed With Patient   I had the following weight loss procedure Sleeve Gastrectomy   What year was your surgery? 2015   How has your weight changed since your last visit? I have stayed about the same   Do you currently have any of the following Hypertension (high blood pressure)?   Do you have any concerns today? No     INTERVAL HISTORY:  Alta returns for medical weight management follow up.  Last seen on 10/20/2023.  She is status post gastric sleeve from 2015 at [facility] in [place].  We restarted naltrexone.  Cravings have decreased.  Still has a lot of junk food around the house.  Added bariatric labs.  Really did not gain weight until just recently.  Is not having any side effects.     Goals:  See PT for back injury   focus on eliminating sweets from the house  Weight History:      1/16/2024    12:23 PM   --   What is your highest lifetime weight? 292   What is your lowest weight since surgery? (In pounds) 192     Initial Weight (lbs): 279 lbs  Weight: 261 lb (118.4 kg) (pt reported)  Last Visits Weight: 258 lb (117 kg)  Cumulative weight loss (lbs): 18  Weight Loss Percentage: 6.45%    Patient is taking the following bariatric postoperative vitamins:  2 Complete multivitamins with minerals (at different times than calcium)  5000 Int Units of Vitamin D daily   500 mg of calcium  mg of calcium BID  500 mcg of Vitamin B12 sl daily  1000 mcg Vitamin B12 injection monthly  B complex daily   Thiamine weekly  1 Iron/Vit C. daily        1/16/2024    12:23 PM   Questions Regarding Co-Morbidities and Health Concerns Reviewed With Patient   Pre-diabetes Never   Diabetes II Never   High Blood Pressure Improved   High cholesterol Never   Heartburn/Reflux Never   Sleep apnea Improved   PCOS Never   Back pain Stayed the same   Joint pain Stayed the same   Lower leg swelling Stayed the same           1/16/2024    12:23 PM   Eating Habits   How many meals do you eat per day? 3    Do you snack between meals? Yes   How much food are you eating at each meal? 1/2 cup to 1 cup   Are you able to separate your meals and liquids by at least 30 minutes? Sometimes   Are you able to avoid liquid calories? Sometimes           1/16/2024    12:23 PM   Exercise Questions Reviewed With Patient   How often do you exercise? Daily   What is the duration of your exercise (in minutes)? 45 Minutes   What types of exercise do you do? walking    gym membership    weightlifting   What keeps you from being more active? Pain       Social History:      1/16/2024    12:23 PM   --   Are you smoking? No   Are you drinking alcohol? Yes   How much alcohol? 2-3/week         Medications:  Current Outpatient Medications   Medication    CALCIUM CITRATE PO    CENTRUM OR TABS    cod liver oil CAPS capsule    Cyanocobalamin (VITAMIN B 12 PO)    CycloSPORINE (RESTASIS OP)    fluticasone (FLONASE) 50 MCG/ACT nasal spray    ipratropium (ATROVENT) 0.03 % nasal spray    losartan-hydrochlorothiazide (HYZAAR) 50-12.5 MG tablet    naltrexone (DEPADE/REVIA) 50 MG tablet    oxymetazoline (12 HOUR NASAL DECONGESTANT) 0.05 % nasal spray    sodium chloride (OCEAN) 0.65 % nasal spray    thiamine (VITAMIN B1) 100 MG tablet    tirzepatide-Weight Management (ZEPBOUND) 2.5 MG/0.5ML prefilled pen    tirzepatide-Weight Management (ZEPBOUND) 5 MG/0.5ML prefilled pen     No current facility-administered medications for this visit.         1/16/2024    12:23 PM   --   Do you avoid NSAIDs such as (Ibuprofen, Aleve, Naproxen, Advil)? Yes         ROS:  HEENT  H/O glaucoma:            no  Cardiovascular  CAD:                            no  Palpitations:                no  HTN:                            yes  Gastrointestinal  GERD:                         no  Constipation:               no  Gastroparesis:            no  Liver Dz:                      no  H/O Pancreatitis:         no  H/O Gallbladder Dz:    no  Psychiatric  Moods Stable:              "yes  Anxiety:                       Yes, Seen a therapist monthly for the past few years.    Depression:                 no  Bipolar:                        no  H/O ETOH/Drug Use  no  H/O eating disorder:    no  Endocrine  PMH/FMH of MTC or MEN2:  no  Neurologic:  H/O seizures:              no  Headaches:                 no  Memory Impairment:   no    H/O kidney stones:     no  Kidney disease:          No  Denies Stress Incontinence   Current birth control:   Post menopausal.    Skin  No rashes or irritation.   GI:       1/16/2024    12:23 PM   --   Vomiting No   Diarrhea No   Constipation No   Swallowing trouble No   Abdominal pain No   Heartburn No     Skin:       1/16/2024    12:23 PM   BAR RBS ROS - SKIN   Rash in skin folds No     Psych:       1/16/2024    12:23 PM   --   Depression No   Anxiety Yes     Female Only:       1/16/2024    12:23 PM   BAR RBS ROS -    Female only None of the above   Stress urinary incontinence No       LABS/IMAGING/MEDICAL RECORDS REVIEW:   Reviewed      PHYSICAL EXAMINATION:  Ht 5' 7\" (1.702 m)   Wt 261 lb (118.4 kg)   BMI 40.88 kg/m    GENERAL: Healthy, alert and no distress  RESP: No audible wheeze, cough, or visible cyanosis.  No visible retractions or increased work of breathing.    SKIN: Visible skin clear. No significant rash, abnormal pigmentation or lesions.  PSYCH: Mentation appears normal, affect normal/bright, judgement and insight intact    COUNSELING PROVIDED:  We reviewed the important post op bariatric recommendations:  eating 3 meals daily  eating protein first, getting >60gm protein daily  eating slowly, chewing food well  avoiding/limiting calorie containing beverages  avoiding fluids 30 minutes before, during, and after meals  limiting restaurant or cafeteria eating to twice a week or less  Pt reminded to avoid marginal ulcers she should avoid tobacco at all, alcohol in excess, caffeine, and NSAIDS (unless indicated for cardioprotection or otherwise and " opposed by a PPI).  Pt encouraged to establish and maintain a consistent physical activity routine, 6-8 hours of restorative sleep each night and optimal stress management.  Pt counseled on the importance of life long vitamin supplementation and life long follow up.

## 2024-01-19 DIAGNOSIS — I10 HYPERTENSION, GOAL BELOW 140/90: ICD-10-CM

## 2024-01-19 RX ORDER — LOSARTAN POTASSIUM AND HYDROCHLOROTHIAZIDE 12.5; 5 MG/1; MG/1
1 TABLET ORAL DAILY
OUTPATIENT
Start: 2024-01-19

## 2024-01-22 ENCOUNTER — TELEPHONE (OUTPATIENT)
Dept: INTERNAL MEDICINE | Facility: CLINIC | Age: 59
End: 2024-01-22
Payer: COMMERCIAL

## 2024-01-22 DIAGNOSIS — I10 HYPERTENSION, GOAL BELOW 140/90: ICD-10-CM

## 2024-01-22 RX ORDER — LOSARTAN POTASSIUM AND HYDROCHLOROTHIAZIDE 12.5; 5 MG/1; MG/1
1 TABLET ORAL DAILY
OUTPATIENT
Start: 2024-01-22

## 2024-01-23 ENCOUNTER — VIRTUAL VISIT (OUTPATIENT)
Dept: SURGERY | Facility: CLINIC | Age: 59
End: 2024-01-23
Payer: COMMERCIAL

## 2024-01-23 ENCOUNTER — TELEPHONE (OUTPATIENT)
Dept: SURGERY | Facility: CLINIC | Age: 59
End: 2024-01-23

## 2024-01-23 VITALS — HEIGHT: 67 IN | BODY MASS INDEX: 40.97 KG/M2 | WEIGHT: 261 LBS

## 2024-01-23 DIAGNOSIS — E66.01 CLASS 3 SEVERE OBESITY DUE TO EXCESS CALORIES WITHOUT SERIOUS COMORBIDITY WITH BODY MASS INDEX (BMI) OF 40.0 TO 44.9 IN ADULT (H): Primary | ICD-10-CM

## 2024-01-23 DIAGNOSIS — E66.01 CLASS 3 SEVERE OBESITY DUE TO EXCESS CALORIES WITHOUT SERIOUS COMORBIDITY WITH BODY MASS INDEX (BMI) OF 40.0 TO 44.9 IN ADULT (H): ICD-10-CM

## 2024-01-23 DIAGNOSIS — K91.2 POSTSURGICAL MALABSORPTION: ICD-10-CM

## 2024-01-23 DIAGNOSIS — E66.813 CLASS 3 SEVERE OBESITY DUE TO EXCESS CALORIES WITHOUT SERIOUS COMORBIDITY WITH BODY MASS INDEX (BMI) OF 40.0 TO 44.9 IN ADULT (H): ICD-10-CM

## 2024-01-23 DIAGNOSIS — E66.813 CLASS 3 SEVERE OBESITY DUE TO EXCESS CALORIES WITHOUT SERIOUS COMORBIDITY WITH BODY MASS INDEX (BMI) OF 40.0 TO 44.9 IN ADULT (H): Primary | ICD-10-CM

## 2024-01-23 DIAGNOSIS — Z98.84 BARIATRIC SURGERY STATUS: ICD-10-CM

## 2024-01-23 PROCEDURE — 99214 OFFICE O/P EST MOD 30 MIN: CPT | Mod: 95 | Performed by: PHYSICIAN ASSISTANT

## 2024-01-23 RX ORDER — NALTREXONE HYDROCHLORIDE 50 MG/1
TABLET, FILM COATED ORAL
Qty: 90 TABLET | Refills: 1 | Status: SHIPPED | OUTPATIENT
Start: 2024-01-23 | End: 2024-02-05

## 2024-01-23 NOTE — TELEPHONE ENCOUNTER
"Prior Authorization Retail Medication Request    Medication/Dose:   tirzepatide-Weight Management (ZEPBOUND) 2.5 MG/0.5ML prefilled pen 2 mL 0 1/23/2024 -- No   Sig - Route: Inject 0.5 mLs (2.5 mg) Subcutaneous every 7 days      Disp Refills Start End DOMINIQUE   tirzepatide-Weight Management (ZEPBOUND) 5 MG/0.5ML prefilled pen 2 mL 2 1/23/2024 -- No   Sig - Route: Inject 0.5 mLs (5 mg) Subcutaneous every 7 days     ICD code (if different than what is on RX):  [E66.01, Z68.41]    Previously Tried and Failed:  S/P gastric sleeve 2015  Rationale: unable to take Wellbutrin due to side effects    1/23/24:  Ht - 5'7\"  Body mass index is 40.88 kg/m .   Current Weight:  261* lb (118.4 kg)       Insurance Name: [facility] OUT OF STATE    Insurance ID:  [id number]       Pharmacy Information (if different than what is on RX)  [id number] [place] - [address]     Phone:  [phone]     "

## 2024-01-23 NOTE — ASSESSMENT & PLAN NOTE
Healthy habits to assist with further weight loss were discussed. Alta will continue naltrexone.  I ordered Zepbound.  If approved we will start this in addition to naltrexone.  If not improved will increase naltrexone to 1-1/2 tablets daily.  Patient has follow-up appointment with dietitian in 1 month.  She will continue her current exercise plan and bariatric principles of eating

## 2024-01-23 NOTE — PATIENT INSTRUCTIONS
"To ensure quality you may receive a patient satisfaction survey. The greatest compliment you can give is \"Likely to Recommend.\"    Nice to talk with you today.  Thank you for your trust. Below is the plan discussed.-  AMBER Hathaway      Plan:   I ordered Zepbound.  If approved we will start this in addition to naltrexone.    If not improved will increase naltrexone to 1-1/2 tablets daily.      Start Zepbound  Inject 2.5 mg subcutaneously once weekly for 4 weeks   Then inject 5 mg injected subcutaneously once weekly   If hunger not controlled, MyCalicjat and we can increase your dose before your next appointment    Goals:  She will continue her current exercise plan and bariatric principles of eating    FOLLOW-UP:    Please call [phone] to schedule your next visit in May        Zepbound (Tirzepatide)    Zepbound (Tirzepatide): is an injectable prescription medicine recently FDA approved for adults with obesity or overweight with an additional condition affected by their weight.      It is given as a shot once a week. It activates the body's receptors for GIP (glucose-dependent insulinotropic polypeptide) and GLP-1 (glucagon-like peptide-1) receptor, two naturally occurring hormones that help tell the brain that you are full. It also works is by slowing down how quickly food leaves your stomach.     You will start to feel bartholomew more quickly, notice portion changes and eat less often between meals.  Patients are advised to eat slowly and purposefully. Give yourself less portions. You may find after starting this medication you have a new point of fullness. Maintain consistent eating schedule with meals +/- snacks and get in good hydration.    Dosing:  Initial dose: 2.5 mg injected subcutaneously once weekly for 4 weeks  Further dose changes can include: increase to 5 mg once weekly for 4 weeks, then 7.5 mg once weekly for 4 weeks, then 10 mg once weekly for 4 weeks then 12.5 mg once weekly for 4 weeks, then 15 mg " (maximum dose) once weekly.    Dose changes are based on how you are tolerating the current dose, what benefits you are seeing at the current dose and if improvement in effectiveness of the drug is needed. The goal is to find the dose that works best for you with the least amount of side effects. You may find you reach this at a lower dose than the maximum dose.     Side effects: Common side effects include nausea, Heartburn,diarrhea, constipation. This is worse when starting the medication and with dose dose escalation.  It does improve with time. Stay adequately hydrated and eat small portions to decrease the risk of side effects.     The risk of pancreatitis (inflammation of the pancreas) has been associated with this type of medication, but is very rare.  If you have had pancreatitis in the past, this medication may not be for you. If you experience persistent severe abdominal pain (sometimes radiating to the back potentially accompanied by vomiting and have a fever), stop the medication and contact your provider immediately for assessment. They will do a blood test to check for pancreatitis.       Caution:   Tirzepatide (Zepbound, Mounjaro) May decrease effectiveness of your oral birth control while starting and with dose adjustments.  Use backup forms of birth control if necessary.      For any questions or concerns please send a [employer] message to our team or call our weight management call center at [phone] during regular business hours.

## 2024-02-02 ENCOUNTER — OFFICE VISIT (OUTPATIENT)
Dept: VASCULAR SURGERY | Facility: CLINIC | Age: 59
End: 2024-02-02
Payer: COMMERCIAL

## 2024-02-02 DIAGNOSIS — I78.1 SPIDER TELANGIECTASIS OF SKIN: Primary | ICD-10-CM

## 2024-02-02 DIAGNOSIS — I83.92 ASYMPTOMATIC VARICOSE VEINS OF LEFT LOWER EXTREMITY: ICD-10-CM

## 2024-02-02 PROCEDURE — 36468 NJX SCLRSNT SPIDER VEINS: CPT | Performed by: SURGERY

## 2024-02-02 PROCEDURE — S9999 SALES TAX: HCPCS | Performed by: SURGERY

## 2024-02-02 NOTE — NURSING NOTE
Patient Reported symptoms:    Right leg   Heaviness Most of the time   Achiness Most of the time   Swelling Most of the time   Throbbing Some of the time   Itching None of the time   Appearance Moderately noticeable   Impact on work/activities Symptoms but full able to participate    Left Leg   Heaviness Most of the time   Achiness Most of the time   Swelling Most of the time   Throbbing Some of the time   Itching None of the time   Appearance Moderately noticeable   Impact on work/activities Symptoms but full able to participate

## 2024-02-02 NOTE — LETTER
2/2/2024         RE: Jenae Miller  [address] [place] [address]        Dear Colleague,    Thank you for referring your patient, Jenae Miller, to the [facility] [place]. Please see a copy of my visit note below.        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  The patient voiced understanding and wished to proceed.  Informed consent was obtained.    The patient stood and showed me a varicosity coursing on the distal posterior medial left thigh, across the medial patella area.  This vein measures about 4 mm in diameter and will require ultrasound-guided sclerotherapy to treat.  We were not prepared to do this today we will plan to do this at her next treatment.  She understands that the next treatment will be an expanded session charge    I donned the Syris headlight and injected clusters of telangiectasias over the distal lateral thighs, posterolateral legs, right greater than left and reticular veins and telangiectasias extending into the popliteal fossas bilaterally.  I used a total of 3 syringes of foam.    Had the patient perform ankle pumps.  We cleaned her legs, had her don compression we will see her in about 6 weeks in follow-up.  Sclerotherapy    Date/Time: 2/2/2024 9:39 AM    Performed by: Mychal Smith MD  Authorized by: Mychal Smith MD    Time out: Immediately prior to the procedure a time out was called    Type:  Cosmetic  Session:  Full  Procedure side:  Bilateral  Solution/Amount:  .5 POLIDOCANOL  Syringes:  3  Patient tolerance:  Patient tolerated the procedure well with no  immediate complications  Wrap/Hose:  Hose      C Bailey Smith MD    Dictated using Dragon voice recognition software which may result in transcription errors      Again, thank you for allowing me to participate in the care of your patient.        Sincerely,        Mychal Smith MD   No

## 2024-02-02 NOTE — TELEPHONE ENCOUNTER
PRIOR AUTHORIZATION DENIED    Medication: TIRZEPATIDE-WEIGHT MANAGEMENT 2.5 MG/0.5ML SC SOAJ  Insurance Company: [employer] ([facility]) - Phone [phone] Fax [phone]  Denial Date: 2/2/2024  Denial Reason(s): Plan exclusion, no path to coverage.    Appeal Information:     Patient Notified: No, care team must notify on denials.

## 2024-02-02 NOTE — TELEPHONE ENCOUNTER
Note: Due to record-high volumes, our turn-around is time taking longer than normal . We are currently 8 days behind in the pools.   We are working diligently to submit all requests in a timely manner and in the order they are received. Please only flag true urgent requests as high priority to the pool at this time.   If you have questions - please send a note/message in the active PA encounter and send back to the RPPA (Retail Pharmacy Prior Authorization) team [[id number]].    If you have more specific questions about our process please reach out to our supervisor Gabbie Hannah.   Thank you!     PA Initiation    Medication: TIRZEPATIDE-WEIGHT MANAGEMENT 2.5 MG/0.5ML SC SOAJ  Insurance Company: [employer] ([facility]) - Phone [phone] Fax [phone]  Pharmacy Filling the Rx: [id number] [place] [place] - [address]  Filling Pharmacy Phone: [phone]  Filling Pharmacy Fax: [phone]  Start Date: 2/2/2024

## 2024-02-02 NOTE — PROGRESS NOTES
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  The patient voiced understanding and wished to proceed.  Informed consent was obtained.    The patient stood and showed me a varicosity coursing on the distal posterior medial left thigh, across the medial patella area.  This vein measures about 4 mm in diameter and will require ultrasound-guided sclerotherapy to treat.  We were not prepared to do this today we will plan to do this at her next treatment.  She understands that the next treatment will be an expanded session charge    I donned the Syris headlight and injected clusters of telangiectasias over the distal lateral thighs, posterolateral legs, right greater than left and reticular veins and telangiectasias extending into the popliteal fossas bilaterally.  I used a total of 3 syringes of foam.    Had the patient perform ankle pumps.  We cleaned her legs, had her don compression we will see her in about 6 weeks in follow-up.  Sclerotherapy    Date/Time: 2/2/2024 9:39 AM    Performed by: Mychal Smith MD  Authorized by: Mychal Smith MD    Time out: Immediately prior to the procedure a time out was called    Type:  Cosmetic  Session:  Full  Procedure side:  Bilateral  Solution/Amount:  .5 POLIDOCANOL  Syringes:  3  Patient tolerance:  Patient tolerated the procedure well with no immediate complications  Wrap/Hose:  David Smith MD    Dictated using Dragon voice recognition software which may result in transcription errors

## 2024-02-05 RX ORDER — NALTREXONE HYDROCHLORIDE 50 MG/1
TABLET, FILM COATED ORAL
Qty: 135 TABLET | Refills: 1 | Status: SHIPPED | OUTPATIENT
Start: 2024-02-05 | End: 2024-05-23

## 2024-02-16 ENCOUNTER — VIRTUAL VISIT (OUTPATIENT)
Dept: SURGERY | Facility: CLINIC | Age: 59
End: 2024-02-16
Payer: COMMERCIAL

## 2024-02-16 VITALS — BODY MASS INDEX: 41.44 KG/M2 | WEIGHT: 264 LBS | HEIGHT: 67 IN

## 2024-02-16 DIAGNOSIS — E66.813 CLASS 3 SEVERE OBESITY DUE TO EXCESS CALORIES WITHOUT SERIOUS COMORBIDITY WITH BODY MASS INDEX (BMI) OF 40.0 TO 44.9 IN ADULT (H): Primary | ICD-10-CM

## 2024-02-16 DIAGNOSIS — Z98.84 BARIATRIC SURGERY STATUS: ICD-10-CM

## 2024-02-16 DIAGNOSIS — E66.01 CLASS 3 SEVERE OBESITY DUE TO EXCESS CALORIES WITHOUT SERIOUS COMORBIDITY WITH BODY MASS INDEX (BMI) OF 40.0 TO 44.9 IN ADULT (H): Primary | ICD-10-CM

## 2024-02-16 PROCEDURE — 97803 MED NUTRITION INDIV SUBSEQ: CPT | Mod: 95

## 2024-02-16 NOTE — PROGRESS NOTES
"Alta is a 58 year old who is being evaluated via a billable video visit.      The patient has been notified of following:     \"This video visit will be conducted via a call between you and your physician/provider. We have found that certain health care needs can be provided without the need for an in-person physical exam.  This service lets us provide the care you need with a video conversation.  If a prescription is necessary we can send it directly to your pharmacy.  If lab work is needed we can place an order for that and you can then stop by our lab to have the test done at a later time.    Video visits are billed at different rates depending on your insurance coverage.  Please reach out to your insurance provider with any questions.    If during the course of the call the physician/provider feels a video visit is not appropriate, you will not be charged for this service.\"    Patient has given verbal consent for Video visit? Yes    How would you like to obtain your AVS? MyChart    If the video visit is dropped, the invitation should be resent by: Text to cell phone: [phone]    Will anyone else be joining your video visit? No    I    Video-Visit Details    Type of service:  Video Visit    Originating Location (pt. Location): Home    Distant Location (provider location):   Off-Site - Provider Home Office    Platform used for Video Visit: [employer]    Video Start Time: 11:32am    Video End Time: 11:55 am    NUTRITION POST OP APPOINTMENT  DATE OF VISIT: February 16, 2024    Jenae Miller  1965  female  [id number]  58 year old     ASSESSMENT:    REASON FOR VISIT:  Jenae is a 58 year old year old female presents today for 8 year PO nutrition follow-up appointment. Patient is accompanied by self.    DIAGNOSIS:  Status post gastric sleeve surgery.  Obesity Obesity Grade III BMI >40     ANTHROPOMETRICS:  Initial Weight: 276 lbs   Height:  67\"  Current Weight: 264 lbs 0 oz    BMI:  Body mass index is 41.35 " kg/m .    WEIGHT LOSS MEDICATIONS:  Naltrexone    VITAMINS AND MINERALS:  2 Multivitamin with Minerals (Centrum Adult; lunch)  500 mg Calcium With Vitamin D (AM + dinner)  2000 international unit(s) Vitamin D -  MWF  1000 mcg Vitamin B-12 sublingual - MWF  No iron needed; post-menopausal  Vitamin B1 100 mg 1 time per week    Preservation ARED providing additional Vitamins E and C as well as Zinc and Copper. Pt will discuss with her ophthalmologist whether she needs this specific supplement vs lutein alone. Will send provider message to monitor zinc and copper levels.       NUTRITION HISTORY:  Breakfast: egg/sausage sandwich on bagel or ciabatta   Lunch: leftovers (ie empanadas) + vegetables  Supper: meat + vegetables + starch  Snacks: [afternoon] chips  [evenings] chips, cookies, chocolate/candy  Fluids consumed: Water (16oz), enhanced water (16-32oz), coffee (8oz), tea (occasional), protein shakes or milk (1 per day)   Consuming liquid calories: Yes  Protein intake: 60-80 grams/day  Tolerate regular texture food: Yes  Portion size: 1 cup or more  Take 20-30 minutes to consume each meal: Yes   Eat protein foods first: Yes  Fluids and meals separate by at least 30 minutes: Sometimes  Chew foods thoroughly: Yes  Tolerating diet: Yes  Drinking high protein supplements: Yes  Consuming snacks per day: 1 + evening grazing  Additional Information: Ongoing challenges with evening snacking. Pt shares this is well controlled if she says busy; reviewed non-food alternative activities.         PHYSICAL ACTIVITY:  Type: walking dog  Frequency (days per week): most days  Duration (min): 90    DIAGNOSIS:  Previous Nutrition Diagnosis: Altered gastrointestinal function related to alteration in gastrointestinal structure as evidenced by history of gastric sleeve surgery.- no change    Previous goals:  Take Naltrexone consistently - not met  Have a protein drink or milk daily; in the morning - partially met  Separate fluids from  meals - improving  Take Calcium per guidelines - not met  Be mindful of evening snacking habits - not met    Current Nutrition Diagnosis: Altered gastrointestinal function related to alteration in gastrointestinal structure as evidenced by history of gastric sleeve surgery.    INTERVENTION:   Nutrition Prescription: Eat 3 meals a day at regular intervals. Consume 60-90 grams of protein daily. Follow post-surgical vitamin and mineral protocol.  Assessed learning needs and learning preferences.    GOALS:  Practice non-food alternatives to evening snacking  Take Calcium per guidelines  Consider milk/protein drink both morning and evening      Follow-Up:   Recommend q3m follow up visits to assist with lifestyle changes or per insurance.  Implementation: Discussed progress toward previous goals; reinforced importance of following bariatric lifestyle changes.    NUTRITION MONITORING AND EVALUATION:  Anticipated compliance: fair-good  Verbalized fair-good understanding.    Follow up: Patient to follow up in 3 months.    TIME SPENT WITH PATIENT:  23 minutes        Janie Dean RD, LD  Clinical Dietitian

## 2024-02-16 NOTE — PATIENT INSTRUCTIONS
[facility]!      It was great chatting with you again today! Here's a summary of the goals we discussed:    1. Practice non-food alternative activities to evening snacking  - Examples: crossword puzzles, arts/crafts/hobbies, games, projects, journaling, reading, adult coloring books, meditation/deep breathing, etc    2. Consider having milk or protein drink both in the evening and in the morning  - Example: 1 glass of milk in the morning + in the evening, OR 1/2 protein shake in the morning + evening  - Sometimes getting a bit more nutrition in the first half of the day helps with evening cravings    3. Calcium:  - Here are some brands that have little or no Vitamin D  TUMS  Celebrate  Solaray  Bariatric Fusion  - If you'd like to do twice daily dosing, consider a 600mg strength      Plan on following up in 3 months. This can be scheduled via our call center at . Reach out sooner with any questions or concerns. Have a great day!      Janie Dean RD, LD  Clinical Dietitian

## 2024-03-21 ENCOUNTER — OFFICE VISIT (OUTPATIENT)
Dept: VASCULAR SURGERY | Facility: CLINIC | Age: 59
End: 2024-03-21
Payer: COMMERCIAL

## 2024-03-21 DIAGNOSIS — I83.93 ASYMPTOMATIC VARICOSE VEINS OF BOTH LOWER EXTREMITIES: Primary | ICD-10-CM

## 2024-03-21 PROCEDURE — S9999 SALES TAX: HCPCS | Performed by: SURGERY

## 2024-03-21 PROCEDURE — 36468 NJX SCLRSNT SPIDER VEINS: CPT | Performed by: SURGERY

## 2024-03-21 NOTE — PROGRESS NOTES
Vein Clinic Sclerotherapy Note     Indications:  Recurrent, asymptomatic bilateral lower extremity varicose veins, left greater than right with a few residual spider telangiectasias of the skin     Procedure:  1.  Ultrasound-guided, cosmetic sclerotherapy multiple left and right lower extremity varicose veins  2.  Great vision sclerotherapy right medial ankle varicose vein and residual, bilateral lower extremity spider telangiectasias of the skin     Procedure description  Details of procedure including risks of allergic reaction, deep vein thrombosis, ulceration, superficial thrombophlebitis and hyperpigmentation were discussed.  The patient voiced understanding and wished to proceed.  Informed consent was obtained.    I imaged her left medial thigh noting that a large varicosity coursing down the thigh originated from the left great saphenous vein in the proximal third of the thigh.  I isolated this tributary in the proximal third of the thigh with ultrasound, directed a 27-gauge needle into it and injected 1% polidocanol foam.  I then injected this vein incrementally near the mid thigh and then on the anterolateral left thigh as well as on the proximal medial left calf, mid medial left leg and the distal posterior medial left leg using 1% polidocanol foam under ultrasound guidance.  Finally, there was a small vein on the lateral left leg which I injected with 1% polidocanol foam as well.    I then imaged the anterolateral mid right leg noting a sizable  which communicated with a varicosity that was visible on the mid anterolateral leg, of concern to the patient.  I was careful to traced this vein at least 8 cm distal to its origin from the  using ultrasound.  I then directed a 27-gauge needle into the vein on ultrasound guidance and noted the 1% polidocanol foam.  Used a total of 12 mL of the foam.    I donned the Syris headlight and injected spider telangiectasias of the medial and  lateral thighs as well as the medial and lateral legs.  I used a total of 6 mL of 0.5% polidocanol foam.  Had the patient perform ankle pumps.    We cleaned her legs, had her don compression that had her walk for 10 minutes.  She will return in approximate 6 weeks in follow-up for a direct vision check for trapped blood and possible sclerotherapy.    Sclerotherapy    Date/Time: 3/21/2024 2:12 PM    Performed by: Mychal Smith MD  Authorized by: Mychal Smith MD    Time out: Immediately prior to the procedure a time out was called    Type:  Cosmetic  Session:  Expanded  Procedure side:  Bilateral  Solution/Amount:  1% POLIDOCANOL and .5 POLIDOCANOL  Syringes:  Four 1% polidocanol foam; 2- 0.5% polidocanol foam  Patient tolerance:  Patient tolerated the procedure well with no immediate complications  Wrap/Hose:  David Smith MD    Dictated using Dragon voice recognition software which may result in transcription errors

## 2024-03-21 NOTE — LETTER
3/21/2024         RE: Jenae Miller  [address] [place] [address]        Dear Colleague,    Thank you for referring your patient, Jenae Miller, to the [facility] [place]. Please see a copy of my visit note below.        Vein Clinic Sclerotherapy Note     Indications:  Recurrent, asymptomatic bilateral lower extremity varicose veins, left greater than right with a few residual spider telangiectasias of the skin     Procedure:  1.  Ultrasound-guided, cosmetic sclerotherapy multiple left and right lower extremity varicose veins  2.  Great vision sclerotherapy right medial ankle varicose vein and residual, bilateral lower extremity spider telangiectasias of the skin     Procedure description  Details of procedure including risks of allergic reaction, deep vein thrombosis, ulceration, superficial thrombophlebitis and hyperpigmentation were discussed.  The patient voiced understanding and wished to proceed.  Informed consent was obtained.    I imaged her left medial thigh noting that a large varicosity coursing down the thigh originated from the left great saphenous vein in the proximal third of the thigh.  I isolated this tributary in the proximal third of the thigh with ultrasound, directed a 27-gauge needle into it and injected 1% polidocanol foam.  I then injected this vein incrementally near the mid thigh and then on the anterolateral left thigh as well as on the proximal medial left calf, mid medial left leg and the distal posterior medial left leg using 1% polidocanol foam under ultrasound guidance.  Finally, there was a small vein on the lateral left leg which I injected with 1% polidocanol foam as well.    I then imaged the anterolateral mid right leg noting a sizable  which communicated with a varicosity that was visible on the mid anterolateral leg, of concern to the patient.  I was careful to traced this vein at least 8 cm distal to its  origin from the  using ultrasound.  I then directed a 27-gauge needle into the vein on ultrasound guidance and noted the 1% polidocanol foam.  Used a total of 12 mL of the foam.    I donned the Syris headlight and injected spider telangiectasias of the medial and lateral thighs as well as the medial and lateral legs.  I used a total of 6 mL of 0.5% polidocanol foam.  Had the patient perform ankle pumps.    We cleaned her legs, had her don compression that had her walk for 10 minutes.  She will return in approximate 6 weeks in follow-up for a direct vision check for trapped blood and possible sclerotherapy.    Sclerotherapy    Date/Time: 3/21/2024 2:12 PM    Performed by: Mychal Smith MD  Authorized by: Mychal Smith MD    Time out: Immediately prior to the procedure a time out was called    Type:  Cosmetic  Session:  Expanded  Procedure side:  Bilateral  Solution/Amount:  1% POLIDOCANOL and .5 POLIDOCANOL  Syringes:  Four 1% polidocanol foam; 2- 0.5% polidocanol foam  Patient tolerance:  Patient tolerated the procedure well with no immediate complications  Wrap/Hose:  David Smith MD    Dictated using Dragon voice recognition software which may result in transcription errors      Again, thank you for allowing me to participate in the care of your patient.        Sincerely,        Mychal Smith MD

## 2024-03-27 DIAGNOSIS — I10 HYPERTENSION, GOAL BELOW 140/90: ICD-10-CM

## 2024-03-27 NOTE — TELEPHONE ENCOUNTER
Medication Question or Refill    Contacts         Type Contact Phone/Fax    03/27/2024 12:53 PM CDT Phone (Incoming) Alta Miller (Self) [phone] ()            What medication are you calling about (include dose and sig)?:   losartan-hydrochlorothiazide (HYZAAR) 50-12.5 MG tablet     Preferred Pharmacy:   [place] [address] [place] [facility] - [place], [place] - [address]  [address] [facility] [address]  Phone: [phone] Fax: [phone]      Controlled Substance Agreement on file:   CSA -- Patient Level:    CSA: None found at the patient level.       Who prescribed the medication?: PT    Do you need a refill? Yes    When did you use the medication last? 03/27, has about a weeks worth left     Patient offered an appointment? Yes: 07/08/2024    Do you have any questions or concerns?  Yes: Used to see Dr. Cook but she's no longer with  [facility], PT shruthi not till 07/08 wondering if she can get 2 refills to last her until appt      Could we send this information to you in [employer] or would you prefer to receive a phone call?:   Patient would prefer a phone call   Okay to leave a detailed message?: Yes at Cell number on file:    Telephone Information:   Mobile [phone]

## 2024-03-27 NOTE — TELEPHONE ENCOUNTER
Pending Prescriptions:                       Disp   Refills    losartan-hydrochlorothiazide (HYZAAR) 50-*60 tab*0            Sig: Take 1 tablet by mouth daily    Physical scheduled 7/8/24  Would like refills until her appointment.  Harriet Edwards CMA

## 2024-03-28 RX ORDER — LOSARTAN POTASSIUM AND HYDROCHLOROTHIAZIDE 12.5; 5 MG/1; MG/1
1 TABLET ORAL DAILY
Qty: 90 TABLET | Refills: 0 | Status: SHIPPED | OUTPATIENT
Start: 2024-03-28 | End: 2024-06-24

## 2024-04-09 ENCOUNTER — TELEPHONE (OUTPATIENT)
Dept: VASCULAR SURGERY | Facility: CLINIC | Age: 59
End: 2024-04-09
Payer: COMMERCIAL

## 2024-04-09 NOTE — TELEPHONE ENCOUNTER
I called and spoke with the pt. I let her know that per Dr Smith's note this spot is right where he injected a large varicose vein under ultrasound guidance. This redness/firmness/tenderness and swelling is an expected finding and also a positive finding as it indicates that the large vein is becoming inflamed as it closes down.    Pt expressed understanding. She is not in much pain, she just wanted us to be aware.    I let her know that this symptom can be helped by wearing her compression hose and taking ibuprofen.    The patient agrees to these measures.    Pt had no further questions.    Lilliana Adame RN

## 2024-04-09 NOTE — TELEPHONE ENCOUNTER
Pt saw CPN for US guided sclero on 3/21/24.  At that time the veins was somewhat raised and red right after sclero and then resolved.   Today she said the raised red area (size of a quarter) returned about the size of a quarter in her left medial mid thigh. It is slightly painful to the touch.   She would like direction on what to do- if she needs to rub it, heat, etc.  Please call her back.

## 2024-05-03 ENCOUNTER — OFFICE VISIT (OUTPATIENT)
Dept: VASCULAR SURGERY | Facility: CLINIC | Age: 59
End: 2024-05-03
Payer: COMMERCIAL

## 2024-05-03 DIAGNOSIS — I83.93 ASYMPTOMATIC VARICOSE VEINS OF BOTH LOWER EXTREMITIES: Primary | ICD-10-CM

## 2024-05-03 DIAGNOSIS — I78.1 SPIDER TELANGIECTASIS OF SKIN: ICD-10-CM

## 2024-05-03 PROCEDURE — 99207 PR VEINSOLUTIONS NO CHARGE VISIT: CPT | Performed by: SURGERY

## 2024-05-03 NOTE — PROGRESS NOTES
[facility] returns in follow-up of [place] [facility] [place] office note    Jenae Miller 1 session of cosmetic ultrasound-guided sclerotherapy of varicosities in her left medial thigh extending distally and laterally lateral to left knee and onto the lateral left leg as well as on the medial left calf.  We also injected varicosities on the lateral right leg under ultrasound guidance.  We also performed 2 sessions of direct vision sclerotherapy of cosmetic spider telangiectasias.    She is pleased with the results.  She states she did get some tenderness and firmness along the anterior left leg where we injected the varicosity in this location but that this is resolved.  She has a couple of areas of telangiectasias which are of minimal concern to her at this time but feels she may return in the fall or winter to have this treated.    I appreciate no trapped blood.    Assessment  Doing well with good results with the above treatment.    Plan  Return on an as-needed basis    JENNIFER Smith MD, FACS    Dictated using Dragon voice recognition software which may result in transcription errors

## 2024-05-03 NOTE — LETTER
5/3/2024         RE: Jenae Miller  [address] [place]  [place] [address]        Dear Colleague,    Thank you for referring your patient, Jenae Miller, to the [facility] [place]. Please see a copy of my visit note below.    [facility] returns in follow-up of [facility] [place] office note    Jenae Miller 1 session of cosmetic ultrasound-guided sclerotherapy of varicosities in her left medial thigh extending distally and laterally lateral to left knee and onto the lateral left leg as well as on the medial left calf.  We also injected varicosities on the lateral right leg under ultrasound guidance.  We also performed 2 sessions of direct vision sclerotherapy of cosmetic spider telangiectasias.    She is pleased with the results.  She states she did get some tenderness and firmness along the anterior left leg where we injected the varicosity in this location but that this is resolved.  She has a couple of areas of telangiectasias which are of minimal concern to her at this time but feels she may return in the fall or winter to have this treated.    I appreciate no trapped blood.    Assessment  Doing well with good results with the above treatment.    Plan  Return on an as-needed basis    JENNIFER Smith MD, FACS    Dictated using Dragon voice recognition software which may result in transcription errors        Again, thank you for allowing me to participate in the care of your patient.        Sincerely,        Mychal Smith MD

## 2024-05-16 NOTE — PROGRESS NOTES
"Alta is a 58 year old who is being evaluated via a billable video visit.      The patient has been notified of following:     \"This video visit will be conducted via a call between you and your physician/provider. We have found that certain health care needs can be provided without the need for an in-person physical exam.  This service lets us provide the care you need with a video conversation.  If a prescription is necessary we can send it directly to your pharmacy.  If lab work is needed we can place an order for that and you can then stop by our lab to have the test done at a later time.    Video visits are billed at different rates depending on your insurance coverage.  Please reach out to your insurance provider with any questions.    If during the course of the call the physician/provider feels a video visit is not appropriate, you will not be charged for this service.\"    Patient has given verbal consent for Video visit? Yes    How would you like to obtain your AVS? MyChart    If the video visit is dropped, the invitation should be resent by: Text to cell phone: [phone]    Will anyone else be joining your video visit? No    I    Video-Visit Details    Type of service:  Video Visit    Originating Location (pt. Location): Home    Distant Location (provider location):   Off-Site - Provider Home Office    Platform used for Video Visit: Year Up    Video Start Time: 11:09 AM    Video End Time:11:39 AM    Return Bariatric Surgery Note    RE: Jenae Miller  MR#: [id number]  : 1965  VISIT DATE: 2024    Dear No Ref-Primary, Physician,    I had the pleasure of seeing your patient, Jenae Miller, in my post-bariatric surgery assessment clinic.    Assessment & Plan   Problem List Items Addressed This Visit       Bariatric surgery status     2015 Gastric Sleeve  July Janet López         Class 3 severe obesity due to excess calories without serious comorbidity with body mass index " (BMI) of 40.0 to 44.9 in adult (H) - Primary     Patient was congratulated on wt loss success thus far. Healthy habits to assist with further weight loss were discussed. Alta will continue naltrexone but take consistently.  We discussed long-term effects of this medication and safety profile.  She will see [facility] pharmacist in July or early August to see if weight continues to drop.  If not we may consider adding metformin.  This was discussed today.  Patient has some reservation and would like to talk to the [facility] pharmacist in more detail.           Relevant Medications    naltrexone (DEPADE/REVIA) 50 MG tablet    Other Relevant Orders    Med Therapy Management Referral    Postsurgical malabsorption     Continue current vitamins. Vitamin D level continues to be high we will hold vitamin D   Labs ordered per protocol.  Added copper and zinc           Relevant Orders    CBC with platelets    Vitamin B12    Vitamin D Screen    Parathyroid Hormone Intact    Iron and Iron Binding Capacity    Ferritin    Zinc    Copper level   AOM Considerations:  Phentermine:   Stop taking in 2021 due to elevated blood pressure  Topiramate:     SE: Hives  GLP-1:             not covered.   Naltrexone:      Currently on.    Wellbutrin:       Stopped due to SE.   Metformin:       Was prescribed but never tried in past, was unsure if this was safe.  Contrave:        No AOM coverage   Qsymia:           No AOM coverage     PATIENT INSTRUCTIONS:  Take naltrexone 1-1/2 tablets in afternoon consistently.    We discussed adding metformin.  Lets see how the consistent naltrexone use goes.  And then you can follow-up with the [facility] pharmacist to review your questions about metformin and whether that may be a good addition.      Please call [phone] to schedule with a medical therapy management pharmacist in July    Labs ordered.  Please call [phone] to set up a lab appt.     Follow up:    Call [phone] to schedule next visit in late  Oct/Nov.    42 minutes spent on the date of the encounter doing chart review, history and exam, result review, counseling, developing plan of care, documentation, and further activities as noted        CHIEF COMPLAINT: Post-bariatric surgery follow-up Zepbound    HISTORY OF PRESENT ILLNESS:      5/16/2024     9:05 AM   Questions Regarding Prior Weight Loss Surgery Reviewed With Patient   I had the following weight loss procedure Sleeve Gastrectomy   What year was your surgery? 2015   How has your weight changed since your last visit? I have gained weight   Do you currently have any of the following Hypertension (high blood pressure)?   Do you have any concerns today? No   BARIATRIC SURGERY HX:    Surgery:  Gastric Sleeve  DOS:   06/16/2015  Surgeon: Dr. Selina MD    Facility:  [facility]  Initial Weight:  291.5 lb  Lowest weight post surgery:  192 lb    Interval history: Ordered Zepbound.  Zepbound was denied.  Exclusion to all GLP-1's.  Is on Naltrexone.  Is not taking consistently.    Mentally she does not take it if she wants to go out for cocktails,  or meals with her friends where she may want to indulge in a greater quantity of food.            Weight History:      5/16/2024     9:05 AM   --   What is your highest lifetime weight? 292   What is your lowest weight since surgery? (In pounds) 192     Initial Weight (lbs): 279 lbs  Weight: 263 lb (119.3 kg)  Last Visits Weight: 261 lb (118.4 kg)  Cumulative weight loss (lbs): 16  Weight Loss Percentage: 5.73%    VITAMINS AND MINERALS: Per diet visit 2/2024  2 Multivitamin with Minerals (Centrum Adult; lunch)  500 mg Calcium With Vitamin D (AM + dinner)  2000 international unit(s) Vitamin D -  MWF  1000 mcg Vitamin B-12 sublingual - MWF  No iron needed; post-menopausal  Vitamin B1 100 mg 1 time per week     Preservation ARED providing additional Vitamins E and C as well as Zinc and Copper. Pt will discuss with her ophthalmologist whether she needs this  specific supplement vs lutein alone. Will send provider message to monitor zinc and copper levels.         5/16/2024     9:05 AM   Questions Regarding Co-Morbidities and Health Concerns Reviewed With Patient   Pre-diabetes Never   Diabetes II Never   High Blood Pressure Stayed the same   High cholesterol Never   Heartburn/Reflux Never   Sleep apnea Improved   PCOS Never   Back pain Stayed the same   Joint pain Stayed the same   Lower leg swelling Stayed the same           5/16/2024     9:05 AM   Eating Habits   How many meals do you eat per day? 3   Do you snack between meals? Yes   How much food are you eating at each meal? 1/2 cup to 1 cup   Are you able to separate your meals and liquids by at least 30 minutes? Sometimes   Are you able to avoid liquid calories? Sometimes           5/16/2024     9:05 AM   Exercise Questions Reviewed With Patient   How often do you exercise? Daily   What is the duration of your exercise (in minutes)? 60+ Minutes   What types of exercise do you do? walking    other   What keeps you from being more active? Too tired       Social History:      5/16/2024     9:05 AM   --   Are you smoking? No   Are you drinking alcohol? Yes   How much alcohol? 2 per week       Medications:  Current Outpatient Medications   Medication Sig Dispense Refill    CALCIUM CITRATE PO       CENTRUM OR TABS 1 TABLET DAILY      cod liver oil CAPS capsule       Cyanocobalamin (VITAMIN B 12 PO)       CycloSPORINE (RESTASIS OP) Apply  to eye 2 times daily.      fluticasone (FLONASE) 50 MCG/ACT nasal spray Spray 2 sprays into both nostrils daily      ipratropium (ATROVENT) 0.03 % nasal spray INSTILL 1 SPRAY THREE TIMES DAILY WITH MEALS/EXCERCISE AS NEEDED FOR DRAINAGE      losartan-hydrochlorothiazide (HYZAAR) 50-12.5 MG tablet Take 1 tablet by mouth daily 90 tablet 0    naltrexone (DEPADE/REVIA) 50 MG tablet Take 1.5 tablets 1-2 hours before biggest craving time daily 135 tablet 1    oxymetazoline (12 HOUR NASAL  "DECONGESTANT) 0.05 % nasal spray INSTILL 4 SPRAYS TO EACH NOSTRIL IN EVENT OF NOSE BLEED      sodium chloride (OCEAN) 0.65 % nasal spray INSTILL 2 SPRAYS TO EACH NOSTRIL EVERY 1-2 HOURS WHILE AWAKE.      thiamine (VITAMIN B1) 100 MG tablet Take 100 mg by mouth once a week       No current facility-administered medications for this visit.         5/16/2024     9:05 AM   --   Do you avoid NSAIDs such as (Ibuprofen, Aleve, Naproxen, Advil)? Yes       ROS:  GI:       5/16/2024     9:05 AM   --   Vomiting No   Diarrhea No   Constipation No   Swallowing trouble No   Abdominal pain No   Heartburn No     Skin:       5/16/2024     9:05 AM   BAR RBS ROS - SKIN   Rash in skin folds No     Psych:       5/16/2024     9:05 AM   --   Depression No   Anxiety No     Female Only:       5/16/2024     9:05 AM   BAR RBS ROS -    Female only Post-menopausal   Stress urinary incontinence No       LABS/IMAGING/MEDICAL RECORDS REVIEW:   Reviewed    PHYSICAL EXAMINATION:  Ht 5' 7\" (1.702 m)   Wt 263 lb (119.3 kg)   BMI 41.19 kg/m    GENERAL: Healthy, alert and no distress  RESP: No audible wheeze, cough, or visible cyanosis.  No visible retractions or increased work of breathing.    SKIN: Visible skin clear. No significant rash, abnormal pigmentation or lesions.  PSYCH: Mentation appears normal, affect normal/bright, judgement and insight intact    COUNSELING PROVIDED:  We reviewed the important post op bariatric recommendations:  eating 3 meals daily  eating protein first, getting >60gm protein daily  eating slowly, chewing food well  avoiding/limiting calorie containing beverages  avoiding fluids 30 minutes before, during, and after meals  limiting restaurant or cafeteria eating to twice a week or less  Pt reminded to avoid marginal ulcers she should avoid tobacco at all, alcohol in excess, caffeine, and NSAIDS (unless indicated for cardioprotection or otherwise and opposed by a PPI).  Pt encouraged to establish and maintain a " consistent physical activity routine, 6-8 hours of restorative sleep each night and optimal stress management.  Pt counseled on the importance of life long vitamin supplementation and life long follow up.

## 2024-05-23 ENCOUNTER — VIRTUAL VISIT (OUTPATIENT)
Dept: SURGERY | Facility: CLINIC | Age: 59
End: 2024-05-23
Payer: COMMERCIAL

## 2024-05-23 VITALS — HEIGHT: 67 IN | WEIGHT: 263 LBS | BODY MASS INDEX: 41.28 KG/M2

## 2024-05-23 DIAGNOSIS — E66.01 CLASS 3 SEVERE OBESITY DUE TO EXCESS CALORIES WITHOUT SERIOUS COMORBIDITY WITH BODY MASS INDEX (BMI) OF 40.0 TO 44.9 IN ADULT (H): Primary | ICD-10-CM

## 2024-05-23 DIAGNOSIS — K91.2 POSTSURGICAL MALABSORPTION: ICD-10-CM

## 2024-05-23 DIAGNOSIS — E66.813 CLASS 3 SEVERE OBESITY DUE TO EXCESS CALORIES WITHOUT SERIOUS COMORBIDITY WITH BODY MASS INDEX (BMI) OF 40.0 TO 44.9 IN ADULT (H): Primary | ICD-10-CM

## 2024-05-23 DIAGNOSIS — Z98.84 BARIATRIC SURGERY STATUS: ICD-10-CM

## 2024-05-23 PROCEDURE — 99215 OFFICE O/P EST HI 40 MIN: CPT | Mod: 95 | Performed by: PHYSICIAN ASSISTANT

## 2024-05-23 RX ORDER — NALTREXONE HYDROCHLORIDE 50 MG/1
TABLET, FILM COATED ORAL
Qty: 135 TABLET | Refills: 1 | Status: SHIPPED | OUTPATIENT
Start: 2024-05-23 | End: 2024-07-29

## 2024-05-23 NOTE — ASSESSMENT & PLAN NOTE
Patient was congratulated on wt loss success thus far. Healthy habits to assist with further weight loss were discussed. Alta will continue naltrexone but take consistently.  We discussed long-term effects of this medication and safety profile.  She will see [facility] pharmacist in July or early August to see if weight continues to drop.  If not we may consider adding metformin.  This was discussed today.  Patient has some reservation and would like to talk to the [facility] pharmacist in more detail.

## 2024-05-23 NOTE — PATIENT INSTRUCTIONS
Nice to talk with you today. Below is the plan discussed.-  AMBER Hathaway      Pt Instructions:  Take naltrexone 1-1/2 tablets in afternoon consistently.    We discussed adding metformin.  Lets see how the consistent naltrexone use goes.  And then you can follow-up with the [facility] pharmacist to review your questions about metformin and whether that may be a good addition.      Please call [phone] to schedule with a medical therapy management pharmacist in July    Labs ordered.  Please call [phone] to set up a lab appt.       Follow up:    Call [phone] to schedule next visit in late Oct/Nov.                METFORMIN    Metformin is a medication that is used to assist with lowering blood sugars in patients that have Pre-Diabetes or Diabetes. It has also been found to help with modest weight loss.    Metformin helps to lower blood sugars by lowering the amount of sugar (glucose) your liver produces that would otherwise cause your blood sugar to increase. It also makes your body respond better to insulin (the product that lowers your blood sugar).     Emerging research reported in journals suggests metformin may also lead to weight loss as a result of changes in the appetite centers of the brain, shifts in the gut microbiome, and reversal of metabolic changes that usually happen with age.    Starting instructions:  Take 1 tablet by mouth daily with a meal for 1 week, then increase to 2 tablets daily with a meal, if tolerating can increase to 3 tablets daily with a meal or at bedtime.     Side-effects. Metformin is generally well tolerated. The main side-effects we see are: Diarrhea, nausea and stomach upset - this tends to subside over time as your body gets used to the medication. Taking this medications with food will lower these side effects.    Low blood sugar (hypoglycemia) is rare to occur with metformin, but can occur if you do not eat enough or are taking other medications that assist to lower  blood sugar. The signs of low blood sugar are:  Weakness  Shaky   Hungry  Sweating  Confusion     For any questions or concerns please send a [employer] message to our team or call our weight management call center at [phone] during regular business hours. For questions during evenings or weekends your messages will be addressed during the next business day.  For emergencies please call 911 or seek immediate medical care.      In order to get refills of this or any medication we prescribe you must be seen in the medical weight mgmt clinic every 2-3 months.

## 2024-05-23 NOTE — ASSESSMENT & PLAN NOTE
Continue current vitamins. Vitamin D level continues to be high we will hold vitamin D   Labs ordered per protocol.  Added copper and zinc

## 2024-05-24 ENCOUNTER — VIRTUAL VISIT (OUTPATIENT)
Dept: SURGERY | Facility: CLINIC | Age: 59
End: 2024-05-24
Payer: COMMERCIAL

## 2024-05-24 VITALS — BODY MASS INDEX: 41.28 KG/M2 | WEIGHT: 263 LBS | HEIGHT: 67 IN

## 2024-05-24 DIAGNOSIS — E66.01 CLASS 3 SEVERE OBESITY DUE TO EXCESS CALORIES WITHOUT SERIOUS COMORBIDITY WITH BODY MASS INDEX (BMI) OF 40.0 TO 44.9 IN ADULT (H): Primary | ICD-10-CM

## 2024-05-24 DIAGNOSIS — E66.813 CLASS 3 SEVERE OBESITY DUE TO EXCESS CALORIES WITHOUT SERIOUS COMORBIDITY WITH BODY MASS INDEX (BMI) OF 40.0 TO 44.9 IN ADULT (H): Primary | ICD-10-CM

## 2024-05-24 DIAGNOSIS — Z98.84 BARIATRIC SURGERY STATUS: ICD-10-CM

## 2024-05-24 PROCEDURE — 97803 MED NUTRITION INDIV SUBSEQ: CPT | Mod: 95

## 2024-05-24 NOTE — PROGRESS NOTES
"Alta is a 58 year old who is being evaluated via a billable video visit.      The patient has been notified of following:     \"This video visit will be conducted via a call between you and your physician/provider. We have found that certain health care needs can be provided without the need for an in-person physical exam.  This service lets us provide the care you need with a video conversation.  If a prescription is necessary we can send it directly to your pharmacy.  If lab work is needed we can place an order for that and you can then stop by our lab to have the test done at a later time.    Video visits are billed at different rates depending on your insurance coverage.  Please reach out to your insurance provider with any questions.    If during the course of the call the physician/provider feels a video visit is not appropriate, you will not be charged for this service.\"    Patient has given verbal consent for Video visit? Yes    How would you like to obtain your AVS? MyChart    If the video visit is dropped, the invitation should be resent by: Text to cell phone: [phone]    Will anyone else be joining your video visit? No    I    Video-Visit Details    Type of service:  Video Visit    Originating Location (pt. Location): Home    Distant Location (provider location):   Off-Site - Provider Home Office    Platform used for Video Visit: AdStack    Video Start Time:  9:35am    Video End Time: 9:52am    NUTRITION POST OP APPOINTMENT  DATE OF VISIT: May 24, 2024    Jenae Miller  1965  female  [id number]  58 year old     ASSESSMENT:    REASON FOR VISIT:  Jenae is a 58 year old year old female presents today for ~9 year PO nutrition follow-up appointment. Patient is accompanied by self.    DIAGNOSIS:  Status post gastric sleeve surgery (6/16/2015 @ [facility])  Obesity Grade III BMI >40     ANTHROPOMETRICS:  Initial Weight: 276 lbs   Height: 67\"    Current Weight: 263 lbs 0 oz     BMI: Body mass " index is 41.19 kg/m .    VITAMINS AND MINERALS:  2 Multivitamin with Minerals (Centrum Adult)  500 mg Calcium With Vitamin D (breakfast, lunch and dinner; separate from MVT by at least 2 hours  2000 international unit(s) Vitamin D -  MWF  1000 mcg Vitamin B-12 sublingual - MWF  No iron needed; post-menopausal  Vitamin B1 100 mg 1 time per week     Preservision ARED providing additional Vitamins E and C as well as Zinc and Copper. Pt will discuss with her ophthalmologist whether she needs this specific supplement vs lutein alone.     NUTRITION HISTORY:  Breakfast: greek yogurt + fruit (usually berries)   Lunch: leftovers  cheeseburger (slider) +/- chips or vegetables  Supper: fish sticks + vegetables  Snacks: [afternoon] chips  [evenings] chips, cookies, chocolate/candy   Fluids consumed: Water (24-30oz), enhanced water (16oz), soda (Mountain Dew, 12oz), probiotic juice (8oz, occasionally), milk (Fair Life or skim, 10-12oz), coffee (8oz)  Consuming liquid calories: Yes  Protein intake: 50-60 grams/day  Tolerate regular texture food: Yes  Portion size: 1 cup or more  Take 20-30 minutes to consume each meal: Yes   Eat protein foods first: Yes  Fluids and meals separate by at least 30 minutes: No  Chew foods thoroughly: Yes  Tolerating diet: Yes  Drinking high protein supplements: No  Consuming snacks per day: 1-2x/day  Additional Information: Ongoing challenges with evening snacking and craving, but pt feels she made some improvements by practicing alternative activities. Reviewed rationale for  fluids from meals.         PHYSICAL ACTIVITY:  Type: walking dog - moderate intensity   Frequency (days per week): 7  Duration (min): 60    DIAGNOSIS:  Previous Nutrition Diagnosis: Altered gastrointestinal function related to alteration in gastrointestinal structure as evidenced by history of gastric sleeve surgery.- no change    Previous goals:  Practice non-food alternatives to evening snacking - partially  met  Take Calcium per guidelines - met  Consider milk/protein drink both morning and evening - not met    Current Nutrition Diagnosis: Altered gastrointestinal function related to alteration in gastrointestinal structure as evidenced by history of gastric sleeve surgery.    INTERVENTION:   Nutrition Prescription: Eat 3 meals a day at regular intervals. Consume 60-90 grams of protein daily. Follow post-surgical vitamin and mineral protocol.  Assessed learning needs and learning preferences.    GOALS:  Have milk 2x/day or have a protein drink daily  Separate fluids and meals by 30 minutes  Practice alternative activities to evening snacking    Follow-Up:   Recommend q3m follow up visits to assist with lifestyle changes or per insurance.  Implementation: Discussed progress toward previous goals; reinforced importance of following bariatric lifestyle changes.    NUTRITION MONITORING AND EVALUATION:  Anticipated compliance: fair-good  Verbalized good understanding.    Follow up: Patient to follow up in 3 months.    TIME SPENT WITH PATIENT:  17 minutes      Janie Dean RD, LD  Clinical Dietitian

## 2024-05-24 NOTE — PATIENT INSTRUCTIONS
Emory Holm!      It was great chatting with you again today! Here's a summary of the goals we discussed:    1. Keep practicing non-food alternative activities to evening snacking  - Examples: crossword puzzles, arts/crafts/hobbies, games, projects, journaling, reading, adult coloring books, meditation/deep breathing, etc    2. Increase milk to 2x/day, or have a daily protein drink    3. Avoid drinking/sipping during all meals and snacks, as well as for 30 minutes after          Plan on following up in 3 months. This can be scheduled via our call center at . Reach out sooner with any questions or concerns. Have a great day!      Janie Dean RD, LD?  Clinical Dietitian       Easy Foods (by group)      Meals: pick 1 item from 3-4 food groups  Snacks: pick 1 item from 1-2 food groups     Protein:  yogurt  cottage cheese  low-fat cheese sticks/string cheese  lean deli meat (ie: chicken, turkey or low-fat ham)  lean beef or turkey jerky  pre-cooked grilled chicken breast  hard boiled eggs  nuts/seeds (in moderation - up to 1 serving [1/4c] per day)  protein shakes/bars (ie:  Premier Protein )  low-fat milk   edamame  chickpeas  rotisserie chicken   frozen veggie/black bean burgers  canned tuna or chicken         Vegetables:  baby carrots  celery sticks  sugar snap peas  cherry tomatoes  sliced bell peppers  sliced cucumber  chopped broccoli or cauliflower  raw string beans     Fruit:  grapes  berries  apples  pears  bananas  peaches  nectarines  plums  kiwi  pineapple        Starches:  whole wheat do bread ( Mukund s )  high-fiber tortillas ( Presto Carb Balance )  Bean-based tortilla chips ( Beanitos )  Whole wheat crackers ( Triscuits )  Whole wheat bread  Dried/roasted beans/lentils ( Bada Bean Bada Boom  dried Frank beans - available online or in some stores)  roasted chickpeas  high-fiber cereal (Fiber One, Kashi, Shredded Wheat, Grape Nuts, etc)  air-popped popcorn (1-2 cups)

## 2024-06-23 DIAGNOSIS — I10 HYPERTENSION, GOAL BELOW 140/90: ICD-10-CM

## 2024-06-24 PROBLEM — E66.09 CLASS 2 OBESITY DUE TO EXCESS CALORIES WITHOUT SERIOUS COMORBIDITY WITH BODY MASS INDEX (BMI) OF 39.0 TO 39.9 IN ADULT: Status: RESOLVED | Noted: 2023-10-20 | Resolved: 2024-06-24

## 2024-06-24 PROBLEM — E66.812 CLASS 2 OBESITY DUE TO EXCESS CALORIES WITHOUT SERIOUS COMORBIDITY WITH BODY MASS INDEX (BMI) OF 39.0 TO 39.9 IN ADULT: Status: RESOLVED | Noted: 2023-10-20 | Resolved: 2024-06-24

## 2024-06-24 PROBLEM — Z90.13 STATUS POST MASTECTOMY, BILATERAL: Status: RESOLVED | Noted: 2018-08-06 | Resolved: 2024-06-24

## 2024-06-24 PROBLEM — E66.01 SEVERE OBESITY WITH BODY MASS INDEX (BMI) OF 35.0 TO 39.9 WITH SERIOUS COMORBIDITY (H): Status: RESOLVED | Noted: 2018-08-06 | Resolved: 2024-06-24

## 2024-06-24 RX ORDER — LOSARTAN POTASSIUM AND HYDROCHLOROTHIAZIDE 12.5; 5 MG/1; MG/1
1 TABLET ORAL DAILY
Qty: 90 TABLET | Refills: 0 | Status: SHIPPED | OUTPATIENT
Start: 2024-06-24 | End: 2024-07-08

## 2024-06-26 ENCOUNTER — LAB (OUTPATIENT)
Dept: LAB | Facility: CLINIC | Age: 59
End: 2024-06-26
Payer: COMMERCIAL

## 2024-06-26 DIAGNOSIS — K91.2 POSTSURGICAL MALABSORPTION: ICD-10-CM

## 2024-06-26 PROBLEM — M17.9 OSTEOARTHRITIS OF KNEE: Status: ACTIVE | Noted: 2019-12-30

## 2024-06-26 LAB
ERYTHROCYTE [DISTWIDTH] IN BLOOD BY AUTOMATED COUNT: 12.8 % (ref 10–15)
FERRITIN SERPL-MCNC: 57 NG/ML (ref 11–328)
HCT VFR BLD AUTO: 39.9 % (ref 35–47)
HGB BLD-MCNC: 13.2 G/DL (ref 11.7–15.7)
IRON BINDING CAPACITY (ROCHE): 293 UG/DL (ref 240–430)
IRON SATN MFR SERPL: 27 % (ref 15–46)
IRON SERPL-MCNC: 79 UG/DL (ref 37–145)
MCH RBC QN AUTO: 31.1 PG (ref 26.5–33)
MCHC RBC AUTO-ENTMCNC: 33.1 G/DL (ref 31.5–36.5)
MCV RBC AUTO: 94 FL (ref 78–100)
PLATELET # BLD AUTO: 263 10E3/UL (ref 150–450)
PTH-INTACT SERPL-MCNC: 29 PG/ML (ref 15–65)
RBC # BLD AUTO: 4.24 10E6/UL (ref 3.8–5.2)
VIT B12 SERPL-MCNC: 1171 PG/ML (ref 232–1245)
VIT D+METAB SERPL-MCNC: 82 NG/ML (ref 20–50)
WBC # BLD AUTO: 7.1 10E3/UL (ref 4–11)

## 2024-06-26 PROCEDURE — 85027 COMPLETE CBC AUTOMATED: CPT

## 2024-06-26 PROCEDURE — 83970 ASSAY OF PARATHORMONE: CPT

## 2024-06-26 PROCEDURE — 82728 ASSAY OF FERRITIN: CPT

## 2024-06-26 PROCEDURE — 84630 ASSAY OF ZINC: CPT | Mod: 90

## 2024-06-26 PROCEDURE — 83550 IRON BINDING TEST: CPT

## 2024-06-26 PROCEDURE — 82525 ASSAY OF COPPER: CPT | Mod: 90

## 2024-06-26 PROCEDURE — 82607 VITAMIN B-12: CPT

## 2024-06-26 PROCEDURE — 82306 VITAMIN D 25 HYDROXY: CPT

## 2024-06-26 PROCEDURE — 83540 ASSAY OF IRON: CPT

## 2024-06-26 PROCEDURE — 36415 COLL VENOUS BLD VENIPUNCTURE: CPT

## 2024-06-26 PROCEDURE — 99000 SPECIMEN HANDLING OFFICE-LAB: CPT

## 2024-06-28 LAB
COPPER SERPL-MCNC: 119.4 UG/DL
ZINC SERPL-MCNC: 101.1 UG/DL

## 2024-07-02 PROBLEM — M17.9 OSTEOARTHRITIS OF KNEE: Status: RESOLVED | Noted: 2019-12-30 | Resolved: 2024-07-02

## 2024-07-02 PROBLEM — D12.6 SERRATED ADENOMA OF COLON: Status: RESOLVED | Noted: 2018-01-05 | Resolved: 2024-07-02

## 2024-07-02 PROBLEM — Z86.0100 HISTORY OF COLONIC POLYPS: Status: ACTIVE | Noted: 2017-12-29

## 2024-07-07 SDOH — HEALTH STABILITY: PHYSICAL HEALTH: ON AVERAGE, HOW MANY DAYS PER WEEK DO YOU ENGAGE IN MODERATE TO STRENUOUS EXERCISE (LIKE A BRISK WALK)?: 7 DAYS

## 2024-07-07 SDOH — HEALTH STABILITY: PHYSICAL HEALTH: ON AVERAGE, HOW MANY MINUTES DO YOU ENGAGE IN EXERCISE AT THIS LEVEL?: 50 MIN

## 2024-07-07 ASSESSMENT — ASTHMA QUESTIONNAIRES
QUESTION_3 LAST FOUR WEEKS HOW OFTEN DID YOUR ASTHMA SYMPTOMS (WHEEZING, COUGHING, SHORTNESS OF BREATH, CHEST TIGHTNESS OR PAIN) WAKE YOU UP AT NIGHT OR EARLIER THAN USUAL IN THE MORNING: NOT AT ALL
ACT_TOTALSCORE: 25
ACT_TOTALSCORE: 25
QUESTION_4 LAST FOUR WEEKS HOW OFTEN HAVE YOU USED YOUR RESCUE INHALER OR NEBULIZER MEDICATION (SUCH AS ALBUTEROL): NOT AT ALL
QUESTION_5 LAST FOUR WEEKS HOW WOULD YOU RATE YOUR ASTHMA CONTROL: COMPLETELY CONTROLLED
QUESTION_1 LAST FOUR WEEKS HOW MUCH OF THE TIME DID YOUR ASTHMA KEEP YOU FROM GETTING AS MUCH DONE AT WORK, SCHOOL OR AT HOME: NONE OF THE TIME
QUESTION_2 LAST FOUR WEEKS HOW OFTEN HAVE YOU HAD SHORTNESS OF BREATH: NOT AT ALL

## 2024-07-07 ASSESSMENT — SOCIAL DETERMINANTS OF HEALTH (SDOH): HOW OFTEN DO YOU GET TOGETHER WITH FRIENDS OR RELATIVES?: TWICE A WEEK

## 2024-07-08 ENCOUNTER — OFFICE VISIT (OUTPATIENT)
Dept: FAMILY MEDICINE | Facility: CLINIC | Age: 59
End: 2024-07-08
Payer: COMMERCIAL

## 2024-07-08 VITALS
SYSTOLIC BLOOD PRESSURE: 120 MMHG | TEMPERATURE: 98.5 F | RESPIRATION RATE: 16 BRPM | DIASTOLIC BLOOD PRESSURE: 73 MMHG | HEIGHT: 67 IN | WEIGHT: 267 LBS | HEART RATE: 58 BPM | OXYGEN SATURATION: 96 % | BODY MASS INDEX: 41.91 KG/M2

## 2024-07-08 DIAGNOSIS — I10 HYPERTENSION, GOAL BELOW 140/90: ICD-10-CM

## 2024-07-08 DIAGNOSIS — Z00.00 ROUTINE GENERAL MEDICAL EXAMINATION AT A HEALTH CARE FACILITY: Primary | ICD-10-CM

## 2024-07-08 DIAGNOSIS — E66.01 CLASS 3 SEVERE OBESITY DUE TO EXCESS CALORIES WITHOUT SERIOUS COMORBIDITY WITH BODY MASS INDEX (BMI) OF 40.0 TO 44.9 IN ADULT (H): ICD-10-CM

## 2024-07-08 DIAGNOSIS — E66.813 CLASS 3 SEVERE OBESITY DUE TO EXCESS CALORIES WITHOUT SERIOUS COMORBIDITY WITH BODY MASS INDEX (BMI) OF 40.0 TO 44.9 IN ADULT (H): ICD-10-CM

## 2024-07-08 DIAGNOSIS — Z98.84 BARIATRIC SURGERY STATUS: ICD-10-CM

## 2024-07-08 DIAGNOSIS — Q43.3: ICD-10-CM

## 2024-07-08 LAB
ANION GAP SERPL CALCULATED.3IONS-SCNC: 9 MMOL/L (ref 7–15)
BUN SERPL-MCNC: 10.8 MG/DL (ref 6–20)
CALCIUM SERPL-MCNC: 9.8 MG/DL (ref 8.6–10)
CHLORIDE SERPL-SCNC: 103 MMOL/L (ref 98–107)
CREAT SERPL-MCNC: 0.94 MG/DL (ref 0.51–0.95)
DEPRECATED HCO3 PLAS-SCNC: 28 MMOL/L (ref 22–29)
EGFRCR SERPLBLD CKD-EPI 2021: 71 ML/MIN/1.73M2
GLUCOSE SERPL-MCNC: 95 MG/DL (ref 70–99)
POTASSIUM SERPL-SCNC: 4.2 MMOL/L (ref 3.4–5.3)
SODIUM SERPL-SCNC: 140 MMOL/L (ref 135–145)
TSH SERPL DL<=0.005 MIU/L-ACNC: 1.07 UIU/ML (ref 0.3–4.2)

## 2024-07-08 PROCEDURE — 90746 HEPB VACCINE 3 DOSE ADULT IM: CPT | Performed by: FAMILY MEDICINE

## 2024-07-08 PROCEDURE — 84443 ASSAY THYROID STIM HORMONE: CPT | Performed by: FAMILY MEDICINE

## 2024-07-08 PROCEDURE — 99213 OFFICE O/P EST LOW 20 MIN: CPT | Mod: 25 | Performed by: FAMILY MEDICINE

## 2024-07-08 PROCEDURE — 90471 IMMUNIZATION ADMIN: CPT | Performed by: FAMILY MEDICINE

## 2024-07-08 PROCEDURE — 36415 COLL VENOUS BLD VENIPUNCTURE: CPT | Performed by: FAMILY MEDICINE

## 2024-07-08 PROCEDURE — 90472 IMMUNIZATION ADMIN EACH ADD: CPT | Performed by: FAMILY MEDICINE

## 2024-07-08 PROCEDURE — 80048 BASIC METABOLIC PNL TOTAL CA: CPT | Performed by: FAMILY MEDICINE

## 2024-07-08 PROCEDURE — 99396 PREV VISIT EST AGE 40-64: CPT | Mod: 25 | Performed by: FAMILY MEDICINE

## 2024-07-08 PROCEDURE — 90677 PCV20 VACCINE IM: CPT | Performed by: FAMILY MEDICINE

## 2024-07-08 RX ORDER — LOSARTAN POTASSIUM AND HYDROCHLOROTHIAZIDE 12.5; 5 MG/1; MG/1
1 TABLET ORAL DAILY
Qty: 90 TABLET | Refills: 3 | Status: SHIPPED | OUTPATIENT
Start: 2024-07-08

## 2024-07-08 NOTE — LETTER
My Asthma Action Plan    Name: Jenae Miller   YOB: 1965  Date: 7/8/2024   My doctor: Lola Bray MD   My clinic: [facility]        My Rescue Medicine:   Albuterol inhaler (Proair/Ventolin/Proventil HFA)  2-4 puffs EVERY 4 HOURS as needed. Use a spacer if recommended by your provider.   My Asthma Severity:   Intermittent / Exercise Induced  Know your asthma triggers: upper respiratory infections             GREEN ZONE   Good Control  I feel good  No cough or wheeze  Can work, sleep and play without asthma symptoms       Take your asthma control medicine every day.     If exercise triggers your asthma, take your rescue medication  15 minutes before exercise or sports, and  During exercise if you have asthma symptoms  Spacer to use with inhaler: If you have a spacer, make sure to use it with your inhaler             YELLOW ZONE Getting Worse  I have ANY of these:  I do not feel good  Cough or wheeze  Chest feels tight  Wake up at night   Keep taking your Green Zone medications  Start taking your rescue medicine:  every 20 minutes for up to 1 hour. Then every 4 hours for 24-48 hours.  If you stay in the Yellow Zone for more than 12-24 hours, contact your doctor.  If you do not return to the Green Zone in 12-24 hours or you get worse, start taking your oral steroid medicine if prescribed by your provider.           RED ZONE Medical Alert - Get Help  I have ANY of these:  I feel awful  Medicine is not helping  Breathing getting harder  Trouble walking or talking  Nose opens wide to breathe       Take your rescue medicine NOW  If your provider has prescribed an oral steroid medicine, start taking it NOW  Call your doctor NOW  If you are still in the Red Zone after 20 minutes and you have not reached your doctor:  Take your rescue medicine again and  Call 911 or go to the emergency room right away    See your regular doctor within 2 weeks of an Emergency Room or Urgent Care  visit for follow-up treatment.          Annual Reminders:  Meet with Asthma Educator,  Flu Shot in the Fall, consider Pneumonia Vaccination for patients with asthma (aged 19 and older).    Pharmacy: [facility] [id number] [place] [id number]    Electronically signed by Lola Bray MD   Date: 07/08/24                    Asthma Triggers  How To Control Things That Make Your Asthma Worse    Triggers are things that make your asthma worse.  Look at the list below to help you find your triggers and   what you can do about them. You can help prevent asthma flare-ups by staying away from your triggers.      Trigger                                                          What you can do   Cigarette Smoke  Tobacco smoke can make asthma worse. Do not allow smoking in your home, car or around you.  Be sure no one smokes at a child s day care or school.  If you smoke, ask your health care provider for ways to help you quit.  Ask family members to quit too.  Ask your health care provider for a referral to Quit Plan to help you quit smoking, or call 7-127-344-PLAN.     Colds, Flu, Bronchitis  These are common triggers of asthma. Wash your hands often.  Don t touch your eyes, nose or mouth.  Get a flu shot every year.     Dust Mites  These are tiny bugs that live in cloth or carpet. They are too small to see. Wash sheets and blankets in hot water every week.   Encase pillows and mattress in dust mite proof covers.  Avoid having carpet if you can. If you have carpet, vacuum weekly.   Use a dust mask and HEPA vacuum.   Pollen and Outdoor Mold  Some people are allergic to trees, grass, or weed pollen, or molds. Try to keep your windows closed.  Limit time out doors when pollen count is high.   Ask you health care provider about taking medicine during allergy season.     Animal Dander  Some people are allergic to skin flakes, urine or saliva from pets with fur or feathers. Keep pets with fur or feathers out of your  home.    If you can t keep the pet outdoors, then keep the pet out of your bedroom.  Keep the bedroom door closed.  Keep pets off cloth furniture and away from stuffed toys.     Mice, Rats, and Cockroaches  Some people are allergic to the waste from these pests.   Cover food and garbage.  Clean up spills and food crumbs.  Store grease in the refrigerator.   Keep food out of the bedroom.   Indoor Mold  This can be a trigger if your home has high moisture. Fix leaking faucets, pipes, or other sources of water.   Clean moldy surfaces.  Dehumidify basement if it is damp and smelly.   Smoke, Strong Odors, and Sprays  These can reduce air quality. Stay away from strong odors and sprays, such as perfume, powder, hair spray, paints, smoke incense, paint, cleaning products, candles and new carpet.   Exercise or Sports  Some people with asthma have this trigger. Be active!  Ask your doctor about taking medicine before sports or exercise to prevent symptoms.    Warm up for 5-10 minutes before and after sports or exercise.     Other Triggers of Asthma  Cold air:  Cover your nose and mouth with a scarf.  Sometimes laughing or crying can be a trigger.  Some medicines and food can trigger asthma.

## 2024-07-08 NOTE — NURSING NOTE
Prior to immunization administration, verified patients identity using patient s name and date of birth. Please see Immunization Activity for additional information.     Screening Questionnaire for Adult Immunization    Are you sick today?   No   Do you have allergies to medications, food, a vaccine component or latex?   No   Have you ever had a serious reaction after receiving a vaccination?   No   Do you have a long-term health problem with heart, lung, kidney, or metabolic disease (e.g., diabetes), asthma, a blood disorder, no spleen, complement component deficiency, a cochlear implant, or a spinal fluid leak?  Are you on long-term aspirin therapy?   No   Do you have cancer, leukemia, HIV/AIDS, or any other immune system problem?   No   Do you have a parent, brother, or sister with an immune system problem?   No   In the past 3 months, have you taken medications that affect  your immune system, such as prednisone, other steroids, or anticancer drugs; drugs for the treatment of rheumatoid arthritis, Crohn s disease, or psoriasis; or have you had radiation treatments?   No   Have you had a seizure, or a brain or other nervous system problem?   No   During the past year, have you received a transfusion of blood or blood    products, or been given immune (gamma) globulin or antiviral drug?   No   For women: Are you pregnant or is there a chance you could become       pregnant during the next month?   No   Have you received any vaccinations in the past 4 weeks?   No     Immunization questionnaire answers were all negative.      Patient instructed to remain in clinic for 15 minutes afterwards, and to report any adverse reactions.     Screening performed by Harriet Edwards CMA on 7/8/2024 at 11:55 AM.

## 2024-07-08 NOTE — PATIENT INSTRUCTIONS
Patient Education   Preventive Care Advice   This is general advice given by our system to help you stay healthy. However, your care team may have specific advice just for you. Please talk to your care team about your preventive care needs.  Nutrition  Eat 5 or more servings of fruits and vegetables each day.  Try wheat bread, brown rice and whole grain pasta (instead of white bread, rice, and pasta).  Get enough calcium and vitamin D. Check the label on foods and aim for 100% of the RDA (recommended daily allowance).  Lifestyle  Exercise at least 150 minutes each week  (30 minutes a day, 5 days a week).  Do muscle strengthening activities 2 days a week. These help control your weight and prevent disease.  No smoking.  Wear sunscreen to prevent skin cancer.  Have a dental exam and cleaning every 6 months.  Yearly exams  See your health care team every year to talk about:  Any changes in your health.  Any medicines your care team has prescribed.  Preventive care, family planning, and ways to prevent chronic diseases.  Shots (vaccines)   HPV shots (up to age 26), if you've never had them before.  Hepatitis B shots (up to age 59), if you've never had them before.  COVID-19 shot: Get this shot when it's due.  Flu shot: Get a flu shot every year.  Tetanus shot: Get a tetanus shot every 10 years.  Pneumococcal, hepatitis A, and RSV shots: Ask your care team if you need these based on your risk.  Shingles shot (for age 50 and up)  General health tests  Diabetes screening:  Starting at age 35, Get screened for diabetes at least every 3 years.  If you are younger than age 35, ask your care team if you should be screened for diabetes.  Cholesterol test: At age 39, start having a cholesterol test every 5 years, or more often if advised.  Bone density scan (DEXA): At age 50, ask your care team if you should have this scan for osteoporosis (brittle bones).  Hepatitis C: Get tested at least once in your life.  STIs (sexually  transmitted infections)  Before age 24: Ask your care team if you should be screened for STIs.  After age 24: Get screened for STIs if you're at risk. You are at risk for STIs (including HIV) if:  You are sexually active with more than one person.  You don't use condoms every time.  You or a partner was diagnosed with a sexually transmitted infection.  If you are at risk for HIV, ask about PrEP medicine to prevent HIV.  Get tested for HIV at least once in your life, whether you are at risk for HIV or not.  Cancer screening tests  Cervical cancer screening: If you have a cervix, begin getting regular cervical cancer screening tests starting at age 21.  Breast cancer scan (mammogram): If you've ever had breasts, begin having regular mammograms starting at age 40. This is a scan to check for breast cancer.  Colon cancer screening: It is important to start screening for colon cancer at age 45.  Have a colonoscopy test every 10 years (or more often if you're at risk) Or, ask your provider about stool tests like a FIT test every year or Cologuard test every 3 years.  To learn more about your testing options, visit:   .  For help making a decision, visit:   https://bit.ly/aw25344.  Prostate cancer screening test: If you have a prostate, ask your care team if a prostate cancer screening test (PSA) at age 55 is right for you.  Lung cancer screening: If you are a current or former smoker ages 50 to 80, ask your care team if ongoing lung cancer screenings are right for you.  For informational purposes only. Not to replace the advice of your health care provider. Copyright   2023 [facility]. All rights reserved. Clinically reviewed by the [facility]. [employer] [id number] - REV 01/24.  Substance Use Disorder: Care Instructions  Overview     You can improve your life and health by stopping your use of alcohol or drugs. When you don't drink or use drugs, you may feel and sleep better. You may  get along better with your family, friends, and coworkers. There are medicines and programs that can help with substance use disorder.  How can you care for yourself at home?  Here are some ways to help you stay sober and prevent relapse.  If you have been given medicine to help keep you sober or reduce your cravings, be sure to take it exactly as prescribed.  Talk to your doctor about programs that can help you stop using drugs or drinking alcohol.  Do not keep alcohol or drugs in your home.  Plan ahead. Think about what you'll say if other people ask you to drink or use drugs. Try not to spend time with people who drink or use drugs.  Use the time and money spent on drinking or drugs to do something that's important to you.  Preventing a relapse  Have a plan to deal with relapse. Learn to recognize changes in your thinking that lead you to drink or use drugs. Get help before you start to drink or use drugs again.  Try to stay away from situations, friends, or places that may lead you to drink or use drugs.  If you feel the need to drink alcohol or use drugs again, seek help right away. Call a trusted friend or family member. Some people get support from organizations such as Narcotics Anonymous or [employer] or from treatment facilities.  If you relapse, get help as soon as you can. Some people make a plan with another person that outlines what they want that person to do for them if they relapse. The plan usually includes how to handle the relapse and who to notify in case of relapse.  Don't give up. Remember that a relapse doesn't mean that you have failed. Use the experience to learn the triggers that lead you to drink or use drugs. Then quit again. Recovery is a lifelong process. Many people have several relapses before they are able to quit for good.  Follow-up care is a key part of your treatment and safety. Be sure to make and go to all appointments, and call your doctor if you are having problems. It's  "also a good idea to know your test results and keep a list of the medicines you take.  When should you call for help?   Call 911  anytime you think you may need emergency care. For example, call if you or someone else:    Has overdosed or has withdrawal signs. Be sure to tell the emergency workers that you are or someone else is using or trying to quit using drugs. Overdose or withdrawal signs may include:  Losing consciousness.  Seizure.  Seeing or hearing things that aren't there (hallucinations).     Is thinking or talking about suicide or harming others.   Where to get help 24 hours a day, 7 days a week   If you or someone you know talks about suicide, self-harm, a mental health crisis, a substance use crisis, or any other kind of emotional distress, get help right away. You can:    Call the Suicide and Crisis Lifeline at 988.     Call 7-759-268-TALK (1-941.662.6139).     Text HOME to 052314 to access the Crisis Text Line.   Consider saving these numbers in your phone.  Go to [employer] for more information or to chat online.  Call your doctor now or seek immediate medical care if:    You are having withdrawal symptoms. These may include nausea or vomiting, sweating, shakiness, and anxiety.   Watch closely for changes in your health, and be sure to contact your doctor if:    You have a relapse.     You need more help or support to stop.   Where can you learn more?  Go to https://www.[employer].net/patiented  Enter H573 in the search box to learn more about \"Substance Use Disorder: Care Instructions.\"  Current as of: November 15, 2023               Content Version: 14.0    4343-2757 [employer].   Care instructions adapted under license by your healthcare professional. If you have questions about a medical condition or this instruction, always ask your healthcare professional. [employer] disclaims any warranty or liability for your use of this information.         "

## 2024-07-08 NOTE — PROGRESS NOTES
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   - BASIC METABOLIC PANEL; Future  - losartan-hydrochlorothiazide (HYZAAR) 50-12.5 MG tablet; Take 1 tablet by mouth daily  - BASIC METABOLIC PANEL    Class 3 severe obesity due to excess calories without serious comorbidity with body mass index (BMI) of 40.0 to 44.9 in adult (H)  Bariatric surgery status  Follow-up with bariatrics as planned             BMI  Estimated body mass index is 41.81 kg/m  as calculated from the following:    Height as of this encounter: 1.702 m (5' 7.01\").    Weight as of this encounter: 121.1 kg (267 lb).   Weight management plan: Patient referred to endocrine and/or weight management specialty    Counseling  Appropriate preventive services were discussed with this patient, including applicable screening as appropriate for fall prevention, nutrition, physical activity, Tobacco-use cessation, weight loss and cognition.  Checklist reviewing preventive services available has been given to the patient.  Reviewed patient's diet, addressing concerns and/or questions.   She is at risk for psychosocial distress and has been provided with information to reduce risk.       Follow-up yearly     Fernando Holm is a 58 year old, presenting for the following:  Physical        7/8/2024    11:14 AM   Additional Questions   Roomed by Etelvina FLORES CMA   Accompanied by Self        Health Care Directive  Patient does not have a Health Care Directive or Living Will: Discussed advance care planning with patient; however, patient declined at this time.    HPI  Hypertension well controlled on current medications without side effects, chest pain, or dyspnea. "             7/7/2024   General Health   How would you rate your overall physical health? Good   Feel stress (tense, anxious, or unable to sleep) Only a little      (!) STRESS CONCERN      7/7/2024   Nutrition   Three or more servings of calcium each day? Yes   Diet: Regular (no restrictions)   How many servings of fruit and vegetables per day? 4 or more   How many sweetened beverages each day? 0-1            7/7/2024   Exercise   Days per week of moderate/strenous exercise 7 days   Average minutes spent exercising at this level 50 min            7/7/2024   Social Factors   Frequency of gathering with friends or relatives Twice a week   Worry food won't last until get money to buy more No   Food not last or not have enough money for food? No   Do you have housing? (Housing is defined as stable permanent housing and does not include staying ouside in a car, in a tent, in an abandoned building, in an overnight shelter, or couch-surfing.) Yes   Are you worried about losing your housing? No   Lack of transportation? No   Unable to get utilities (heat,electricity)? No            7/7/2024   Fall Risk   Fallen 2 or more times in the past year? No   Trouble with walking or balance? No             7/7/2024   Dental   Dentist two times every year? Yes            7/7/2024   TB Screening   Were you born outside of the [place]? No            Today's PHQ-2 Score:       7/7/2024    11:48 PM   PHQ-2 ( 1999 [employer])   Q1: Little interest or pleasure in doing things 0   Q2: Feeling down, depressed or hopeless 0   PHQ-2 Score 0   Q1: Little interest or pleasure in doing things Not at all   Q2: Feeling down, depressed or hopeless Not at all   PHQ-2 Score 0           7/7/2024   Substance Use   Alcohol more than 3/day or more than 7/wk No   Do you use any other substances recreationally? (!) ALCOHOL        Social History     Tobacco Use    Smoking status: Never    Smokeless tobacco: Never   Vaping Use    Vaping status: Never Used   Substance  Use Topics    Alcohol use: Yes     Comment: One or two drinks a week    Drug use: No           10/15/2021   LAST FHS-7 RESULTS   1st degree relative breast or ovarian cancer Yes   Any relative bilateral breast cancer No   Any male have breast cancer No   Any ONE woman have BOTH breast AND ovarian cancer No   Any woman with breast cancer before 50yrs No   2 or more relatives with breast AND/OR ovarian cancer Yes   2 or more relatives with breast AND/OR bowel cancer Yes           Mammogram Screening - Annual screen due to history of breast cancer, carcinoma in situ, or hyperplasia        2024   STI Screening   New sexual partner(s) since last STI/HIV test? No        History of abnormal Pap smear: No - age 30- 64 PAP with HPV every 5 years recommended        2022     2:40 PM 2019    12:00 AM   PAP / HPV   PAP-ABSTRACT See Scanned Document     See Scanned Document           This result is from an external source.     ASCVD Risk   The ASCVD Risk score (Jerilyn AMADOR, et al., 2019) failed to calculate for the following reasons:    The valid HDL cholesterol range is 20 to 100 mg/dL    Fracture Risk Assessment Tool  Link to Frax Calculator  Use the information below to complete the Frax calculator  : 1965  Sex: female  Weight (kg): 121.1 kg (actual weight)  Height (cm): 170.2 cm  Previous Fragility Fracture:  No  History of parent with fractured hip:  No  Current Smoking:  No  Patient has been on glucocorticoids for more than 3 months (5mg/day or more): No  Rheumatoid Arthritis on Problem List:  No  Secondary Osteoporosis on Problem List:  No  Consumes 3 or more units of alcohol per day: No  Femoral Neck BMD (g/cm2)           Reviewed and updated as needed this visit by Provider   Tobacco  Allergies  Meds  Problems  Med Hx  Surg Hx  Fam Hx     Sexual Activity          Patient Active Problem List   Diagnosis    Intermittent asthma    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    COSMETIC SURGERY   and     CRYOCAUTERY OF CERVIX      [place]    CYSTOSCOPY,DIL URETHRAL STRICTURE      age 7    HC DILATION/CURETTAGE DIAG/THER NON OB      HC TOOTH EXTRACTION W/FORCEP  1986    MASTECTOMY, BILATERAL  2010    rt sided breast ca  with reconstruction    TONSILLECTOMY & ADENOIDECTOMY  2005    VASCULAR SURGERY  2017    Closures, phlebectomies, sclerotherapy    [facility]HC COLONOSCOPY THRU STOMA W BIOPSY/CAUTERY TUMOR/POLYP/LESION      Dr. Jackson 89, '01 Repeat     ZZHC COLONOSCOPY THRU STOMA, DIAGNOSTIC  2007            Social History     Tobacco Use    Smoking status: Never    Smokeless tobacco: Never   Substance Use Topics    Alcohol use: Yes     Comment: One or two drinks a week     Family History   Problem Relation Age of Onset    Obesity Mother     Breast Cancer Mother 57         at 60 of breast cancer    Colon Polyps Father         large polyps    Diabetes Father     Hypertension Father     Obesity Father     Unknown/Adopted Maternal Grandmother     Unknown/Adopted Maternal Grandfather     Cancer Paternal Grandmother         stomach    Obesity Sister     Obesity Sister     Breast Cancer Other     Obesity Sister              Review of Systems  CONSTITUTIONAL: POSITIVE for fatigue  ENT/MOUTH: NEGATIVE for ear, mouth and throat problems  RESP: NEGATIVE for significant cough or  "SOB  CV: NEGATIVE for chest pain, palpitations or peripheral edema  MUSCULOSKELETAL: NEGATIVE for significant arthralgias or myalgia     Objective    Exam  /73 (BP Location: Left arm, Patient Position: Sitting, Cuff Size: Adult Large)   Pulse 58   Temp 98.5  F (36.9  C) (Oral)   Resp 16   Ht 1.702 m (5' 7.01\")   Wt 121.1 kg (267 lb)   SpO2 96%   BMI 41.81 kg/m     Estimated body mass index is 41.81 kg/m  as calculated from the following:    Height as of this encounter: 1.702 m (5' 7.01\").    Weight as of this encounter: 121.1 kg (267 lb).    Physical Exam  GENERAL: alert and no distress  EYES: Eyes grossly normal to inspection, PERRL and conjunctivae and sclerae normal  HENT: ear canals and TM's normal, nose and mouth without ulcers or lesions  NECK: no adenopathy, no asymmetry, masses, or scars  RESP: lungs clear to auscultation - no rales, rhonchi or wheezes  CV: regular rate and rhythm, normal S1 S2, no S3 or S4, no murmur, click or rub, no peripheral edema  ABDOMEN: soft, nontender, no hepatosplenomegaly, no masses and bowel sounds normal  MS: no gross musculoskeletal defects noted, no edema  SKIN: no suspicious lesions or rashes  NEURO: Normal strength and tone, mentation intact and speech normal  PSYCH: mentation appears normal, affect normal/bright        Signed Electronically by: Lola Bray MD    "

## 2024-07-29 ENCOUNTER — VIRTUAL VISIT (OUTPATIENT)
Dept: CARDIOLOGY | Facility: CLINIC | Age: 59
End: 2024-07-29
Attending: PHYSICIAN ASSISTANT
Payer: COMMERCIAL

## 2024-07-29 VITALS — WEIGHT: 267 LBS | BODY MASS INDEX: 41.91 KG/M2 | HEIGHT: 67 IN

## 2024-07-29 DIAGNOSIS — Z78.9 TAKES DIETARY SUPPLEMENTS: ICD-10-CM

## 2024-07-29 DIAGNOSIS — E66.813 CLASS 3 SEVERE OBESITY DUE TO EXCESS CALORIES WITHOUT SERIOUS COMORBIDITY WITH BODY MASS INDEX (BMI) OF 40.0 TO 44.9 IN ADULT (H): ICD-10-CM

## 2024-07-29 DIAGNOSIS — E66.01 OBESITY, CLASS III, BMI 40-49.9 (MORBID OBESITY) (H): Primary | ICD-10-CM

## 2024-07-29 DIAGNOSIS — E66.01 CLASS 3 SEVERE OBESITY DUE TO EXCESS CALORIES WITHOUT SERIOUS COMORBIDITY WITH BODY MASS INDEX (BMI) OF 40.0 TO 44.9 IN ADULT (H): ICD-10-CM

## 2024-07-29 DIAGNOSIS — Z98.84 BARIATRIC SURGERY STATUS: ICD-10-CM

## 2024-07-29 RX ORDER — LUTEIN 10 MG
1 TABLET ORAL DAILY
COMMUNITY

## 2024-07-29 RX ORDER — NALTREXONE HYDROCHLORIDE 50 MG/1
50 TABLET, FILM COATED ORAL
Qty: 90 TABLET | Refills: 0 | Status: SHIPPED | OUTPATIENT
Start: 2024-07-29 | End: 2024-09-23

## 2024-07-29 ASSESSMENT — PAIN SCALES - GENERAL: PAINLEVEL: NO PAIN (0)

## 2024-07-29 NOTE — Clinical Note
Worked to reduce some supplements (patient had been told this by Janie in May - and agreeable after hearing again today).  Has not increased naltrexone to 1.5tab, but I think she just needs to move her 1 tab later in the day given she has worst appetite management in christopher.; May consider 1/2 tab midday if appetite in the afternoon returns with this change. Derick Hathaway in Sept.   No visit follow up scheduled with Medication Therapy Management - as needed if naltrexone not effective with this change as you decide Mag.  Estephania

## 2024-07-29 NOTE — PATIENT INSTRUCTIONS
"Recommendations from MTM Pharmacist visit:                                                    MTM (medication therapy management) is a service provided by a clinical pharmacist designed to help you get the most of out of your medicines.  You may be sent a phone or email survey evaluating today's visit.  Please provide feedback you have for the service he received today if you are able.    Move naltrexone 50 mg tab - 1 tab at supper time to help with nighttime cravings.  Stop calcium/vitD combo tab - take calcium only tab (calcium citrate 500 mg or Tums 500 mg - calcium Carbonate twice daily ok)   Stop elderberry + vitD supplement - poor data and getting enough vitamin C in multivitamin   Stop Vitamin C supplement - sufficient in multivitamin  Increase to 1 tab multivitamin twice daily   Stop AREDs (duplicating in multivitamin)- add lutein 10mg once daily instead  Reduce turmeric to as needed  to reduce risk of side effects  Stop cod liver oil + vitamin D - replace with fish oil only 1000mg once daily     Follow-up: 9/23 with Mag Soto  As needed with Estephania Walker, [facility] Medication Therapy Management pharmacist        It was great speaking with you today.  I value your experience and would be very thankful for your time in providing feedback in our clinic survey. In the next few days, you may receive an email or text message from [employer] with a link to a survey related to your  clinical pharmacist.\"     To schedule another MTM appointment, please call the clinic directly ([facility] Phone Number: [phone] (schedules for [facility] and [facility] - providers, dietitians, health coaches) or you may call the MTM scheduling line at [phone] or toll-free at [phone].     My Clinical Pharmacist's contact information:                                                      Please feel free to contact me with any questions or concerns you have.    "   Estephania Walker, Pharm D., MPH    Medication Therapy Management Pharmacist   [facility]          Meal Replacement Shake Options:   *Protein Shake Criteria: no more than 210 Calories, at least 20 grams of protein, and less than 10 grams of sugar   Premier Protein (160 Calories, 30 g protein)  Slim Fast Advanced Nutrition (180 Calories, 20 g protein)  Muscle Milk, lactose-free, 17 oz bottle (210 Calories, 30 g protein)  Integrated Supplements, no artificial sugars (110 Calories, 20 g protein)  Boost/Ensure Max (160 calories, 30 gm protein)   Fairlife Protein Shakes (160-230 calories, 26-42 gm protein)  Aldi's [facility] Protein Powder (180 calories, 30 gm protein)   Orgain Protein Shakes (130-160 calories, 20-26 gm protein)     Meal Replacement Bar Options:  Quest Protein Bars (190 Calories, 20 g protein)  Built Bar (170 Calories, 15-20 g protein)  One Protein Bar (210 calories, 20 g protein)  Athol Signature Protein Bar ([employer]) (190 Calories, 21 g protein)  Pure Protein Bars (180 Calories, 21 g protein)    Low Calorie Frozen Meal:  Healthy Choice Power Bowls  Lean Cuisine  Smart Ones  Rubén Willard      ---------------------------------------------------------------  Tips to Increase the Protein in Your Diet  You may need more protein in your diet to help you heal from an illness, surgery or wound. Extra protein can also help you gain weight. Here are some ideas for adding high-protein foods to your meals.  Meat and fish  Add chopped cooked meat to vegetables, salads, casseroles, soups, sauces and biscuit dough.  Use in omelets, soufflés, quiches, sandwich fillings and chicken or turkey stuffing.  Wrap in pie crust or biscuit dough to make a turnover.  Add to stuffed baked potatoes.  Make a dip with diced meat or flaked fish mixed with sour cream and spices.  Chopped, hard-cooked eggs  Add to salads.  Use for snacks and sandwich filling.  High-protein milk  To make  high-protein milk, mix 1 quart whole milk with 1 cup powdered milk.  Add to cream soups, mashed potatoes, scrambled eggs, cereals and dried eggnog mix.  Use as an ingredient in puddings, custards, hot chocolate, milk shakes and pancakes.  Powdered milk  If you don't have any high-protein milk on hand, you can use powdered milk. Add 3 tablespoons to:  gravies, sauces, cream soups, mayonnaise  casseroles, meat patties, meatloaf, tuna salad, deviled ham  scalloped or mashed potatoes, creamed spinach  scrambled eggs, egg salad  cereals  yogurt, milk drinks, ice cream, frozen desserts, puddings, custards.  Add 4 to 6 tablespoons powdered milk to make:  cream sauces  breads, muffins, pancakes, waffles, cookies, cakes  cream pies, frostings, cake fillings  fruit cobblers, bread or rice pudding, gelatin desserts.  For high-protein eggnog, add 3 to 6 tablespoons powdered milk to prepared eggnog.  Hard or soft cheese  Melt on sandwiches, breads, tortillas, hamburgers, hot dogs, other meats, vegetables, eggs and pies.  Grate into soups, chili, sauces, casseroles, vegetables, potatoes, rice, noodles or meatloaf.  Eat with toast or crackers, or melt for jose c dip.  Cottage cheese or ricotta cheese  Mix with or scoop on top of fruits and vegetables.  Add to casseroles, lasagna, spaghetti, noodles and egg dishes (omelets, scrambled eggs, soufflés).  Use in gelatin, pudding-type desserts, cheesecake and pancake batter.  Use to stuff crepes, pasta shells or manicotti.  Fruit yogurt  Blend with fruits for a fruit smoothie.  Use as a dip for fruits and vegetables.  Scoop on top of pancakes or waffles.  Tofu  Blend silken tofu with fruits and juices for a smoothie.  Add chunks of firm tofu to soups and stews, or crumble into meatloaf.  Blend dried onion soup mix into soft or silken tofu for dip.  Use pureed silken tofu for part of the mayonnaise, sour cream, cream cheese or ricotta cheese called for in recipes.  Beans  Use cooked beans  or peas in soups, casseroles, pasta, tacos and burritos.  Nuts and seeds  Note: For children under 3, discuss with the child's care team.  Use in casseroles, breads, muffins, pancakes, cookies and waffles.  Sprinkle on fruits, cereals, ice cream, yogurt, vegetables and salads.  Mix with raisins, dried fruits and chocolate chips for a snack.  Nut butters  Note: For children under 3, discuss with the child's care team.  Spread on sandwiches, toast, muffins, crackers, waffles, pancakes and fruit slices.  Use as a dip for raw vegetables.  Blend with milk drinks, or swirl through ice cream, yogurt or hot cereal.  Nutrition supplements (nutrition bars, drinks and powders)  Add powders to milk drinks and desserts.  Mix with ice cream, milk and fruit for a high-protein milk shake.    For informational purposes only. Not to replace the advice of your health care provider. Clinically reviewed by Jennifer Schroeder RD, MARCELINA, and the Clinical Nutrition Service Line. Copyright   2005 [facility]. All rights reserved. [employer] 851074 - REV 04/24.      -----------------------------------------------------------------------------------------------------------------  Learning About High-Protein Foods  What foods are high in protein?     The foods you eat contain nutrients, such as vitamins and minerals. Protein is a nutrient. Your body needs the right amount to stay healthy and work as it should. You can use the list below to help you make choices about which foods to eat.  Here are some examples of foods that are high in protein.  Dairy and dairy alternatives  Cheese  Milk  Soy milk  Yogurt (especially Greek)  Meat  Beef  Chicken  Ham  Lamb  Lunch meat  Pork  Sausage  Turkey  Other protein foods  Beans (black, garbanzo, kidney, lima)  Eggs  Hummus  Lentils  Nuts  Peanut butter and other nut butters  Peas  Soybeans  Tofu  Veggie or soy jason (Check the nutrition label for the amount of protein in each  "serving.)  Seafood  Anchovies  Cod  Crab  Halibut  [place]  Sardines  Shrimp  Tilapia  [place]  Tuna  Protein supplements  Bars (Check the nutrition label for the amount of protein in each serving.)  Drinks  Powders  Work with your doctor to find out how much of this nutrient you need. Depending on your health, you may need more or less of it in your diet.  Where can you learn more?  Go to https://www.[employer].net/patiented  Enter P335 in the search box to learn more about \"Learning About High-Protein Foods.\"  Current as of: September 20, 2023               Content Version: 14.0    5905-8670 [employer].   Care instructions adapted under license by your healthcare professional. If you have questions about a medical condition or this instruction, always ask your healthcare professional. [employer] disclaims any warranty or liability for your use of this information.         "

## 2024-07-29 NOTE — PROGRESS NOTES
Medication Therapy Management (MTM) Encounter    ASSESSMENT:                            Medication Adherence/Access: See below for considerations      Weight Management /s/p sleeve gastrectomy 2015: given naltrexone peak in 60 min and T1/2 4 hours, suspect naltrexone wearing off before night time cravings/snacking. Recommend moving 1 tab naltrexone to evening meal to support patient goal of limiting meds and help support goals. If appetite increases in the afternoon given this change to not having naltrexone with lunch, may consider cange to twice daily dosing (titrate 1/2 tab per dose). See below for vitamin assessment.        Supplements: vitamin d found in several supplements patient taking not realizing they had vitD in them. Patient agreeable to reduce duplicate meds and reduce use of meds with poor data or risk of side effects.    Supplementation after Sleeve Gastrectomy:  Chewable Multivitamin with iron daily    1 tab twice daily   2. Calcium citrate 6518-7237 mg daily in 2 to 3 divided doses      At least 2 servings dairy daily - likely 1000 mg calcium supplement sufficient.  3. Vitamin D at least 3,000 units daily from all sources of vitamin D (multivitamin, calcium, etc)   Too high - reduce to multivitamin contents only.  4. Vitamin B12 500 mcg daily either as sublingual tablet or liquid or can ask about 1,000 mcg injection monthly at the 1 most post op visit with provider  Stable given labs    PLAN:                            Move naltrexone 50 mg tab - 1 tab at supper time to help with nighttime cravings.  Stop calcium/vitD combo tab - take calcium only tab (calcium citrate 500 mg or Tums 500 mg - calcium Carbonate twice daily ok)   Stop elderberry + vitD supplement - poor data and getting enough vitamin C in multivitamin   Stop Vitamin C supplement - sufficient in multivitamin  Increase to 1 tab multivitamin twice daily   Stop AREDs (duplicating in multivitamin)- add lutein 10mg once daily  instead  Reduce turmeric to as needed  to reduce risk of side effects  Stop cod liver oil + vitamin D - replace with fish oil only 1000mg once daily     Follow-up: 9/23 with Mag Soto  As needed with Estephania Walker [facility] Medication Therapy Management pharmacist    SUBJECTIVE/OBJECTIVE:                          Alta Miller is a 58 year old female seen for an initial visit. She was referred to me from Mag Soto .      Reason for visit: Medication Therapy Management - weight management .    Allergies/ADRs: Reviewed in chart  Past Medical History: Reviewed in chart  Tobacco: She reports that she has never smoked. She has never used smokeless tobacco.  Alcohol: 1 drink/week more;  more during the summer (1-2 drinks per day when up at the cabin)      Medication Adherence/Access: no issues reported. Prefers not to take meds unless needed. High belief in supplements - follows with naturopath and has felt better for 5 years since seeing them.    Weight Management /s/p sleeve gastrectomy 2015  Naltrexone 50 mg - 1.5 tab once daily (patient taking 1 tab daily at lunch)    Last Return Bariatric Surgery visit with Mag Soto 5/23/24.   Last visit with LIZZY - Janie Dean RD, MARCELINA 5/24/23    Supplements:  B12 - 2500 mcg twice weekly  Calcium/VitD 500 mg/1000 international units - 2 tabs once daily  Multivitamin - supposed to take 2 tabs daily per RD, but worried too much with other vitamins  B1- 100mg once weekly      Denies side effects. Patient feels currently that able to manage appetite most of the day and eat well. But at night, most difficult to manage food cravings and over eating.    Nutrition/Eating Habits: working on goals set with Janie 5/23/24  Have milk 2x/day or have a protein drink daily  Separate fluids and meals by 30 minutes  Practice alternative activities to evening snacking   .    Exercise/Activity: walks dog daily.   Medications Tried/Failed:  Per visit notes 5/24/24:  "Mag Soto   Phentermine:   Stop taking in 2021 due to elevated blood pressure  Topiramate:     SE: Hives  GLP-1:             not covered.   Naltrexone:      Currently on.    Wellbutrin:       Stopped due to SE.   Metformin:       Was prescribed but never tried in past, was unsure if this was safe.  Contrave:        No AOM coverage   Qsymia:           No AOM coverage     Initial Consult Weight: 276     Current weight today: 267 lbs 0 oz  Cumulative Weight Loss: -9 lb, -3.2% from baseline    Wt Readings from Last 4 Encounters:   07/29/24 121.1 kg (267 lb)   07/08/24 121.1 kg (267 lb)   05/24/24 119.3 kg (263 lb)   05/23/24 119.3 kg (263 lb)     Estimated body mass index is 41.82 kg/m  as calculated from the following:    Height as of this encounter: 1.702 m (5' 7\").    Weight as of this encounter: 121.1 kg (267 lb).    Lab Results   Component Value Date    A1C 5.4 04/04/2023    GLC 95 07/08/2024     07/08/2024    POTASSIUM 4.2 07/08/2024    ALPHONSO 9.8 07/08/2024    CHLORIDE 103 07/08/2024    CO2 28 07/08/2024    BUN 10.8 07/08/2024    CR 0.94 07/08/2024    GFRESTIMATED 71 07/08/2024    CHOL 205 (H) 10/08/2021    LDL 79 10/08/2021     10/08/2021    TRIG 91 10/08/2021    VITDT 82 (H) 06/26/2024    B12 1,171 06/26/2024    TSH 1.07 07/08/2024     Liver Function Studies -   Recent Labs   Lab Test 12/01/23  1248   PROTTOTAL 6.7   ALBUMIN 4.3   BILITOTAL 0.6   ALKPHOS 101   AST 28   ALT 20         Supplements:   Vitamin C - 500 mg as needed   Elderberry+ vitamin C + vitamin D - daily as needed during cold/flu season  Turmeric 2250 mg / 3 caps - takes 2 caps once daily - inflammation  Cod liver 1000 mg + Vitamin D 400 international units once daily   Adam Phosphoricum 6X HPUS - stress, simple nervous tension, headaches  Preservision AREDs2 mini gel - 1 tab daily (fam history of macular degeneration)    Patient is not interested in stopping/changing supplements. Follows with naturopath and has strong belief in " "these. Agreeable to reduce/simplify given history of elevations in vitamin D and unclear of source. Denies side effects       Today's Vitals: Ht 1.702 m (5' 7\")   Wt 121.1 kg (267 lb)   LMP 05/18/2010   BMI 41.82 kg/m    ----------------      I spent 31 minutes with this patient today. All changes were made via collaborative practice agreement with Mag Soto. A copy of the visit note was provided to the patient's provider(s).    A summary of these recommendations was sent via [employer].    Estephania Walker, Pharm D., MPH    Medication Therapy Management Pharmacist   [facility]      Telemedicine Visit Details  Type of service:  Telephone visit - video not working for patient.  Start Time:  11:07 AM  End Time:  11:38 AM     Medication Therapy Recommendations  Obesity, Class III, BMI 40-49.9 (morbid obesity) (H)    Current Medication: naltrexone (DEPADE/REVIA) 50 MG tablet (Discontinued)   Rationale: Does not understand instructions - Adherence - Adherence   Recommendation: Provide Education   Status: Patient Agreed - Adherence/Education         Takes dietary supplements    Current Medication: CENTRUM OR TABS   Rationale: Dose too high - Dosage too high - Safety   Recommendation: Decrease Dose - decrease vitamin D   Status: Patient Agreed - Adherence/Education            "

## 2024-07-29 NOTE — NURSING NOTE
Current patient location: [facility] [address] [address]    Is the patient currently in the state of [place]? YES    Visit mode:VIDEO    If the visit is dropped, the patient can be reconnected by: VIDEO VISIT: Text to cell phone:   Telephone Information:   Mobile [phone]       Will anyone else be joining the visit? NO  (If patient encounters technical issues they should call [phone]    How would you like to obtain your AVS? MyChart    Are changes needed to the allergy or medication list? No    Are refills needed on medications prescribed by this physician? NO    Reason for visit: Consult    [place] JETT

## 2024-07-29 NOTE — LETTER
7/29/2024      RE: Jenae [place]  [address] [place] [address]       Dear Colleague,    Thank you for the opportunity to participate in the care of your patient, Jenae Miller, at the [facility] [place] at [facility]. Please see a copy of my visit note below.    Medication Therapy Management (MTM) Encounter    ASSESSMENT:                            Medication Adherence/Access: See below for considerations      Weight Management /s/p sleeve gastrectomy 2015: given naltrexone peak in 60 min and T1/2 4 hours, suspect naltrexone wearing off before night time cravings/snacking. Recommend moving 1 tab naltrexone to evening meal to support patient goal of limiting meds and help support goals. If appetite increases in the afternoon given this change to not having naltrexone with lunch, may consider cange to twice daily dosing (titrate 1/2 tab per dose). See below for vitamin assessment.        Supplements: vitamin d found in several supplements patient taking not realizing they had vitD in them. Patient agreeable to reduce duplicate meds and reduce use of meds with poor data or risk of side effects.    Supplementation after Sleeve Gastrectomy:  Chewable Multivitamin with iron daily    1 tab twice daily   2. Calcium citrate [id number] mg daily in 2 to 3 divided doses      At least 2 servings dairy daily - likely 1000 mg calcium supplement sufficient.  3. Vitamin D at least 3,000 units daily from all sources of vitamin D (multivitamin, calcium, etc)   Too high - reduce to multivitamin contents only.  4. Vitamin B12 500 mcg daily either as sublingual tablet or liquid or can ask about 1,000 mcg injection monthly at the 1 most post op visit with provider  Stable given labs    PLAN:                            Move naltrexone 50 mg tab - 1 tab at supper time to help with nighttime cravings.  Stop calcium/vitD combo tab - take calcium  only tab (calcium citrate 500 mg or Tums 500 mg - calcium Carbonate twice daily ok)   Stop elderberry + vitD supplement - poor data and getting enough vitamin C in multivitamin   Stop Vitamin C supplement - sufficient in multivitamin  Increase to 1 tab multivitamin twice daily   Stop AREDs (duplicating in multivitamin)- add lutein 10mg once daily instead  Reduce turmeric to as needed  to reduce risk of side effects  Stop cod liver oil + vitamin D - replace with fish oil only 1000mg once daily     Follow-up: 9/23 with Mag Soto  As needed with Estephania Walker, [facility] Medication Therapy Management pharmacist    SUBJECTIVE/OBJECTIVE:                          Alta Miller is a 58 year old female seen for an initial visit. She was referred to me from Mag Soto .      Reason for visit: Medication Therapy Management - weight management .    Allergies/ADRs: Reviewed in chart  Past Medical History: Reviewed in chart  Tobacco: She reports that she has never smoked. She has never used smokeless tobacco.  Alcohol: 1 drink/week more;  more during the summer (1-2 drinks per day when up at the cabin)      Medication Adherence/Access: no issues reported. Prefers not to take meds unless needed. High belief in supplements - follows with naturopath and has felt better for 5 years since seeing them.    Weight Management/s/p sleeve gastrectomy 2015  Naltrexone 50 mg - 1.5 tab once daily (patient taking 1 tab daily at lunch)    Last Return Bariatric Surgery visit with Mag Soto 5/23/24.   Last visit with RD - Janie Dean RD, LD 5/24/23    Supplements:  B12 - 2500 mcg twice weekly  Calcium/VitD 500 mg/1000 international units - 2 tabs once daily  Multivitamin - supposed to take 2 tabs daily per RD, but worried too much with other vitamins  B1- 100mg once weekly      Denies side effects. Patient feels currently that able to manage appetite most of the day and eat well. But at night, most difficult to  "manage food cravings and over eating.    Nutrition/Eating Habits: working on goals set with Janie 5/23/24  Have milk 2x/day or have a protein drink daily  Separate fluids and meals by 30 minutes  Practice alternative activities to evening snacking   .    Exercise/Activity: walks dog daily.   Medications Tried/Failed:  Per visit notes 5/24/24: Mag Soto   Phentermine:   Stop taking in 2021 due to elevated blood pressure  Topiramate:     SE: Hives  GLP-1:             not covered.   Naltrexone:      Currently on.    Wellbutrin:       Stopped due to SE.   Metformin:       Was prescribed but never tried in past, was unsure if this was safe.  Contrave:        No AOM coverage   Qsymia:           No AOM coverage     Initial Consult Weight: 276     Current weight today: 267 lbs 0 oz  Cumulative Weight Loss: -9 lb, -3.2% from baseline    Wt Readings from Last 4 Encounters:   07/29/24 121.1 kg (267 lb)   07/08/24 121.1 kg (267 lb)   05/24/24 119.3 kg (263 lb)   05/23/24 119.3 kg (263 lb)     Estimated body mass index is 41.82 kg/m  as calculated from the following:    Height as of this encounter: 1.702 m (5' 7\").    Weight as of this encounter: 121.1 kg (267 lb).    Lab Results   Component Value Date    A1C 5.4 04/04/2023    GLC 95 07/08/2024     07/08/2024    POTASSIUM 4.2 07/08/2024    ALPHONSO 9.8 07/08/2024    CHLORIDE 103 07/08/2024    CO2 28 07/08/2024    BUN 10.8 07/08/2024    CR 0.94 07/08/2024    GFRESTIMATED 71 07/08/2024    CHOL 205 (H) 10/08/2021    LDL 79 10/08/2021     10/08/2021    TRIG 91 10/08/2021    VITDT 82 (H) 06/26/2024    B12 1,171 06/26/2024    TSH 1.07 07/08/2024     Liver Function Studies -   Recent Labs   Lab Test 12/01/23  1248   PROTTOTAL 6.7   ALBUMIN 4.3   BILITOTAL 0.6   ALKPHOS 101   AST 28   ALT 20         Supplements:   Vitamin C - 500 mg as needed   Elderberry+ vitamin C + vitamin D - daily as needed during cold/flu season  Turmeric 2250 mg / 3 caps - takes 2 caps once daily - " "inflammation  Cod liver 1000 mg + Vitamin D 400 international units once daily   Adam Phosphoricum 6X HPUS - stress, simple nervous tension, headaches  Preservision AREDs2 mini gel - 1 tab daily (fam history of macular degeneration)    Patient is not interested in stopping/changing supplements. Follows with naturopath and has strong belief in these. Agreeable to reduce/simplify given history of elevations in vitamin D and unclear of source. Denies side effects       Today's Vitals: Ht 1.702 m (5' 7\")   Wt 121.1 kg (267 lb)   LMP 05/18/2010   BMI 41.82 kg/m    ----------------      I spent 31 minutes with this patient today. All changes were made via collaborative practice agreement with Mag Soto. A copy of the visit note was provided to the patient's provider(s).    A summary of these recommendations was sent via [employer].    Estephania Walker, Pharm D., MPH    Medication Therapy Management Pharmacist   [facility]      Telemedicine Visit Details  Type of service:  Telephone visit - video not working for patient.  Start Time:  11:07 AM  End Time:  11:38 AM     Medication Therapy Recommendations  Obesity, Class III, BMI 40-49.9 (morbid obesity) (H)    Current Medication: naltrexone (DEPADE/REVIA) 50 MG tablet (Discontinued)   Rationale: Does not understand instructions - Adherence - Adherence   Recommendation: Provide Education   Status: Patient Agreed - Adherence/Education         Takes dietary supplements    Current Medication: CENTRUM OR TABS   Rationale: Dose too high - Dosage too high - Safety   Recommendation: Decrease Dose - decrease vitamin D   Status: Patient Agreed - Adherence/Education                Please do not hesitate to contact me if you have any questions/concerns.     Sincerely,     Estephania Walker, [facility]  "

## 2024-09-06 ENCOUNTER — ALLIED HEALTH/NURSE VISIT (OUTPATIENT)
Dept: FAMILY MEDICINE | Facility: CLINIC | Age: 59
End: 2024-09-06
Payer: COMMERCIAL

## 2024-09-06 DIAGNOSIS — Z23 ENCOUNTER FOR IMMUNIZATION: Primary | ICD-10-CM

## 2024-09-06 PROCEDURE — 90471 IMMUNIZATION ADMIN: CPT

## 2024-09-06 PROCEDURE — 90746 HEPB VACCINE 3 DOSE ADULT IM: CPT

## 2024-09-06 PROCEDURE — 99207 PR NO CHARGE NURSE ONLY: CPT

## 2024-09-06 NOTE — PROGRESS NOTES
Prior to immunization administration, verified patients identity using patient s name and date of birth. Please see Immunization Activity for additional information.     Is the patient's temperature normal (100.5 or less)? Yes     Patient MEETS CRITERIA. PROCEED with vaccine administration.          9/6/2024   Hepatitis B   Have you had a serious reaction to a hepatitis B vaccine or to something in a hepatitis B vaccine, including yeast)? No   Are you getting kidney dialysis (either peritoneal or hemodialysis)? No   Do you have an allergy to latex? No            Patient MEETS CRITERIA. PROCEED with vaccine administration.        Patient instructed to remain in clinic for 15 minutes afterwards, and to report any adverse reactions.      Link to Ancillary Visit Immunization Standing Orders SmartSet     Screening performed by Lindsay Lopez CMA on 9/6/2024 at 11:59 AM.

## 2024-09-13 NOTE — PROGRESS NOTES
"Alta is a 59 year old who is being evaluated via a billable video visit.      The patient has been notified of following:     \"This video visit will be conducted via a call between you and your physician/provider. We have found that certain health care needs can be provided without the need for an in-person physical exam.  This service lets us provide the care you need with a video conversation.  If a prescription is necessary we can send it directly to your pharmacy.  If lab work is needed we can place an order for that and you can then stop by our lab to have the test done at a later time.    Video visits are billed at different rates depending on your insurance coverage.  Please reach out to your insurance provider with any questions.    If during the course of the call the physician/provider feels a video visit is not appropriate, you will not be charged for this service.\"    Patient has given verbal consent for Video visit? Yes    How would you like to obtain your AVS? MyChart    If the video visit is dropped, the invitation should be resent by: Text to cell phone: [phone]    Will anyone else be joining your video visit? No    I    Video-Visit Details    Type of service:  Video Visit    Originating Location (pt. Location): Home    Distant Location (provider location):   Off-Site - Provider Home Office    Platform used for Video Visit: Haptic[employer]    Video Start Time: 8:02 AM    Video End Time: 8:26 AM    2024      Return Medical Weight Management Note     Jenae Miller  MRN:  [id number]  :  1965    Assessment & Plan   Problem List Items Addressed This Visit       Bariatric surgery status     2015 Gastric Sleeve  July Janet So continues to have good restriction.  No current concerns         Class 3 severe obesity due to excess calories without serious comorbidity with body mass index (BMI) of 40.0 to 44.9 in adult (H) - Primary     Continues to struggle with " consistency when it comes to taking naltrexone.  Her anxiety is increased and she is a stress eater.  Naltrexone works well for appetite control and cravings but causes a mental block because patient really wants those snacks at night.  She is working with her therapist monthly.      She will start to see the dietitian every 2 months work on accountability and support.  We talked about taking her naltrexone at dinner along with her turmeric.  Alta knows naltrexone works she just has to decide when she is ready to use it                   Relevant Medications    naltrexone (DEPADE/REVIA) 50 MG tablet    Postsurgical malabsorption     Continues taking vitamins             AOM Considerations:  Phentermine:   Stop taking in 2021 due to elevated blood pressure  Topiramate:     SE: Hives  GLP-1:             not covered.   Naltrexone:      Currently on.    Wellbutrin:       Stopped due to SE.   Metformin:       Was prescribed but never tried in past, was unsure if this was safe.  Contrave:        No AOM coverage   Qsymia:           No AOM coverage     PATIENT INSTRUCTIONS:  See dietician every 2 months  Take naltrexone with Tumeric daily at supper.      Goals:  Continue seeming the therapist monthly    Follow up:    Call [phone] to schedule next visit in March.      30 minutes spent on the date of the encounter doing chart review, history and exam, result review, counseling, developing plan of care, documentation, and further activities as noted      INTERVAL HISTORY: Last seen 5/23/2024.  06/16/2015 Gastric Sleeve  July Janet.  Alta will continue naltrexone but take consistently.  We discussed long-term effects of this medication and safety profile.  Saw MTM 7/29/2024.    Has not increased naltrexone to 1.5 tab, MTM recommended moving her 1 tab later in the day. (Supper.)      Pt:  Haven't danielle taking the Naltrexone.  It is a mental thing.  If she takes it she will not be able to eat anything she likes.   It really  curbs her appetite.  She likes to have her evening snacks.  She takes the jump naltrexone this occurs her snacking.    Has a lot of anxiety right now.  Craving more.  Wants to have the snacks.    Feels she has to lose weight.  She is frustrated.  Can can eat 1 cup of food per meal .Alta snacks between meals.  This is stress induced.  Is not physical hunger.  It is emotional hunger.  Is seeing therapist once a month. Is not on any medication for anxiety.    AURELIA:4      Is having some hoarding issues which got worse during the pandemic.  Has quit getting new things.  Working through this with therapist      VITAMINS AND MINERALS: Per diet visit 2/2024 Vitamins at dinner- takes tumeric at dinner- most days.    2 Multivitamin with Minerals (Centrum Adult; lunch)  500 mg Calcium With Vitamin D (AM + dinner)  2000 international unit(s) Vitamin D -  MWF  1000 mcg Vitamin B-12 sublingual - MWF  No iron needed; post-menopausal  Vitamin B1 100 mg 1 time per week    WEIGHT METRICS:  Body mass index is 42.29 kg/m .   Current Weight: 270 lb (122.5 kg)  Last Visits Weight: 263 lb (119.3 kg) (Simultaneous filing. User may not have seen previous data.)  Initial Weight (lbs): 279 lbs (Simultaneous filing. User may not have seen previous data.)  Cumulative weight loss (lbs): 9  Weight Loss Percentage: 3.23%    Wt Readings from Last 10 Encounters:   09/23/24 270 lb (122.5 kg)   07/29/24 267 lb (121.1 kg)   07/08/24 267 lb (121.1 kg)   05/24/24 263 lb (119.3 kg)   05/23/24 263 lb (119.3 kg)   02/16/24 264 lb (119.7 kg)   01/23/24 261 lb (118.4 kg)   11/22/23 259 lb (117.5 kg)   10/20/23 255 lb (115.7 kg)   09/29/23 255 lb 6.4 oz (115.8 kg)                 9/18/2024     2:32 PM   Weight Loss Medication History Reviewed With Patient   Which weight loss medications are you currently taking on a regular basis? Naltrexone   Are you having any side effects from the weight loss medication that we have prescribed you? No           9/18/2024      "2:32 PM   Changes and Difficulties   I have made the following changes to my diet since my last visit: Drinking more milk   With regards to my diet, I am still struggling with: Snacking   I have made the following changes to my activity/exercise since my last visit: None   With regards to my activity/exercise, I am still struggling with: Getting to the gym and yoga       LABS:  Reviewed in Epic    BP Readings from Last 6 Encounters:   07/08/24 120/73   09/29/23 117/78   05/22/23 133/76   03/30/23 (!) 143/80   12/30/22 126/80   12/08/22 (!) 144/85       Pulse Readings from Last 6 Encounters:   07/08/24 58   09/29/23 80   05/22/23 63   03/30/23 70   12/30/22 66   12/08/22 95       PE:  Ht 5' 7\" (1.702 m)   Wt 270 lb (122.5 kg)   LMP 05/18/2010   BMI 42.29 kg/m    GENERAL: Healthy, alert and no distress  RESP: No audible wheeze, cough, or visible cyanosis.  No visible retractions or increased work of breathing.    SKIN: Visible skin clear. No significant rash, abnormal pigmentation or lesions.  PSYCH: Mentation appears normal, affect normal/bright, judgement and insight intact        "

## 2024-09-19 ENCOUNTER — OFFICE VISIT (OUTPATIENT)
Dept: VASCULAR SURGERY | Facility: CLINIC | Age: 59
End: 2024-09-19
Payer: COMMERCIAL

## 2024-09-19 DIAGNOSIS — I78.1 SPIDER TELANGIECTASIS OF SKIN: Primary | ICD-10-CM

## 2024-09-19 PROCEDURE — 36468 NJX SCLRSNT SPIDER VEINS: CPT | Performed by: SURGERY

## 2024-09-19 PROCEDURE — S9999 SALES TAX: HCPCS | Performed by: SURGERY

## 2024-09-19 NOTE — PROGRESS NOTES
Vein Clinic Sclerotherapy Note     Indications:  Scattered, small varicose veins and spider telangiectasias of cosmetic concern     Procedure:  Bilateral lower extremity cosmetic sclerotherapy     Procedure description  Details of procedure including risks of allergic reaction, deep vein thrombosis, ulceration, superficial thrombophlebitis and hyperpigmentation were discussed.  The patient voiced understanding and wished to proceed.  Informed consent was obtained.    I had the patient stand and she pointed out areas of concern over the anterior, medial lateral thighs and legs bilaterally.  There was nothing of significance on the posterior lower extremities.    I donned the Syris headlight and injected the areas that had been marked with a pen under direct vision using 0.5% polidocanol foam.  I used a total of 9 mL of foam to treat veins from the proximal thighs to the proximal calfs bilaterally.  I had her perform ankle pumps.    We cleaned her legs, had her don compression that had her walk for 10 minutes.  She will in approximately 6 weeks in follow-up.    Sclerotherapy    Date/Time: 9/19/2024 3:03 PM    Performed by: Mychal Smith MD  Authorized by: Mychal Smith MD    Time out: Immediately prior to the procedure a time out was called    Type:  Cosmetic  Session:  Full  Procedure side:  Bilateral  Solution/Amount:  .5 POLIDOCANOL  Syringes:  3  Patient tolerance:  Patient tolerated the procedure well with no immediate complications  Wrap/Hose:  David Smith MD    Dictated using Dragon voice recognition software which may result in transcription errors

## 2024-09-19 NOTE — LETTER
9/19/2024      Jenae Miller  [address] [place] [address]      Dear Colleague,    Thank you for referring your patient, Jenae Miller, to the [facility] [place]. Please see a copy of my visit note below.        Vein Clinic Sclerotherapy Note     Indications:  Scattered, small varicose veins and spider telangiectasias of cosmetic concern     Procedure:  Bilateral lower extremity cosmetic sclerotherapy     Procedure description  Details of procedure including risks of allergic reaction, deep vein thrombosis, ulceration, superficial thrombophlebitis and hyperpigmentation were discussed.  The patient voiced understanding and wished to proceed.  Informed consent was obtained.    I had the patient stand and she pointed out areas of concern over the anterior, medial lateral thighs and legs bilaterally.  There was nothing of significance on the posterior lower extremities.    I donned the Syris headlight and injected the areas that had been marked with a pen under direct vision using 0.5% polidocanol foam.  I used a total of 9 mL of foam to treat veins from the proximal thighs to the proximal calfs bilaterally.  I had her perform ankle pumps.    We cleaned her legs, had her don compression that had her walk for 10 minutes.  She will in approximately 6 weeks in follow-up.    Sclerotherapy    Date/Time: 9/19/2024 3:03 PM    Performed by: Mychal Smith MD  Authorized by: Mychal Smith MD    Time out: Immediately prior to the procedure a time out was called    Type:  Cosmetic  Session:  Full  Procedure side:  Bilateral  Solution/Amount:  .5 POLIDOCANOL  Syringes:  3  Patient tolerance:  Patient tolerated the procedure well with no immediate complications  Wrap/Hose:  David Smith MD    Dictated using Dragon voice recognition software which may result in transcription errors      Again, thank you for allowing me to participate in  the care of your patient.        Sincerely,        Mychal Smith MD

## 2024-09-23 ENCOUNTER — VIRTUAL VISIT (OUTPATIENT)
Dept: SURGERY | Facility: CLINIC | Age: 59
End: 2024-09-23
Payer: COMMERCIAL

## 2024-09-23 VITALS — BODY MASS INDEX: 42.38 KG/M2 | HEIGHT: 67 IN | WEIGHT: 270 LBS

## 2024-09-23 DIAGNOSIS — E66.01 CLASS 3 SEVERE OBESITY DUE TO EXCESS CALORIES WITHOUT SERIOUS COMORBIDITY WITH BODY MASS INDEX (BMI) OF 40.0 TO 44.9 IN ADULT (H): Primary | ICD-10-CM

## 2024-09-23 DIAGNOSIS — Z98.84 BARIATRIC SURGERY STATUS: ICD-10-CM

## 2024-09-23 DIAGNOSIS — K91.2 POSTSURGICAL MALABSORPTION: ICD-10-CM

## 2024-09-23 DIAGNOSIS — E66.813 CLASS 3 SEVERE OBESITY DUE TO EXCESS CALORIES WITHOUT SERIOUS COMORBIDITY WITH BODY MASS INDEX (BMI) OF 40.0 TO 44.9 IN ADULT (H): Primary | ICD-10-CM

## 2024-09-23 PROCEDURE — 99214 OFFICE O/P EST MOD 30 MIN: CPT | Mod: 95 | Performed by: PHYSICIAN ASSISTANT

## 2024-09-23 RX ORDER — NALTREXONE HYDROCHLORIDE 50 MG/1
50 TABLET, FILM COATED ORAL
Qty: 90 TABLET | Refills: 1 | Status: SHIPPED | OUTPATIENT
Start: 2024-09-23

## 2024-09-23 NOTE — ASSESSMENT & PLAN NOTE
Continues to struggle with consistency when it comes to taking naltrexone.  Her anxiety is increased and she is a stress eater.  Naltrexone works well for appetite control and cravings but causes a mental block because patient really wants those snacks at night.  She is working with her therapist monthly.      She will start to see the dietitian every 2 months work on accountability and support.  We talked about taking her naltrexone at dinner along with her turmeric.  Alta knows naltrexone works she just has to decide when she is ready to use it

## 2024-09-23 NOTE — PATIENT INSTRUCTIONS
Nice to talk with you today. Below is the plan discussed.-  AMBER Hathaway      Pt Instructions:  See dietician every 2 months  Take naltrexone with Tumeric daily at supper.      Goals:  Continue seeming the therapist monthly    Follow up:    Call [phone] to schedule next visit in March.

## 2024-09-25 ENCOUNTER — VIRTUAL VISIT (OUTPATIENT)
Dept: SURGERY | Facility: CLINIC | Age: 59
End: 2024-09-25
Payer: COMMERCIAL

## 2024-09-25 VITALS — HEIGHT: 67 IN | WEIGHT: 270 LBS | BODY MASS INDEX: 42.38 KG/M2

## 2024-09-25 DIAGNOSIS — E66.813 CLASS 3 SEVERE OBESITY DUE TO EXCESS CALORIES WITHOUT SERIOUS COMORBIDITY WITH BODY MASS INDEX (BMI) OF 40.0 TO 44.9 IN ADULT (H): Primary | ICD-10-CM

## 2024-09-25 DIAGNOSIS — K91.2 POSTSURGICAL MALABSORPTION: ICD-10-CM

## 2024-09-25 DIAGNOSIS — E66.01 CLASS 3 SEVERE OBESITY DUE TO EXCESS CALORIES WITHOUT SERIOUS COMORBIDITY WITH BODY MASS INDEX (BMI) OF 40.0 TO 44.9 IN ADULT (H): Primary | ICD-10-CM

## 2024-09-25 DIAGNOSIS — Z98.84 BARIATRIC SURGERY STATUS: ICD-10-CM

## 2024-09-25 PROCEDURE — 97803 MED NUTRITION INDIV SUBSEQ: CPT | Mod: 95

## 2024-09-25 NOTE — PROGRESS NOTES
"Alta is a 59 year old who is being evaluated via a billable video visit.      The patient has been notified of following:     \"This video visit will be conducted via a call between you and your physician/provider. We have found that certain health care needs can be provided without the need for an in-person physical exam.  This service lets us provide the care you need with a video conversation.  If a prescription is necessary we can send it directly to your pharmacy.  If lab work is needed we can place an order for that and you can then stop by our lab to have the test done at a later time.    Video visits are billed at different rates depending on your insurance coverage.  Please reach out to your insurance provider with any questions.    If during the course of the call the physician/provider feels a video visit is not appropriate, you will not be charged for this service.\"    Patient has given verbal consent for Video visit? Yes    How would you like to obtain your AVS? MyChart    If the video visit is dropped, the invitation should be resent by: Text to cell phone: [phone]    Will anyone else be joining your video visit? No    I    Video-Visit Details    Type of service:  Video Visit    Originating Location (pt. Location): Home    Distant Location (provider location):   Off-Site - Provider Home Office    Platform used for Video Visit: Prifloat    Video Start Time:  11:00 am    Video End Time: 11:20am    NUTRITION POST OP APPOINTMENT  DATE OF VISIT: September 25, 2024    eJnae Miller  1965  female  [id number]  59 year old     ASSESSMENT:    REASON FOR VISIT:  Jenae is a 59 year old year old female presents today for 9 year PO nutrition follow-up appointment. Patient is accompanied by self.    DIAGNOSIS:  Status post gastric sleeve surgery.  Obesity Grade III BMI >40     ANTHROPOMETRICS:  Initial Weight: 276 lbs    Height: 67\"    Current Weight: 270 lbs     BMI: 42.49 kg/(m^2).    VITAMINS AND " MINERALS:  1 Multivitamin with Minerals ([employer] Adult AM + lunch)  1200 mg Calcium With Vitamin D (TUMS; dinner)  Vitamin D held d/t labs  1000 mcg Vitamin B-12 sublingual  3x/week   Vitamin B1 100mg 1x/week    No iron needed; post-menopausal      NUTRITION HISTORY:  Breakfast: Rubén Strong croissant sandwich (egg, cheese, sausage)   Lunch: leftovers + chips   Supper: chicken pot pie  Benihana - filet mignon + fried rice + vegetables + salad + soup  steak fajitas  Snacks: [evenings] chocolate/candy, chips, cookies, yogurt-covered pretzels, chocolate covered acai, nuts   Restaurant food: 1x every other week or less  Fluids consumed: Water (90oz), soda (Coke or Mountain Dew; 12oz), coffee (8oz, creamer), herbal tea (occasionally), EtOH (1x/week; 3 drinks), milk (12oz; skim or Fair Life)  Consuming liquid calories: Yes  Protein intake: 40-60 grams/day  Tolerate regular texture food: Yes  Portion size: 1 cup or more  Take 20-30 minutes to consume each meal: Yes   Eat protein foods first: Yes  Fluids and meals separate by at least 30 minutes: Usually  Chew foods thoroughly: Yes  Tolerating diet: Yes  Drinking high protein supplements: No  Consuming snacks per day: evening snacking  Additional Information: Ongoing challenges with evening snacking/cravings. Inconsistent in Naltrexone dosing (see provider notes). Pt successful in adding milk to increase overall protein intake. She will continue to work on curbing evening snacking via both behavioral approaches as well as increased protein during the day and consistent Naltrexone.         PHYSICAL ACTIVITY:  Type: walking dog   Frequency (days per week): 7  Duration (min): 60-90    DIAGNOSIS:  Previous Nutrition Diagnosis: Altered gastrointestinal function related to alteration in gastrointestinal structure as evidenced by history of gastric sleeve surgery.- no change    Previous goals:  Have milk 2x/day or have a protein drink daily - partially met  Separate fluids and  meals by 30 minutes - improving  Practice alternative activities to evening snacking - not met    Current Nutrition Diagnosis: Altered gastrointestinal function related to alteration in gastrointestinal structure as evidenced by history of gastric sleeve surgery.    INTERVENTION:   Nutrition Prescription: Eat 3 meals a day at regular intervals. Consume 60-90 grams of protein daily. Follow post-surgical vitamin and mineral protocol.  Assessed learning needs and learning preferences.    GOALS:  Switch to Rubén Tae Delight breakfast sandwich  Take Calcium per guidelines  Take Naltrexone consistently  Add second glass of milk or protein shake  Practice alternative activities to evening snacking    Follow-Up:   Recommend q2m follow up visits to assist with lifestyle changes or per insurance.  Implementation: Discussed progress toward previous goals; reinforced importance of following bariatric lifestyle changes.    NUTRITION MONITORING AND EVALUATION:  Anticipated compliance: fair  Verbalized fair-good understanding.    Follow up: Patient to follow up in 2 months.    TIME SPENT WITH PATIENT:  20 minutes      Janie Dean RD, LD  Clinical Dietitian

## 2024-09-25 NOTE — PATIENT INSTRUCTIONS
[facility] Holm!      It was great chatting with you again today! Here's a summary of the goals we discussed:    1. Vitamins:  - Okay to take both multivitamins at the same time if preferred  - Calcium: take 600mg 2x/day OR 500mg 3x/day. Take separate from multivitamin by at least 2 hours.    2. Take your Naltrexone consistently    3. Switch to Rubén Tae Jibo breakfast sandwich    4. Increase to 2 glasses of milk each day, or switch to 1 protein shake per day    5. Keep practicing non-food alternative activities to evening snacking  - Examples: crossword puzzles, arts/crafts/hobbies, games, projects, journaling, reading, adult coloring books, meditation/deep breathing, etc      Plan on following up in 2 months. This can be scheduled via our call center at . Reach out sooner with any questions or concerns. Have a great day!      Janie Dean RD, LD  Clinical Dietitian

## 2024-09-27 ENCOUNTER — TRANSFERRED RECORDS (OUTPATIENT)
Dept: HEALTH INFORMATION MANAGEMENT | Facility: CLINIC | Age: 59
End: 2024-09-27
Payer: COMMERCIAL

## 2024-10-11 ENCOUNTER — TRANSFERRED RECORDS (OUTPATIENT)
Dept: HEALTH INFORMATION MANAGEMENT | Facility: CLINIC | Age: 59
End: 2024-10-11
Payer: COMMERCIAL

## 2024-10-23 ENCOUNTER — TRANSFERRED RECORDS (OUTPATIENT)
Dept: HEALTH INFORMATION MANAGEMENT | Facility: CLINIC | Age: 59
End: 2024-10-23
Payer: COMMERCIAL

## 2024-10-25 ENCOUNTER — OFFICE VISIT (OUTPATIENT)
Dept: VASCULAR SURGERY | Facility: CLINIC | Age: 59
End: 2024-10-25
Payer: COMMERCIAL

## 2024-10-25 DIAGNOSIS — I78.1 SPIDER TELANGIECTASIS OF SKIN: Primary | ICD-10-CM

## 2024-10-25 PROCEDURE — S9999 SALES TAX: HCPCS | Performed by: SURGERY

## 2024-10-25 PROCEDURE — 36468 NJX SCLRSNT SPIDER VEINS: CPT | Performed by: SURGERY

## 2024-10-25 NOTE — LETTER
10/25/2024      Jenae Miller  [id number]  [place] [address]      Dear Colleague,    Thank you for referring your patient, Jenae Miller, to the [facility] [place]. Please see a copy of my visit note below.        Vein Clinic Sclerotherapy Note     Indications:  Residual, proximal posterolateral calf and lateral popliteal fossa and medial ankle telangiectasias of cosmetic concern     Procedure:   Bilateral  lower extremity cosmetic sclerotherapy     Procedure description  Details of procedure including risks of allergic reaction, deep vein thrombosis, ulceration, superficial thrombophlebitis and hyperpigmentation were discussed.  The patient voiced understanding and wished to proceed.  Informed consent was obtained.    The patient was concerned about residual telangiectasias about the posterolateral proximal calf and the right greater than the left medial ankle and 1 reticular vein in the proximal anteromedial thigh.  I donned the Syris headlight and injected these areas with 0.5% polidocanol foam, using a total of 2 syringes (6 mL) of 0.5% polidocanol foam.  The area in the proximal anteromedial left thigh was not difficult to inject as it was difficult to stabilize the vein and very mobile tissues to accurately inject the solution.  I had her perform ankle pumps.  We cleaned her legs, had her don compression and then had her walk for 10 minutes.    I discussed the use of the V formula of diosmin to see if this might be of benefit with the telangiectasias.  She also wanted the opinion of Dr. Jaime Degroot regarding possible laser therapy for telangiectasias.  We gave her his card.  She will return on an as-needed basis.    Sclerotherapy    Date/Time: 10/25/2024 12:02 PM    Performed by: Mychal Smith MD  Authorized by: Mychal Smith MD    Time out: Immediately prior to the procedure a time out was called    Type:  Cosmetic  Session:   Limited  Procedure side:  Bilateral  Solution/Amount:  .5 POLIDOCANOL  Syringes:  2  Patient tolerance:  Patient tolerated the procedure well with no immediate complications  Wrap/Hose:  David Smith MD    Dictated using Dragon voice recognition software which may result in transcription errors      Again, thank you for allowing me to participate in the care of your patient.        Sincerely,        Mychal Smith MD

## 2024-10-25 NOTE — PROGRESS NOTES
Vein Clinic Sclerotherapy Note     Indications:  Residual, proximal posterolateral calf and lateral popliteal fossa and medial ankle telangiectasias of cosmetic concern     Procedure:   Bilateral  lower extremity cosmetic sclerotherapy     Procedure description  Details of procedure including risks of allergic reaction, deep vein thrombosis, ulceration, superficial thrombophlebitis and hyperpigmentation were discussed.  The patient voiced understanding and wished to proceed.  Informed consent was obtained.    The patient was concerned about residual telangiectasias about the posterolateral proximal calf and the right greater than the left medial ankle and 1 reticular vein in the proximal anteromedial thigh.  I donned the Syris headlight and injected these areas with 0.5% polidocanol foam, using a total of 2 syringes (6 mL) of 0.5% polidocanol foam.  The area in the proximal anteromedial left thigh was not difficult to inject as it was difficult to stabilize the vein and very mobile tissues to accurately inject the solution.  I had her perform ankle pumps.  We cleaned her legs, had her don compression and then had her walk for 10 minutes.    I discussed the use of the V formula of diosmin to see if this might be of benefit with the telangiectasias.  She also wanted the opinion of Dr. Jaime Degroot regarding possible laser therapy for telangiectasias.  We gave her his card.  She will return on an as-needed basis.    Sclerotherapy    Date/Time: 10/25/2024 12:02 PM    Performed by: Mychal Smith MD  Authorized by: Mychal Smith MD    Time out: Immediately prior to the procedure a time out was called    Type:  Cosmetic  Session:  Limited  Procedure side:  Bilateral  Solution/Amount:  .5 POLIDOCANOL  Syringes:  2  Patient tolerance:  Patient tolerated the procedure well with no immediate complications  Wrap/Hose:  David Smith MD    Dictated using Dragon voice recognition  software which may result in transcription errors

## 2024-10-25 NOTE — PATIENT INSTRUCTIONS
SCLEROTHERAPY AFTER CARE  Immediately:  After treatment, walk for 10-15 minutes before getting in your car.  If your trip home is more than 1 hour, stop and walk around for 5-10 minutes. Avoid sitting or standing for extended periods.   First 24 hours: Wear your compression continuously, even while in bed. After the 24 hours, you may shower if you want to. Put your hose back on, unless you are going to bed. You should NOT wear compression to bed after the first 24 hours. You may fly the next day, but wear your compression.   For 5 days: Wear the compression hose for waking hours only. You may continue to wear them longer than 5 days if you prefer.   For days 5-7: Walking is encouraged, as it promotes efficient circulation in your veins. You may do activities that raise your heart rate, but do NOT run, jog, do high impact aerobics, or weight lifting. After 7 days, no activity restrictions.    Shaving: Wait a few days to shave or apply lotion.   Bathing: Do NOT take hot baths or sit in a hot tub for 7-10 days.    For 1 year: Wear SPF 30 sunscreen on your legs when in the sun. This is very important! It helps prevent darkening of the skin at the injection sites.   Medications: You may resume your usual medications, including aspirin or ibuprofen.    Common Things to Expect  -  Compression must be worn for the first 24 hours and then during the day for 5-7 days.    -  If larger veins are treated with ultrasound-guided sclerotherapy, you will have redness, firmness, tenderness, and swelling.  This firmness and tenderness may take 3-6 months to resolve. Ibuprofen and compression hose will aid in this process.    -  You will have bruising that can last up to 3 weeks. Most fading of the veins will occur between 3 and 6 weeks after treatment.    -  You may notice brown discoloration (hyperpigmentation) at the treatment site.  This should fade with time, but will take 3 months to 1 year to fully heal.     -  Some treated  veins may look darker because of trapped blood within the vein. This trapped blood can be removed at a minimum of 1 month following treatment. Larger veins are more likely to develop trapped blood.    -  It is very important for you to use at least SPF 30 sunscreen in order to help prevent the discoloration of your skin.    -  Migraines rarely occur following sclerotherapy, but are more likely in patients with a history of migraines.  Treat as you would any other migraine.     Vein  V  Formula:   Active wound-Take (1) 1000 mg capsule twice daily.   Closed wounds/No wound-Take (1) 1000 mg capsule once per day  *Order from [employer] OR [employer] ([phone])*

## 2024-11-21 ENCOUNTER — TELEPHONE (OUTPATIENT)
Dept: VASCULAR SURGERY | Facility: CLINIC | Age: 59
End: 2024-11-21
Payer: COMMERCIAL

## 2024-11-21 NOTE — TELEPHONE ENCOUNTER
PATIENT WANTS 2 PAIRS KNEE HIGHS (BLACK, CLOSED TOE, SIZE 4 X-WIDE CALF) and 2 PAIRS THIGH HIGH (BLACK, CLOSED TOE, ONE SIZE 4 and ONE SIZE 5). PT WANTS ORDERED THROUGH [facility]. DONE BY JANINE. 11/21/24

## 2024-11-26 ENCOUNTER — TRANSFERRED RECORDS (OUTPATIENT)
Dept: HEALTH INFORMATION MANAGEMENT | Facility: CLINIC | Age: 59
End: 2024-11-26
Payer: COMMERCIAL

## 2024-12-02 NOTE — PROGRESS NOTES
"History of Present Illness - Jenae Miller is a very pleasant 59 year old female last seen on 9/29/2023, and was seen for the first time for evaluation of the upper airway due to her RITU.    To review, this all started due to several years of poor sleep quality.  She had an overnight sleep study about 13 years ago and was tried on a CPAP and also had weight loss surgery and therefore didn't need the CPAP anymore.  But she has gained weight and her RITU came back which led to her re-evaluation by [facility].  She was seen by Dr Golden at [facility], and also had a home sleep study in March of 2023.    She had a septoplasty in 2015 that worked perfectly, and a tonsillectomy in 2005.  She has returned to re-evaluate the nose and airway, as well as possible cerumen removal.    She tells me that things have been great with the sleep and she still does not use CPAP.  But she notes that some of her RITU symptoms seem to be coming back, but she is trying to lose weight.  Also, about a month ago she started to get some post nasal drainage and it has continued.  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)    Bariatric surgery status    HX: breast cancer    Primary osteoarthritis of both knees    Monoallelic mutation of CHEK2 gene in female patient    Class 3 severe obesity due to excess calories without serious comorbidity with body mass index (BMI) of 40.0 to 44.9 in adult (H)    Postsurgical malabsorption    History of colonic polyps    RITU (obstructive sleep apnea)       /86 (BP Location: Left arm, Patient Position: Sitting, Cuff Size: Adult Large)   Pulse 70   Ht 1.714 m (5' 7.48\")   Wt 121.7 kg (268 lb 6.4 oz)   LMP 05/18/2010   SpO2 100%   BMI 41.44 kg/m        General - The patient is well nourished and well " developed, and appears to have good nutritional status.  Alert and oriented to person and place, answers questions and cooperates with examination appropriately.   Head and Face - Normocephalic and atraumatic, with no gross asymmetry noted of the contour of the facial features.  The facial nerve is intact, with strong symmetric movements.  Eyes - Extraocular movements intact, and the pupils were reactive to light.  Sclera were not icteric or injected, conjunctiva were pink and moist.  Mouth - Examination of the oral cavity shows pink, healthy, moist mucosa.  No lesions or ulceration noted.  The dentition are in good repair.  The tongue is mobile and midline.    Cerumen Removal    Physical Exam and Procedure    BOTH sides had cerumen impaction.  Therefore, I positioned them in the examination chair in a semi-supine position, beginning with the right side.  I used the binocular surgical microscope to perform cerumen removal.  I began by using a cerumen loop to gently lift the edges of the cerumen mass away from the walls of the external canal.  Once I did this, I was able to suction away fragments of wax and debris using suction.  Once the mass was loose enough, the entire plug was pulled from the canal.  The tympanic membrane was intact, no sign of perforation or middle ear effusion.    I turned my attention to the contralateral side once again using the binocular surgical microscope to perform cerumen removal.  I began by using a cerumen loop to gently lift the edges of the cerumen mass away from the walls of the external canal.  Once I did this, I was able to suction away fragments of wax and debris using suction.  Once the mass was loose enough, the entire plug was pulled from the canal.  The tympanic membrane was intact, no sign of perforation or middle ear effusion.    A/P - Jenae Miller  (G47.33) RITU (obstructive sleep apnea)  (primary encounter diagnosis)  (J31.0) Chronic rhinitis  (H61.23) Bilateral  impacted cerumen    The patient has had their cerumen procedurally removed today.  I have discussed ear care at home, including avoiding qtips or any other object placed in the canal.  I have also discussed that over the counter cerumen kits like Debrox or Cerumenex can be useful.    If no other issues, follow up with me in 6 months for an ear check.    For the nose, I will try her on Flonase, but have also given her  NeilMed rinse kit in case we try Budeonside irrigation in the future.  Contact me if the Flonase does not help.

## 2024-12-04 ENCOUNTER — OFFICE VISIT (OUTPATIENT)
Dept: FAMILY MEDICINE | Facility: CLINIC | Age: 59
End: 2024-12-04
Payer: COMMERCIAL

## 2024-12-04 VITALS
TEMPERATURE: 98.2 F | HEART RATE: 73 BPM | HEIGHT: 67 IN | BODY MASS INDEX: 42.53 KG/M2 | RESPIRATION RATE: 16 BRPM | DIASTOLIC BLOOD PRESSURE: 82 MMHG | WEIGHT: 271 LBS | OXYGEN SATURATION: 99 % | SYSTOLIC BLOOD PRESSURE: 128 MMHG

## 2024-12-04 DIAGNOSIS — E66.01 CLASS 3 SEVERE OBESITY DUE TO EXCESS CALORIES WITHOUT SERIOUS COMORBIDITY WITH BODY MASS INDEX (BMI) OF 40.0 TO 44.9 IN ADULT (H): ICD-10-CM

## 2024-12-04 DIAGNOSIS — E66.813 CLASS 3 SEVERE OBESITY DUE TO EXCESS CALORIES WITHOUT SERIOUS COMORBIDITY WITH BODY MASS INDEX (BMI) OF 40.0 TO 44.9 IN ADULT (H): ICD-10-CM

## 2024-12-04 DIAGNOSIS — Z01.818 PREOP GENERAL PHYSICAL EXAM: Primary | ICD-10-CM

## 2024-12-04 DIAGNOSIS — T85.43XD BREAST IMPLANT RUPTURE, SUBSEQUENT ENCOUNTER: ICD-10-CM

## 2024-12-04 PROBLEM — R87.810 CERVICAL HIGH RISK HPV (HUMAN PAPILLOMAVIRUS) TEST POSITIVE: Status: ACTIVE | Noted: 2022-08-01

## 2024-12-04 LAB
ANION GAP SERPL CALCULATED.3IONS-SCNC: 8 MMOL/L (ref 7–15)
BUN SERPL-MCNC: 15.8 MG/DL (ref 6–20)
CALCIUM SERPL-MCNC: 10 MG/DL (ref 8.8–10.4)
CHLORIDE SERPL-SCNC: 102 MMOL/L (ref 98–107)
CREAT SERPL-MCNC: 1 MG/DL (ref 0.51–0.95)
EGFRCR SERPLBLD CKD-EPI 2021: 65 ML/MIN/1.73M2
GLUCOSE SERPL-MCNC: 90 MG/DL (ref 70–99)
HCO3 SERPL-SCNC: 28 MMOL/L (ref 22–29)
POTASSIUM SERPL-SCNC: 4.3 MMOL/L (ref 3.4–5.3)
SODIUM SERPL-SCNC: 138 MMOL/L (ref 135–145)

## 2024-12-04 PROCEDURE — 99214 OFFICE O/P EST MOD 30 MIN: CPT | Performed by: FAMILY MEDICINE

## 2024-12-04 PROCEDURE — 36415 COLL VENOUS BLD VENIPUNCTURE: CPT | Performed by: FAMILY MEDICINE

## 2024-12-04 PROCEDURE — 80048 BASIC METABOLIC PNL TOTAL CA: CPT | Performed by: FAMILY MEDICINE

## 2024-12-04 PROCEDURE — G2211 COMPLEX E/M VISIT ADD ON: HCPCS | Performed by: FAMILY MEDICINE

## 2024-12-04 RX ORDER — FLUTICASONE PROPIONATE 50 MCG
1 SPRAY, SUSPENSION (ML) NASAL DAILY
COMMUNITY
Start: 2024-12-04

## 2024-12-04 NOTE — PATIENT INSTRUCTIONS
How to Take Your Medication Before Surgery  Preoperative Medication Instructions   Antiplatelet or Anticoagulation Medication Instructions   - Patient is on no antiplatelet or anticoagulation medications.    Additional Medication Instructions  Take all scheduled medications on the day of surgery EXCEPT for modifications listed below:  -Hold Naltrexone for 4 weeks before surgery    - Herbal medications and vitamins: DO NOT TAKE 14 days prior to surgery.     -hold losartan-hydrochlorothiazide on day of surgery     Patient Education   Preparing for Your Surgery  For Adults  Getting started  In most cases, a nurse will call to review your health history and instructions. They will give you an arrival time based on your scheduled surgery time. Please be ready to share:  Your doctor's clinic name and phone number  Your medical, surgical, and anesthesia history  A list of allergies and sensitivities  A list of medicines, including herbal treatments and over-the-counter drugs  Whether the patient has a legal guardian (ask how to send us the papers in advance)  Note: You may not receive a call if you were seen at our PAC (Preoperative Assessment Center).  Please tell us if you're pregnant--or if there's any chance you might be pregnant. Some surgeries may injure a fetus (unborn baby), so they require a pregnancy test. Surgeries that are safe for a fetus don't always need a test, and you can choose whether to have one.   Preparing for surgery  Within 10 to 30 days of surgery: Have a pre-op exam (sometimes called an H&P, or History and Physical). This can be done at a clinic or pre-operative center.  If you're having a , you may not need this exam. Talk to your care team.  At your pre-op exam, talk to your care team about all medicines you take. (This includes CBD oil and any drugs, such as THC, marijuana, and other forms of cannabis.) If you need to stop any medicine before surgery, ask when to start taking it  again.  This is for your safety. Many medicines and drugs can make you bleed too much during surgery. Some change how well surgery (anesthesia) drugs work.  Call your insurance company to let them know you're having surgery. (If you don't have insurance, call [phone].)  Call your clinic if there's any change in your health. This includes a scrape or scratch near the surgery site, or any signs of a cold (sore throat, runny nose, cough, rash, fever).  Eating and drinking guidelines  For your safety: Unless your surgeon tells you otherwise, follow the guidelines below.  Eat and drink as normal until 8 hours before you arrive for surgery. After that, no food or milk. You can spit out gum when you arrive.  Drink clear liquids until 2 hours before you arrive. These are liquids you can see through, like water, Gatorade, and Propel Water. They also include plain black coffee and tea (no cream or milk).  No alcohol for 24 hours before you arrive. The night before surgery, stop any drinks that contain THC.  If your care team tells you to take medicine on the morning of surgery, it's okay to take it with a sip of water. No other medicines or drugs are allowed (including CBD oil)--follow your care team's instructions.  If you have questions the day of surgery, call your hospital or surgery center.   Preventing infection  Shower or bathe the night before and the morning of surgery. Follow the instructions your clinic gave you. (If no instructions, use regular soap.)  Don't shave or clip hair near your surgery site. We'll remove the hair if needed.  Don't smoke or vape the morning of surgery. No chewing tobacco for 6 hours before you arrive. A nicotine patch is okay. You may spit out nicotine gum when you arrive.  For some surgeries, the surgeon will tell you to fully quit smoking and nicotine.  We will make every effort to keep you safe from infection. We will:  Clean our hands often with soap and water (or an alcohol-based  hand rub).  Clean the skin at your surgery site with a special soap that kills germs.  Give you a special gown to keep you warm. (Cold raises the risk of infection.)  Wear hair covers, masks, gowns, and gloves during surgery.  Give antibiotic medicine, if prescribed. Not all surgeries need this medicine.  What to bring on the day of surgery  Photo ID and insurance card  Copy of your health care directive, if you have one  Glasses and hearing aids (bring cases)  You can't wear contacts during surgery  Inhaler and eye drops, if you use them (tell us about these when you arrive)  CPAP machine or breathing device, if you use them  A few personal items, if spending the night  If you have . . .  A pacemaker, ICD (cardiac defibrillator), or other implant: Bring the ID card.  An implanted stimulator: Bring the remote control.  A legal guardian: Bring a copy of the certified (court-stamped) guardianship papers.  Please remove any jewelry, including body piercings. Leave jewelry and other valuables at home.  If you're going home the day of surgery  You must have a responsible adult drive you home. They should stay with you overnight as well.  If you don't have someone to stay with you, and you aren't safe to go home alone, we may keep you overnight. Insurance often won't pay for this.  After surgery  If it's hard to control your pain or you need more pain medicine, please call your surgeon's office.  Questions?   If you have any questions for your care team, list them here:   ____________________________________________________________________________________________________________________________________________________________________________________________________________________________________________________________  For informational purposes only. Not to replace the advice of your health care provider. Copyright   2003, 2019 [facility]. All rights reserved. Clinically reviewed by Jean Herrera MD.  [employer] [id number] - REV 08/24.

## 2024-12-04 NOTE — PROGRESS NOTES
Preoperative Evaluation  [facility]  [address] [place] [address]  [place] [address]  Phone: [phone]  Primary Provider: Lola Bray MD  Pre-op Performing Provider: Lola Bray MD  Dec 4, 2024              12/1/2024   Surgical Information   What procedure is being done? Breast reconstruction for ruptured implant    Facility or Hospital where procedure/surgery will be performed: [facility]    Who is doing the procedure / surgery? Dr. Yassine Sherman    Date of surgery / procedure: 12/31/24    Time of surgery / procedure: 9:30 am    Where do you plan to recover after surgery? at home alone        Patient-reported     Fax number for surgical facility: Note does not need to be faxed, will be available electronically in Epic.    Assessment & Plan     The proposed surgical procedure is considered INTERMEDIATE risk.    Preop general physical exam  Breast implant rupture, subsequent encounter    Class 3 severe obesity due to excess calories without serious comorbidity with body mass index (BMI) of 40.0 to 44.9 in adult (H)      - No identified additional risk factors other than previously addressed    Preoperative Medication Instructions  Antiplatelet or Anticoagulation Medication Instructions   - Patient is on no antiplatelet or anticoagulation medications.    Additional Medication Instructions  Take all scheduled medications on the day of surgery EXCEPT for modifications listed below:  -Hold Naltrexone for 4 weeks before surgery    - Herbal medications and vitamins: DO NOT TAKE 14 days prior to surgery.  -hold losartan-hydrochlorothiazide the day of surgery     Recommendation  Approval given to proceed with proposed procedure, without further diagnostic evaluation.    Fernando Holm is a 59 year old, presenting for the following:  Pre-Op Exam          12/4/2024     9:24 AM   Additional Questions   Roomed by Etelvina FLORES CMA   Accompanied by Self         12/4/2024     9:24 AM   Patient  Reported Additional Medications   Patient reports taking the following new medications None     HPI related to upcoming procedure: Jenae Miller is a 59 year old female who presents with left breast implant rupture, without symptoms.         12/1/2024   Pre-Op Questionnaire   Have you ever had a heart attack or stroke? No    Have you ever had surgery on your heart or blood vessels, such as a stent placement, a coronary artery bypass, or surgery on an artery in your head, neck, heart, or legs? No    Do you have chest pain with activity? No    Do you have a history of heart failure? No    Do you currently have a cold, bronchitis or symptoms of other infection? No    Do you have a cough, shortness of breath, or wheezing? No    Do you or anyone in your family have previous history of blood clots? No    Do you or does anyone in your family have a serious bleeding problem such as prolonged bleeding following surgeries or cuts? No    Have you ever had problems with anemia or been told to take iron pills? No    Have you had any abnormal blood loss such as black, tarry or bloody stools, or abnormal vaginal bleeding? No    Have you ever had a blood transfusion? No    Are you willing to have a blood transfusion if it is medically needed before, during, or after your surgery? Yes    Have you or any of your relatives ever had problems with anesthesia? No    Do you have sleep apnea, excessive snoring or daytime drowsiness? (!) YES    Do you have a CPAP machine? (!) NO      Do you have any artifical heart valves or other implanted medical devices like a pacemaker, defibrillator, or continuous glucose monitor? No    Do you have artificial joints? No    Are you allergic to latex? No        Patient-reported     Health Care Directive  Patient does not have a Health Care Directive: Discussed advance care planning with patient; information given to patient to review.    Preoperative Review of    reviewed - no record of  controlled substances prescribed.       Status of Chronic Conditions:  See problem list for active medical problems.  Problems all longstanding and stable, except as noted/documented.  See ROS for pertinent symptoms related to these conditions.    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 Provider to review      RITU (obstructive sleep apnea)      Priority: Medium    Cervical high risk HPV (human papillomavirus) test positive 08/01/2022     Priority: Medium     08/2022 NIL/HPV 18+  09/2022 Edelstein: DEIDRA 1  09/2023 LSIL/HPV 18+  10/2023 colposcopy, DEIDRA 1  09/2024 NIL (unable to add on HPV)    Provider Plan: Pap and HPV due 9/2025      HX: breast cancer 05/24/2016     Priority: Medium     2010: Stage1 Gr 3 infiltrating ductal Carcinoma with associated DCIS... Nuclear grade 3 without necrosis    ER[+] KS [+]  Her2[-].    Had bilateral mastectomy 2010 and treated with tamoxifen  MRI every 5 years as part of surveilance      Bariatric surgery status 07/07/2015     Priority: Medium     06/16/2015 Gastric Sleeve Dr. 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 Dr. Chayo Chan.     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 side    GERD (gastroesophageal reflux disease)     Hyperlipidemia     Hypertension 02/2015    LGSIL (low grade squamous intraepithelial dysplasia) 2004    Malrotation colon (H)     Monoallelic mutation of CHEK2 gene in female patient 02/14/2022    RITU (obstructive sleep apnea)     Osteoarthritis of knee 12/30/2019    Postsurgical malabsorption 03/30/2023    Sleep apnea     TMJ (dislocation of temporomandibular joint)      Past Surgical History:   Procedure Laterality Date    ABDOMEN SURGERY  06/17/2015    Vertical sleeve gastrectomy    BREAST BIOPSY, RT/LT  2001    lt 2001, rt  2010    COLONOSCOPY  12/13/2012    Repeat Colonoscopy in 5 yrs.    COSMETIC SURGERY  2003 and 2014    CRYOCAUTERY OF CERVIX      [place]    CYSTOSCOPY,DIL URETHRAL STRICTURE      age 7    HC DILATION/CURETTAGE DIAG/THER NON OB  2006    HC TOOTH EXTRACTION W/FORCEP  1986    MASTECTOMY, BILATERAL  05/26/2010    rt sided breast ca  with reconstruction    TONSILLECTOMY & ADENOIDECTOMY  2005    VASCULAR SURGERY  04/27/2017    Closures, phlebectomies, sclerotherapy    ZZ COLONOSCOPY THRU STOMA W BIOPSY/CAUTERY TUMOR/POLYP/LESION  1996    Dr. Jackson 8/30/96, '01 Repeat 2008    ZZ COLONOSCOPY THRU STOMA, DIAGNOSTIC  05/2007          Current Outpatient Medications   Medication Sig Dispense Refill    CALCIUM CITRATE PO Take 500 mg by mouth 2 times daily      CENTRUM OR TABS Take 1 tablet by mouth 2 times daily      cod liver oil CAPS capsule Take 1 capsule by mouth daily      Cyanocobalamin (VITAMIN B 12 PO) Take 2,500 mcg by mouth twice a week      CycloSPORINE (RESTASIS OP) Apply  to eye 2 times daily.      fluticasone (FLONASE) 50 MCG/ACT nasal spray Spray 1 spray into both nostrils daily.      losartan-hydrochlorothiazide (HYZAAR) 50-12.5 MG tablet Take 1 tablet by  mouth daily 90 tablet 3    Lutein 10 MG TABS Take 1 tablet by mouth daily      naltrexone (DEPADE/REVIA) 50 MG tablet Take 1 tablet (50 mg) by mouth daily (with dinner). 90 tablet 1    thiamine (VITAMIN B1) 100 MG tablet Take 100 mg by mouth once a week      TURMERIC CURCUMIN PO Take 1 capsule by mouth daily as needed (pain, inflammation)         Allergies   Allergen Reactions    Sulfa Antibiotics Anaphylaxis     Eyes red and swollen    Topiramate Hives    Adhesive Tape Rash     surgical tape  Other reaction(s): Hives    Hydrocodone-Acetaminophen Hives     Other reaction(s): Flushing, Hives  Liquid  Lortab only, reports pill with no issues      Influenza Vaccine Live      Bell's Palsy    Penicillins Hives    Taxotere Hives        Social History     Tobacco Use    Smoking status: Never     Passive exposure: Never    Smokeless tobacco: Never   Substance Use Topics    Alcohol use: Yes     Comment: One or two drinks a week     Family History   Problem Relation Age of Onset    Obesity Mother     Breast Cancer Mother 57         at 60 of breast cancer    Colon Polyps Father         large polyps    Diabetes Father     Hypertension Father     Obesity Father     Unknown/Adopted Maternal Grandmother     Unknown/Adopted Maternal Grandfather     Cancer Paternal Grandmother         stomach    Obesity Sister     Obesity Sister     Breast Cancer Other     Obesity Sister      History   Drug Use No             Review of Systems  CONSTITUTIONAL: NEGATIVE for fever, chills, change in weight  INTEGUMENTARY/SKIN: NEGATIVE for worrisome rashes, moles or lesions  EYES: NEGATIVE for vision changes or irritation  ENT/MOUTH: post nasal drip   RESP: NEGATIVE for significant cough or SOB  BREAST: NEGATIVE for masses, tenderness or discharge  CV: NEGATIVE for chest pain, palpitations or peripheral edema  GI: hemorrhoids; hx of malrotation of intestines; NEGATIVE for nausea, abdominal pain, heartburn, or change in bowel habits  : NEGATIVE  "for frequency, dysuria, or hematuria  MUSCULOSKELETAL: knee degenerative joint disease   NEURO: NEGATIVE for weakness, dizziness or paresthesias  ENDOCRINE: NEGATIVE for temperature intolerance, skin/hair changes  HEME: NEGATIVE for bleeding problems  PSYCHIATRIC: NEGATIVE for changes in mood or affect    Objective    /82 (BP Location: Left arm, Patient Position: Sitting, Cuff Size: Adult Large)   Pulse 73   Temp 98.2  F (36.8  C) (Oral)   Resp 16   Ht 1.71 m (5' 7.32\")   Wt 122.9 kg (271 lb)   LMP 05/18/2010   SpO2 99%   BMI 42.04 kg/m     Estimated body mass index is 42.04 kg/m  as calculated from the following:    Height as of this encounter: 1.71 m (5' 7.32\").    Weight as of this encounter: 122.9 kg (271 lb).  Physical Exam  GENERAL: alert and no distress  EYES: Eyes grossly normal to inspection, PERRL and conjunctivae and sclerae normal  HENT: ear canals and TM's normal, nose and mouth without ulcers or lesions  NECK: no adenopathy, no asymmetry, masses, or scars  RESP: lungs clear to auscultation - no rales, rhonchi or wheezes  CV: regular rate and rhythm, normal S1 S2, no S3 or S4, no murmur, click or rub, no peripheral edema  ABDOMEN: soft, nontender, no hepatosplenomegaly, no masses and bowel sounds normal  MS: no gross musculoskeletal defects noted, no edema  SKIN: no suspicious lesions or rashes  NEURO: Normal strength and tone, mentation intact and speech normal  PSYCH: mentation appears normal, affect normal/bright    Recent Labs   Lab Test 07/08/24  1201 06/26/24  1206   HGB  --  13.2   PLT  --  263     --    POTASSIUM 4.2  --    CR 0.94  --         Diagnostics  Labs pending at this time.  Results will be reviewed when available.   No EKG required, no history of coronary heart disease, significant arrhythmia, peripheral arterial disease or other structural heart disease.    Revised Cardiac Risk Index (RCRI)  The patient has the following serious cardiovascular risks for " perioperative complications:   - No serious cardiac risks = 0 points     RCRI Interpretation: 0 points: Class I (very low risk - 0.4% complication rate)         Signed Electronically by: Lola Bray MD  A copy of this evaluation report is provided to the requesting physician.

## 2024-12-09 ENCOUNTER — OFFICE VISIT (OUTPATIENT)
Dept: OTOLARYNGOLOGY | Facility: CLINIC | Age: 59
End: 2024-12-09
Payer: COMMERCIAL

## 2024-12-09 ENCOUNTER — OFFICE VISIT (OUTPATIENT)
Dept: AUDIOLOGY | Facility: CLINIC | Age: 59
End: 2024-12-09
Payer: COMMERCIAL

## 2024-12-09 VITALS
HEIGHT: 67 IN | DIASTOLIC BLOOD PRESSURE: 86 MMHG | HEART RATE: 70 BPM | SYSTOLIC BLOOD PRESSURE: 138 MMHG | WEIGHT: 268.4 LBS | BODY MASS INDEX: 42.12 KG/M2 | OXYGEN SATURATION: 100 %

## 2024-12-09 DIAGNOSIS — H61.23 BILATERAL IMPACTED CERUMEN: ICD-10-CM

## 2024-12-09 DIAGNOSIS — J31.0 CHRONIC RHINITIS: ICD-10-CM

## 2024-12-09 DIAGNOSIS — H61.23 IMPACTED CERUMEN OF BOTH EARS: Primary | ICD-10-CM

## 2024-12-09 DIAGNOSIS — G47.33 OSA (OBSTRUCTIVE SLEEP APNEA): Primary | ICD-10-CM

## 2024-12-09 PROCEDURE — 92557 COMPREHENSIVE HEARING TEST: CPT

## 2024-12-09 PROCEDURE — 92550 TYMPANOMETRY & REFLEX THRESH: CPT

## 2024-12-09 RX ORDER — LORATADINE 10 MG/1
10 TABLET ORAL DAILY
COMMUNITY

## 2024-12-09 RX ORDER — FLUTICASONE PROPIONATE 50 MCG
2 SPRAY, SUSPENSION (ML) NASAL DAILY
Qty: 15.8 ML | Refills: 11 | Status: SHIPPED | OUTPATIENT
Start: 2024-12-09

## 2024-12-09 NOTE — LETTER
12/9/2024      Jenae Miller  [address] [place] [address]      Dear Colleague,    Thank you for referring your patient, Jenae Miller, to the [facility]. Please see a copy of my visit note below.    History of Present Illness - Jenae Miller is a very pleasant 59 year old female last seen on 9/29/2023, and was seen for the first time for evaluation of the upper airway due to her RITU.    To review, this all started due to several years of poor sleep quality.  She had an overnight sleep study about 13 years ago and was tried on a CPAP and also had weight loss surgery and therefore didn't need the CPAP anymore.  But she has gained weight and her RITU came back which led to her re-evaluation by [facility].  She was seen by Dr Golden at [facility], and also had a home sleep study in March of 2023.    She had a septoplasty in 2015 that worked perfectly, and a tonsillectomy in 2005.  She has returned to re-evaluate the nose and airway, as well as possible cerumen removal.    She tells me that things have been great with the sleep and she still does not use CPAP.  But she notes that some of her RITU symptoms seem to be coming back, but she is trying to lose weight.  Also, about a month ago she started to get some post nasal drainage and it has continued.  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)     Bariatric surgery status     HX: breast cancer     Primary osteoarthritis of both knees     Monoallelic mutation of CHEK2 gene in female patient     Class 3 severe obesity due to excess calories without serious comorbidity with body mass index (BMI) of 40.0 to 44.9 in adult (H)     Postsurgical malabsorption     History of colonic polyps     RITU (obstructive sleep apnea)       BP  "138/86 (BP Location: Left arm, Patient Position: Sitting, Cuff Size: Adult Large)   Pulse 70   Ht 1.714 m (5' 7.48\")   Wt 121.7 kg (268 lb 6.4 oz)   LMP 05/18/2010   SpO2 100%   BMI 41.44 kg/m        General - The patient is well nourished and well developed, and appears to have good nutritional status.  Alert and oriented to person and place, answers questions and cooperates with examination appropriately.   Head and Face - Normocephalic and atraumatic, with no gross asymmetry noted of the contour of the facial features.  The facial nerve is intact, with strong symmetric movements.  Eyes - Extraocular movements intact, and the pupils were reactive to light.  Sclera were not icteric or injected, conjunctiva were pink and moist.  Mouth - Examination of the oral cavity shows pink, healthy, moist mucosa.  No lesions or ulceration noted.  The dentition are in good repair.  The tongue is mobile and midline.    Cerumen Removal    Physical Exam and Procedure    BOTH sides had cerumen impaction.  Therefore, I positioned them in the examination chair in a semi-supine position, beginning with the right side.  I used the binocular surgical microscope to perform cerumen removal.  I began by using a cerumen loop to gently lift the edges of the cerumen mass away from the walls of the external canal.  Once I did this, I was able to suction away fragments of wax and debris using suction.  Once the mass was loose enough, the entire plug was pulled from the canal.  The tympanic membrane was intact, no sign of perforation or middle ear effusion.    I turned my attention to the contralateral side once again using the binocular surgical microscope to perform cerumen removal.  I began by using a cerumen loop to gently lift the edges of the cerumen mass away from the walls of the external canal.  Once I did this, I was able to suction away fragments of wax and debris using suction.  Once the mass was loose enough, the entire plug " was pulled from the canal.  The tympanic membrane was intact, no sign of perforation or middle ear effusion.    A/P - Jenaevirgen Miller  (G47.33) RITU (obstructive sleep apnea)  (primary encounter diagnosis)  (J31.0) Chronic rhinitis  (H61.23) Bilateral impacted cerumen    The patient has had their cerumen procedurally removed today.  I have discussed ear care at home, including avoiding qtips or any other object placed in the canal.  I have also discussed that over the counter cerumen kits like Debrox or Cerumenex can be useful.    If no other issues, follow up with me in 6 months for an ear check.    For the nose, I will try her on Flonase, but have also given her  NeilMed rinse kit in case we try Budeonside irrigation in the future.  Contact me if the Flonase does not help.      Again, thank you for allowing me to participate in the care of your patient.        Sincerely,        Jose Antonio Miller MD

## 2024-12-09 NOTE — PROGRESS NOTES
AUDIOLOGY REPORT:    Patient was referred to [facility] from ENT by Dr. Miller for a hearing examination. Patient is here today for a baseline audiogram; she has no immediate concerns. Alta denies pain, pressure, drainage, history of ear surgeries and a history of noise exposure. She does report a possible family history of hearing loss through her mother who started to lose her hearing at 60 along with one episode of room-spinning dizziness in 2005. The patient was not accompanied to today's appointment     Testing:    Otoscopy:   Otoscopic exam indicates ears are clear of cerumen bilaterally. Prior to testing, cerumen removal was performed by Dr. Miller.      Tympanograms:    RIGHT: normal eardrum mobility     LEFT:   normal eardrum mobility    Reflexes (reported by stimulus ear): 1000 Hz  RIGHT: Ipsilateral is present at normal levels  RIGHT: Contralateral is present at normal levels  LEFT:   Ipsilateral is present at normal levels  LEFT:   Contralateral is present at normal levels    Thresholds:   Pure Tone Thresholds assessed using conventional audiometry with good  reliability from 250-8000 Hz bilaterally using insert earphones and circumaural headphones     RIGHT:  normal hearing sensitivity at frequencies tested    LEFT:    normal hearing sensitivity at frequencies tested    Speech Reception Threshold:    RIGHT: 5 dB HL    LEFT:   5 dB HL  Speech Reception Thresholds are in good agreement with pure tone thresholds    Word Recognition Score:     RIGHT: 100% at 50 dB HL using NU-6 recorded word list.    LEFT:   100% at 50 dB HL using NU-6 recorded word list.    Discussed results with the patient.     Fredy Yuen, [facility]  Licensed Audiologist  [place] #[id number]    12/9/2024

## 2024-12-11 NOTE — PROGRESS NOTES
"Alta is a 59 year old who is being evaluated via a billable video visit.      The patient has been notified of following:     \"This video visit will be conducted via a call between you and your physician/provider. We have found that certain health care needs can be provided without the need for an in-person physical exam.  This service lets us provide the care you need with a video conversation.  If a prescription is necessary we can send it directly to your pharmacy.  If lab work is needed we can place an order for that and you can then stop by our lab to have the test done at a later time.    Video visits are billed at different rates depending on your insurance coverage.  Please reach out to your insurance provider with any questions.    If during the course of the call the physician/provider feels a video visit is not appropriate, you will not be charged for this service.\"    Patient has given verbal consent for Video visit? {YES-NO  Default Yes:4444::\"Yes\"}    How would you like to obtain your AVS? {AVS Preference:978563}    If the video visit is dropped, the invitation should be resent by: {video visit invitation:570630}    Will anyone else be joining your video visit? {:334575}    I{If patient encounters technical issues they should call [phone] :988018}    Video-Visit Details    Type of service:  Video Visit    Originating Location (pt. Location): {video visit patient location:536958::\"Home\"}    Distant Location (provider location):   {[place] Provider Location:932108}    Platform used for Video Visit: {Virtual Visit Platforms:571808::\"[employer]\"}    Video Start Time: {video visit start/end time for provider to select:171794}    Video End Time:{video visit start/end time for provider to select:721953}       NUTRITION POST OP APPOINTMENT  DATE OF VISIT: December 11, 2024    Jenae Miller  1965  female  [id number]  59 year old     ASSESSMENT:    REASON FOR VISIT:  Jenae is a 59 year old year old " "female presents today for 9 year + ~6 months PO nutrition follow-up appointment. Patient is accompanied by {ACCOMPANIED BY (ADULT):014288}.    DIAGNOSIS:  Status post gastric sleeve surgery.  Obesity {.:903107}     ANTHROPOMETRICS:  Initial Weight: 291.5 lb   Height: 67\"   Current Weight: *** lb per pt    BMI: ***  kg/(m^2).    VITAMINS AND MINERALS:  ***1 Multivitamin with Minerals (Centrum Adult AM + lunch)  1200 mg Calcium With Vitamin D (TUMS; dinner)  Vitamin D held d/t labs  1000 mcg Vitamin B-12 sublingual  3x/week   Vitamin B1 100mg 1x/week     No iron needed; post-menopausal    MEDICATION FOR WEIGHT LOSS:  Naltrexone       NUTRITION HISTORY:  Breakfast: ***  Lunch: ***  Supper: ***  Snacks: ***  Fluids consumed: {Bar Fluids:831354}  Consuming liquid calories: {Yes & No:239594}  Protein intake: *** grams/day  Tolerate regular texture food: {Yes & No:102019}  Any foods not tolerated details: {Yes & No:062535}  If any food not tolerated: ***  Portion size: {Portion size:287212}  Take 20-30 minutes to consume each meal: {Yes & No:716780}   Eat protein foods first: {Yes & No:509030}  Fluids and meals separate by at least 30 minutes: {Yes & No:102065}  Rinsing balloon per protocol: {Yes & No:418990}  Chew foods thoroughly: {Yes & No:830811}  Tolerating diet: {Yes & No:673611}  Drinking high protein supplements: {Yes & No:753489}  Consuming snacks per day: ***  Additional Information: ***        PHYSICAL ACTIVITY:  Type: ***  Frequency (days per week): {1-7:614673}  Duration (min): ***    DIAGNOSIS:  Previous Nutrition Diagnosis: Altered gastrointestinal function related to alteration in gastrointestinal structure as evidenced by history of gastric sleeve surgery.- no change    Previous goals:  ***Switch to [employer] breakfast sandwich-  Take Calcium per guidelines-  Take Naltrexone consistently-  Add second glass of milk or protein shake-  Practice alternative activities to evening snacking-    Current " Nutrition Diagnosis: Altered gastrointestinal function related to alteration in gastrointestinal structure as evidenced by history of gastric sleeve surgery.    INTERVENTION:   Nutrition Prescription: Eat 3 meals a day at regular intervals. Consume 60-90 grams of protein daily. Follow post-surgical vitamin and mineral protocol.  Assessed learning needs and learning preferences.    GOALS:  ***    Follow-Up:   Recommend *** follow up visits to assist with lifestyle changes or per insurance.  Implementation: Discussed progress toward previous goals; reinforced importance of following bariatric lifestyle changes.    NUTRITION MONITORING AND EVALUATION:  Anticipated compliance: { :331168}  Verbalized { :880967} understanding.    Follow up: Patient to follow up in *** months.    TIME SPENT WITH PATIENT:  *** minutes  Fran Acevedo RD, MARCELINA  [facility], [place]

## 2024-12-16 ENCOUNTER — VIRTUAL VISIT (OUTPATIENT)
Dept: SURGERY | Facility: CLINIC | Age: 59
End: 2024-12-16
Payer: COMMERCIAL

## 2024-12-16 VITALS — HEIGHT: 67 IN | BODY MASS INDEX: 42.12 KG/M2 | WEIGHT: 268.4 LBS

## 2024-12-16 DIAGNOSIS — Z98.84 BARIATRIC SURGERY STATUS: ICD-10-CM

## 2024-12-16 DIAGNOSIS — K91.2 POSTSURGICAL MALABSORPTION: ICD-10-CM

## 2024-12-16 DIAGNOSIS — E66.01 CLASS 3 SEVERE OBESITY DUE TO EXCESS CALORIES WITHOUT SERIOUS COMORBIDITY WITH BODY MASS INDEX (BMI) OF 40.0 TO 44.9 IN ADULT (H): Primary | ICD-10-CM

## 2024-12-16 DIAGNOSIS — E66.813 CLASS 3 SEVERE OBESITY DUE TO EXCESS CALORIES WITHOUT SERIOUS COMORBIDITY WITH BODY MASS INDEX (BMI) OF 40.0 TO 44.9 IN ADULT (H): Primary | ICD-10-CM

## 2024-12-16 PROCEDURE — 97803 MED NUTRITION INDIV SUBSEQ: CPT | Mod: 95 | Performed by: DIETITIAN, REGISTERED

## 2024-12-16 NOTE — PROGRESS NOTES
"Alta is a 59 year old who is being evaluated via a billable video visit.      The patient has been notified of following:     \"This video visit will be conducted via a call between you and your physician/provider. We have found that certain health care needs can be provided without the need for an in-person physical exam.  This service lets us provide the care you need with a video conversation.  If a prescription is necessary we can send it directly to your pharmacy.  If lab work is needed we can place an order for that and you can then stop by our lab to have the test done at a later time.    Video visits are billed at different rates depending on your insurance coverage.  Please reach out to your insurance provider with any questions.    If during the course of the call the physician/provider feels a video visit is not appropriate, you will not be charged for this service.\"    Patient has given verbal consent for Video visit? Yes    How would you like to obtain your AVS? MyChart    If the video visit is dropped, the invitation should be resent by: Text to cell phone: [phone]    Will anyone else be joining your video visit? No    I    Video-Visit Details    Type of service:  Video Visit    Originating Location (pt. Location): Home    Distant Location (provider location):   Off-Site - Provider Home Office    Platform used for Video Visit: Balandras    Video Start Time:  10:31am    Video End Time: 10:52am    NUTRITION POST OP APPOINTMENT  DATE OF VISIT: December 16, 2024    Jenae Miller  1965  female  [id number]  59 year old     ASSESSMENT:    REASON FOR VISIT:  Jenae is a 59 year old year old female presents today for 9 year PO nutrition follow-up appointment. Patient is accompanied by self.    DIAGNOSIS:  Status post gastric sleeve surgery.  Obesity Grade III BMI >40     ANTHROPOMETRICS:  Initial Weight: 276 lbs    Height: 67\"   Previous Weight: 270 lbs  Current Weight: 268 lbs 6.4 oz     BMI: Body " "mass index is 42.04 kg/m .    VITAMINS AND MINERALS:  2 Multivitamin with Minerals ([employer] Adult; lunch)  700 mg Calcium With Vitamin D (1-2x/day)  Vitamin D held d/t labs  1000 mcg Vitamin B-12 sublingual - 3x/week (MWF)  Vitamin B1 100mg 1x/week     No iron needed; post-menopausal    WEIGHT LOSS MEDICATION:  None; prescribed Naltrexone but not taking    NUTRITION HISTORY:  Breakfast: Cheerios + milk + fruit   Lunch: sandwich (\"skinny bread,\" mustard, turkey, cheese, pickle) + chips  Supper: salmon + quinoa/brown rice   Snacks: [morning] 1 slice of toast w/maple pumpkin spread  [afternoon] fruit  [evenings] nuts, pretzels, chocolate, cookies, ice cream   Fluids consumed: Water/enhanced water (44-56oz), coffee (cold foam), Ana water (12oz/day), soda (12oz/day), milk (12oz/day, skim), EtOH (1-2x/week, 1-3 drinks)   Consuming liquid calories: Yes  Protein intake: 50-70 grams/day  Tolerate regular texture food: Yes  Portion size: 1 cup or more  Take 20-30 minutes to consume each meal: Yes   Eat protein foods first: Yes  Fluids and meals separate by at least 30 minutes: usually  Chew foods thoroughly: Yes  Tolerating diet: Yes  Drinking high protein supplements: No  Consuming snacks per day: 2-3  Additional Information: Pt continues to work with therapist and shares she's getting to a better headspace with food. Trying to work through her supply of snacks and then plans to not bring triggering foods into the home.         PHYSICAL ACTIVITY:  Walking as able, weather-permitting  Yoga 1-2x/week  Joined a gym and plans to start water aerobics and group classes after recovers from surgery     DIAGNOSIS:  Previous Nutrition Diagnosis: Altered gastrointestinal function related to alteration in gastrointestinal structure as evidenced by history of gastric sleeve surgery.- no change    Previous goals:  Switch to Rubén Kovacs breakfast sandwich - not met  Take Calcium per guidelines - partially met  Take Naltrexone " consistently - not met  Add second glass of milk or protein shake - not met  Practice alternative activities to evening snacking - not met    Current Nutrition Diagnosis: Altered gastrointestinal function related to alteration in gastrointestinal structure as evidenced by history of gastric sleeve surgery.    INTERVENTION:   Nutrition Prescription: Eat 3 meals a day at regular intervals. Consume 60-90 grams of protein daily. Follow post-surgical vitamin and mineral protocol.  Assessed learning needs and learning preferences.    GOALS:  Once recovered from surgery, start strength training 2-3x/week  Increase protein at breakfast  Be mindful of snacking habits  Consistently take Calcium BID  Increase milk to 2 glasses or add a daily protein drink      Follow-Up:   Recommend follow up visit in 3-4 months to assist with lifestyle changes or per insurance.  Implementation: Discussed progress toward previous goals; reinforced importance of following bariatric lifestyle changes.    NUTRITION MONITORING AND EVALUATION:  Anticipated compliance: fair  Verbalized fair-good understanding.    Follow up: Patient to follow up in 3-4 months.    TIME SPENT WITH PATIENT:  21 minutes      Janie Dean RD, LD  Clinical Dietitian

## 2024-12-16 NOTE — PATIENT INSTRUCTIONS
"Emory Holm!      It was great chatting with you again today! Here's a summary of the goals we discussed:    1. Upon recovery from surgery, add in strength training  - Building muscle is one of the best ways women can support their metabolism  - Aim to work each major muscle group (upper body, lower body, core) 2-3x/week    2. Be mindful of snacking habits  - Practice non-food alternative activities to evening cravings: reading, crossword puzzles, sudoku, games, arts/crafts/hobbies, adult coloring books, etc  - Include protein with all snacks  - Separate fluids from snacks by 30 minutes  - See \"Easy Foods\" list below for snack ideas  - Check out Bariatric Pal for snack foods with protein: BariatricPal Food    3. Increase protein supplementation  - Have 2 glasses of milk each day, or 1 protein drink per day    4. Consistently take your Calcium 2x/day    5. Increase protein at breakfast  - Use protein-fortified cereal   - Breakfast sandwich: bagel thin + egg + low-fat cheese + lean deli meat, have fruit on the side      Plan on following up in 3-4 months. This can be scheduled via our call center at . Reach out sooner with any questions or concerns. Have a great day!      Janie Dean RD, LD  Clinical Dietitian     Easy Foods (by group)      Meals: pick 1 item from 3-4 food groups  Snacks: pick 1 item from 1-2 food groups     Protein:  yogurt  cottage cheese  low-fat cheese sticks/string cheese  lean deli meat (ie: chicken, turkey or low-fat ham)  lean beef or turkey jerky  pre-cooked grilled chicken breast  hard boiled eggs  nuts/seeds (in moderation - up to 1 serving [1/4c] per day)  protein shakes/bars (ie:  Premier Protein )  low-fat milk   edamame  chickpeas  rotisserie chicken   frozen veggie/black bean burgers  canned tuna or chicken         Vegetables:  baby carrots  celery sticks  sugar snap peas  cherry tomatoes  sliced bell peppers  sliced cucumber  chopped broccoli or cauliflower  raw string " beans     Fruit:  grapes  berries  apples  pears  bananas  peaches  nectarines  plums  kiwi  pineapple        Starches:  whole wheat do bread ( Mukund s )  high-fiber tortillas ( [place] Carb Balance )  Bean-based tortilla chips ( Beanitos )  Whole wheat crackers ( Triscuits )  Whole wheat bread  Dried/roasted beans/lentils ( Bada Bean Bada Boom  dried Frank beans - available online or in some stores)  roasted chickpeas  high-fiber cereal (Fiber One, Kashi, Shredded Wheat, Grape Nuts, etc)  air-popped popcorn (1-2 cups)

## 2024-12-30 ENCOUNTER — ANESTHESIA EVENT (OUTPATIENT)
Dept: SURGERY | Facility: CLINIC | Age: 59
End: 2024-12-30
Payer: COMMERCIAL

## 2024-12-31 ENCOUNTER — HOSPITAL ENCOUNTER (OUTPATIENT)
Facility: CLINIC | Age: 59
Discharge: HOME | End: 2024-12-31
Attending: PLASTIC SURGERY | Admitting: PLASTIC SURGERY
Payer: COMMERCIAL

## 2024-12-31 ENCOUNTER — ANESTHESIA (OUTPATIENT)
Dept: SURGERY | Facility: CLINIC | Age: 59
End: 2024-12-31
Payer: COMMERCIAL

## 2024-12-31 VITALS
TEMPERATURE: 96.9 F | DIASTOLIC BLOOD PRESSURE: 86 MMHG | RESPIRATION RATE: 15 BRPM | HEIGHT: 67 IN | BODY MASS INDEX: 42.96 KG/M2 | HEART RATE: 80 BPM | SYSTOLIC BLOOD PRESSURE: 153 MMHG | WEIGHT: 273.7 LBS | OXYGEN SATURATION: 95 %

## 2024-12-31 DIAGNOSIS — Z85.3 HX: BREAST CANCER: Primary | ICD-10-CM

## 2024-12-31 LAB
PATH REPORT.COMMENTS IMP SPEC: NORMAL
PATH REPORT.COMMENTS IMP SPEC: NORMAL
PATH REPORT.FINAL DX SPEC: NORMAL
PATH REPORT.GROSS SPEC: NORMAL
PATH REPORT.MICROSCOPIC SPEC OTHER STN: NORMAL
PATH REPORT.RELEVANT HX SPEC: NORMAL
PHOTO IMAGE: NORMAL

## 2024-12-31 PROCEDURE — 250N000025 HC SEVOFLURANE, PER MIN: Performed by: PLASTIC SURGERY

## 2024-12-31 PROCEDURE — L8600 IMPLANT BREAST SILICONE/EQ: HCPCS | Performed by: PLASTIC SURGERY

## 2024-12-31 PROCEDURE — 250N000013 HC RX MED GY IP 250 OP 250 PS 637: Performed by: ANESTHESIOLOGY

## 2024-12-31 PROCEDURE — 250N000011 HC RX IP 250 OP 636: Performed by: NURSE ANESTHETIST, CERTIFIED REGISTERED

## 2024-12-31 PROCEDURE — 710N000009 HC RECOVERY PHASE 1, LEVEL 1, PER MIN: Performed by: PLASTIC SURGERY

## 2024-12-31 PROCEDURE — 88300 SURGICAL PATH GROSS: CPT | Mod: TC | Performed by: PLASTIC SURGERY

## 2024-12-31 PROCEDURE — 88300 SURGICAL PATH GROSS: CPT | Mod: 26 | Performed by: STUDENT IN AN ORGANIZED HEALTH CARE EDUCATION/TRAINING PROGRAM

## 2024-12-31 PROCEDURE — 272N000001 HC OR GENERAL SUPPLY STERILE: Performed by: PLASTIC SURGERY

## 2024-12-31 PROCEDURE — 370N000017 HC ANESTHESIA TECHNICAL FEE, PER MIN: Performed by: PLASTIC SURGERY

## 2024-12-31 PROCEDURE — 710N000012 HC RECOVERY PHASE 2, PER MINUTE: Performed by: PLASTIC SURGERY

## 2024-12-31 PROCEDURE — 250N000009 HC RX 250: Performed by: PLASTIC SURGERY

## 2024-12-31 PROCEDURE — 250N000009 HC RX 250: Performed by: NURSE ANESTHETIST, CERTIFIED REGISTERED

## 2024-12-31 PROCEDURE — 250N000011 HC RX IP 250 OP 636: Performed by: ANESTHESIOLOGY

## 2024-12-31 PROCEDURE — 999N000141 HC STATISTIC PRE-PROCEDURE NURSING ASSESSMENT: Performed by: PLASTIC SURGERY

## 2024-12-31 PROCEDURE — 250N000011 HC RX IP 250 OP 636

## 2024-12-31 PROCEDURE — 258N000003 HC RX IP 258 OP 636: Performed by: ANESTHESIOLOGY

## 2024-12-31 PROCEDURE — 360N000076 HC SURGERY LEVEL 3, PER MIN: Performed by: PLASTIC SURGERY

## 2024-12-31 DEVICE — SMOOTH ROUND ULTRA HIGH PROFILE SILICONE GEL-FILLED BREAST IMPLANT, 800CC  SMOOTH ROUND SILICONE
Type: IMPLANTABLE DEVICE | Site: BREAST | Status: FUNCTIONAL
Brand: MENTOR MEMORYGEL BREAST IMPLANT

## 2024-12-31 RX ORDER — FENTANYL CITRATE 0.05 MG/ML
25 INJECTION, SOLUTION INTRAMUSCULAR; INTRAVENOUS EVERY 5 MIN PRN
Status: DISCONTINUED | OUTPATIENT
Start: 2024-12-31 | End: 2024-12-31 | Stop reason: HOSPADM

## 2024-12-31 RX ORDER — DEXAMETHASONE SODIUM PHOSPHATE 4 MG/ML
4 INJECTION, SOLUTION INTRA-ARTICULAR; INTRALESIONAL; INTRAMUSCULAR; INTRAVENOUS; SOFT TISSUE
Status: DISCONTINUED | OUTPATIENT
Start: 2024-12-31 | End: 2024-12-31 | Stop reason: HOSPADM

## 2024-12-31 RX ORDER — MAGNESIUM HYDROXIDE 1200 MG/15ML
LIQUID ORAL PRN
Status: DISCONTINUED | OUTPATIENT
Start: 2024-12-31 | End: 2024-12-31 | Stop reason: HOSPADM

## 2024-12-31 RX ORDER — SODIUM CHLORIDE, SODIUM LACTATE, POTASSIUM CHLORIDE, CALCIUM CHLORIDE 600; 310; 30; 20 MG/100ML; MG/100ML; MG/100ML; MG/100ML
INJECTION, SOLUTION INTRAVENOUS CONTINUOUS
Status: DISCONTINUED | OUTPATIENT
Start: 2024-12-31 | End: 2024-12-31 | Stop reason: HOSPADM

## 2024-12-31 RX ORDER — HYDROMORPHONE HCL IN WATER/PF 6 MG/30 ML
0.2 PATIENT CONTROLLED ANALGESIA SYRINGE INTRAVENOUS EVERY 5 MIN PRN
Status: DISCONTINUED | OUTPATIENT
Start: 2024-12-31 | End: 2024-12-31 | Stop reason: HOSPADM

## 2024-12-31 RX ORDER — NALOXONE HYDROCHLORIDE 0.4 MG/ML
0.1 INJECTION, SOLUTION INTRAMUSCULAR; INTRAVENOUS; SUBCUTANEOUS
Status: DISCONTINUED | OUTPATIENT
Start: 2024-12-31 | End: 2024-12-31 | Stop reason: HOSPADM

## 2024-12-31 RX ORDER — ONDANSETRON 2 MG/ML
INJECTION INTRAMUSCULAR; INTRAVENOUS PRN
Status: DISCONTINUED | OUTPATIENT
Start: 2024-12-31 | End: 2024-12-31

## 2024-12-31 RX ORDER — PROPOFOL 10 MG/ML
INJECTION, EMULSION INTRAVENOUS CONTINUOUS PRN
Status: DISCONTINUED | OUTPATIENT
Start: 2024-12-31 | End: 2024-12-31

## 2024-12-31 RX ORDER — ONDANSETRON 4 MG/1
4 TABLET, ORALLY DISINTEGRATING ORAL EVERY 30 MIN PRN
Status: DISCONTINUED | OUTPATIENT
Start: 2024-12-31 | End: 2024-12-31 | Stop reason: HOSPADM

## 2024-12-31 RX ORDER — PROPOFOL 10 MG/ML
INJECTION, EMULSION INTRAVENOUS PRN
Status: DISCONTINUED | OUTPATIENT
Start: 2024-12-31 | End: 2024-12-31

## 2024-12-31 RX ORDER — CLINDAMYCIN PHOSPHATE 900 MG/50ML
900 INJECTION, SOLUTION INTRAVENOUS
Status: COMPLETED | OUTPATIENT
Start: 2024-12-31 | End: 2024-12-31

## 2024-12-31 RX ORDER — FENTANYL CITRATE 50 UG/ML
INJECTION, SOLUTION INTRAMUSCULAR; INTRAVENOUS PRN
Status: DISCONTINUED | OUTPATIENT
Start: 2024-12-31 | End: 2024-12-31

## 2024-12-31 RX ORDER — DEXAMETHASONE SODIUM PHOSPHATE 4 MG/ML
INJECTION, SOLUTION INTRA-ARTICULAR; INTRALESIONAL; INTRAMUSCULAR; INTRAVENOUS; SOFT TISSUE PRN
Status: DISCONTINUED | OUTPATIENT
Start: 2024-12-31 | End: 2024-12-31

## 2024-12-31 RX ORDER — OXYCODONE HYDROCHLORIDE 5 MG/1
5 TABLET ORAL EVERY 6 HOURS PRN
Qty: 5 TABLET | Refills: 0 | Status: SHIPPED | OUTPATIENT
Start: 2024-12-31 | End: 2025-01-03

## 2024-12-31 RX ORDER — CLINDAMYCIN PHOSPHATE 900 MG/50ML
900 INJECTION, SOLUTION INTRAVENOUS SEE ADMIN INSTRUCTIONS
Status: DISCONTINUED | OUTPATIENT
Start: 2024-12-31 | End: 2024-12-31 | Stop reason: HOSPADM

## 2024-12-31 RX ORDER — EPHEDRINE SULFATE 50 MG/ML
INJECTION, SOLUTION INTRAMUSCULAR; INTRAVENOUS; SUBCUTANEOUS PRN
Status: DISCONTINUED | OUTPATIENT
Start: 2024-12-31 | End: 2024-12-31

## 2024-12-31 RX ORDER — OXYCODONE HYDROCHLORIDE 5 MG/1
5 TABLET ORAL ONCE
Status: COMPLETED | OUTPATIENT
Start: 2024-12-31 | End: 2024-12-31

## 2024-12-31 RX ORDER — LIDOCAINE HYDROCHLORIDE 20 MG/ML
INJECTION, SOLUTION INFILTRATION; PERINEURAL PRN
Status: DISCONTINUED | OUTPATIENT
Start: 2024-12-31 | End: 2024-12-31

## 2024-12-31 RX ORDER — HYDROMORPHONE HCL IN WATER/PF 6 MG/30 ML
0.4 PATIENT CONTROLLED ANALGESIA SYRINGE INTRAVENOUS EVERY 5 MIN PRN
Status: DISCONTINUED | OUTPATIENT
Start: 2024-12-31 | End: 2024-12-31 | Stop reason: HOSPADM

## 2024-12-31 RX ORDER — BUPIVACAINE HYDROCHLORIDE AND EPINEPHRINE 5; 5 MG/ML; UG/ML
INJECTION, SOLUTION PERINEURAL PRN
Status: DISCONTINUED | OUTPATIENT
Start: 2024-12-31 | End: 2024-12-31 | Stop reason: HOSPADM

## 2024-12-31 RX ORDER — FENTANYL CITRATE 0.05 MG/ML
50 INJECTION, SOLUTION INTRAMUSCULAR; INTRAVENOUS EVERY 5 MIN PRN
Status: DISCONTINUED | OUTPATIENT
Start: 2024-12-31 | End: 2024-12-31 | Stop reason: HOSPADM

## 2024-12-31 RX ORDER — ONDANSETRON 2 MG/ML
4 INJECTION INTRAMUSCULAR; INTRAVENOUS EVERY 30 MIN PRN
Status: DISCONTINUED | OUTPATIENT
Start: 2024-12-31 | End: 2024-12-31 | Stop reason: HOSPADM

## 2024-12-31 RX ADMIN — PROPOFOL 200 MG: 10 INJECTION, EMULSION INTRAVENOUS at 09:18

## 2024-12-31 RX ADMIN — MIDAZOLAM 2 MG: 1 INJECTION INTRAMUSCULAR; INTRAVENOUS at 09:10

## 2024-12-31 RX ADMIN — ONDANSETRON 4 MG: 2 INJECTION INTRAMUSCULAR; INTRAVENOUS at 09:22

## 2024-12-31 RX ADMIN — HYDROMORPHONE HYDROCHLORIDE 0.5 MG: 1 INJECTION, SOLUTION INTRAMUSCULAR; INTRAVENOUS; SUBCUTANEOUS at 09:36

## 2024-12-31 RX ADMIN — SODIUM CHLORIDE, POTASSIUM CHLORIDE, SODIUM LACTATE AND CALCIUM CHLORIDE: 600; 310; 30; 20 INJECTION, SOLUTION INTRAVENOUS at 07:38

## 2024-12-31 RX ADMIN — CLINDAMYCIN PHOSPHATE 900 MG: 900 INJECTION, SOLUTION INTRAVENOUS at 07:42

## 2024-12-31 RX ADMIN — DEXAMETHASONE SODIUM PHOSPHATE 4 MG: 4 INJECTION, SOLUTION INTRA-ARTICULAR; INTRALESIONAL; INTRAMUSCULAR; INTRAVENOUS; SOFT TISSUE at 09:22

## 2024-12-31 RX ADMIN — Medication 5 MG: at 09:47

## 2024-12-31 RX ADMIN — FENTANYL CITRATE 50 MCG: 50 INJECTION, SOLUTION INTRAMUSCULAR; INTRAVENOUS at 11:09

## 2024-12-31 RX ADMIN — LIDOCAINE HYDROCHLORIDE 100 MG: 20 INJECTION, SOLUTION INFILTRATION; PERINEURAL at 09:18

## 2024-12-31 RX ADMIN — OXYCODONE HYDROCHLORIDE 5 MG: 5 TABLET ORAL at 11:12

## 2024-12-31 RX ADMIN — FENTANYL CITRATE 50 MCG: 50 INJECTION, SOLUTION INTRAMUSCULAR; INTRAVENOUS at 10:37

## 2024-12-31 RX ADMIN — FENTANYL CITRATE 100 MCG: 50 INJECTION INTRAMUSCULAR; INTRAVENOUS at 09:18

## 2024-12-31 RX ADMIN — FENTANYL CITRATE 50 MCG: 50 INJECTION, SOLUTION INTRAMUSCULAR; INTRAVENOUS at 10:49

## 2024-12-31 RX ADMIN — PROPOFOL 30 MCG/KG/MIN: 10 INJECTION, EMULSION INTRAVENOUS at 09:27

## 2024-12-31 ASSESSMENT — ACTIVITIES OF DAILY LIVING (ADL)
ADLS_ACUITY_SCORE: 41

## 2024-12-31 ASSESSMENT — LIFESTYLE VARIABLES: TOBACCO_USE: 0

## 2024-12-31 ASSESSMENT — ENCOUNTER SYMPTOMS: DYSRHYTHMIAS: 0

## 2024-12-31 ASSESSMENT — COPD QUESTIONNAIRES: COPD: 0

## 2024-12-31 NOTE — ANESTHESIA POSTPROCEDURE EVALUATION
Patient: Jenae Miller    Procedure: Procedure(s):  revision of bilateral breast reconstruction  with removal and replacement of silicone gel implants       Anesthesia Type:  General    Note:  Disposition: Outpatient   Postop Pain Control: Uneventful            Sign Out: Well controlled pain   PONV: No   Neuro/Psych: Uneventful            Sign Out: Acceptable/Baseline neuro status   Airway/Respiratory: Uneventful            Sign Out: Acceptable/Baseline resp. status   CV/Hemodynamics: Uneventful            Sign Out: Acceptable CV status; No obvious hypovolemia; No obvious fluid overload   Other NRE: NONE   DID A NON-ROUTINE EVENT OCCUR? No       Last vitals:  Vitals Value Taken Time   /88 12/31/24 1130   Temp 36.1  C (96.9  F) 12/31/24 1021   Pulse 77 12/31/24 1133   Resp 24 12/31/24 1134   SpO2 97 % 12/31/24 1134   Vitals shown include unfiled device data.    Electronically Signed By: Junior Mcdonald MD  December 31, 2024  3:27 PM

## 2024-12-31 NOTE — DISCHARGE INSTRUCTIONS
Same Day Surgery Discharge Instructions for  Sedation and General Anesthesia     It's not unusual to feel dizzy, light-headed or faint for up to 24 hours after surgery or while taking pain medication.  If you have these symptoms: sit for a few minutes before standing and have someone assist you when you get up to walk or use the bathroom.    You should rest and relax for the next 24 hours. We recommend you make arrangements to have an adult stay with you for at least 24 hours after your discharge.  Avoid hazardous and strenuous activity.    DO NOT DRIVE any vehicle or operate mechanical equipment for 24 hours following the end of your surgery.  Even though you may feel normal, your reactions may be affected by the medication you have received.    Do not drink alcoholic beverages for 24 hours following surgery.     Slowly progress to your regular diet as you feel able. It's not unusual to feel nauseated and/or vomit after receiving anesthesia.  If you develop these symptoms, drink clear liquids (apple juice, ginger ale, broth, 7-up, etc. ) until you feel better.  If your nausea and vomiting persists for 24 hours, please notify your surgeon.      All narcotic pain medications, along with inactivity and anesthesia, can cause constipation. Drinking plenty of liquids and increasing fiber intake will help.    For any questions of a medical nature, call your surgeon.    Do not make important decisions for 24 hours.    If you had general anesthesia, you may have a sore throat for a couple of days related to the breathing tube used during surgery.  You may use Cepacol lozenges to help with this discomfort.  If it worsens or if you develop a fever, contact your surgeon.     If you feel your pain is not well managed with the pain medications prescribed by your surgeon, please contact your surgeon's office to let them know so they can address your concerns.      **If you have questions or concerns about your procedure,   call  Dr Sherman at [phone]**

## 2024-12-31 NOTE — OP NOTE
OPERATIVE NOTE    Date of procedure: 12/31/24    PREOPERATIVE DIAGNOSIS: History of breast cancer with acquired absence of bilateral breasts, ruptured left breast reconstruction silicone implant    POSTOPERATIVE DIAGNOSIS: Same    PROCEDURE: Revision of bilateral breast reconstruction with removal and replacement of silicone gel implants including capsule modification    SURGEON: Yassine Sherman MD    ASSISTANT: Rosie Noguera PA-C    A surgical first assistant was medically necessary for patient positioning, retraction and visualization of anatomic structures, hemostasis, and suture closure of incisions.    TECHNIQUE:     The patient has a remote history of breast mastectomy and staged reconstruction including expanders and implants.  She has silicone gel implants in place about 14 years old.  She was found to have a ruptured gel implant on the left side with significant capsule lateral dilation and other mild asymmetries.  She presents today for removal and replacement of both implants and modification of her reconstructions.    We discussed the expected discomfort activity restrictions wound care time off work etc.  She voiced understanding was agreeable to proceed.  She was placed supine on the procedure table under general LMA anesthesia administered by the anesthesia department.  The breasts were prepped and draped in sterile fashion and timeout was done.  I began on the right side.  I excised the lateral aspect of her mastectomy scar and remove the implant.  It is intact.  I performed a lateral capsulorrhaphy to bring this pocket more medially and improved shape and definition.  The explanted device was 800 cc.  I irrigated the pocket with dilute Betadine and injected 10 cc of 0.25% Marcaine 1-200,000 epinephrine.  I placed a [place] [facility] OR smooth round ultrahigh profile 800 cc silicone gel implant reference #[id number] BC #[id number].  The capsule was closed with 2-0 Vicryl and the skin with 3-0 and 4-0  Vicryl.  I turned my attention to the left side.  The same technique was used to open the lateral aspect of the mastectomy scar and remove the implant.  As expected this device was ruptured.  The cohesive gel was easy to remove completely.  No remaining gel material was left within the capsule.  This was irrigated.  A much more significant lateral and inferior capsulorrhaphy was performed on this side to improve pocket symmetry.  This was done in multiple layers with 2-0 Vicryl.  The same pocket preparation was performed including injecting 20 cc of the local anesthetic.  An identical style and size of implant was placed on the left side with serial #[id number].  The same closure was performed.  There is excellent symmetry and soft tissue viability at the termination of procedure.  Sterile dressings were applied.    Anesthesia was reversed and the patient was taken to the recovery room in satisfactory condition.    ESTIMATED BLOOD LOSS 10cc    Sponge and needle counts correct    Findings noted above    Specimens none    Yassine Sherman MD on 12/31/2024 at 10:23 AM

## 2024-12-31 NOTE — ANESTHESIA PREPROCEDURE EVALUATION
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 Dr. Chayo Chan.      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     GERD (gastroesophageal reflux disease)      Hyperlipidemia      Hypertension 2015     LGSIL (low grade squamous intraepithelial dysplasia) 2004     Malrotation colon (H)      Monoallelic mutation of CHEK2 gene in female patient 2022     RITU (obstructive sleep apnea)      Osteoarthritis of knee 2019     Postsurgical malabsorption 2023     Sleep apnea      TMJ (dislocation of temporomandibular joint)       Past Surgical History:   Procedure Laterality Date     ABDOMEN SURGERY  2015    Vertical sleeve gastrectomy     BREAST BIOPSY, RT/LT      lt , rt       COLONOSCOPY  2012    Repeat Colonoscopy in 5 yrs.     COSMETIC SURGERY   and      CRYOCAUTERY OF CERVIX      [place]     CYSTOSCOPY,DIL URETHRAL STRICTURE      age 7     HC DILATION/CURETTAGE DIAG/THER NON OB  2006     HC TOOTH EXTRACTION W/FORCEP       MASTECTOMY, BILATERAL  2010    rt sided breast ca  with reconstruction     TONSILLECTOMY & ADENOIDECTOMY  2005     VASCULAR SURGERY  2017    Closures, phlebectomies, sclerotherapy     ZZHC COLONOSCOPY THRU STOMA W BIOPSY/CAUTERY TUMOR/POLYP/LESION      Dr. Jackson 96, '01 Repeat      ZZHC COLONOSCOPY THRU STOMA, DIAGNOSTIC  2007           Allergies    Allergen Reactions     Sulfa Antibiotics Anaphylaxis     Eyes red and swollen     Topiramate Hives     Adhesive Tape Rash     surgical tape  Other reaction(s): Hives     Hydrocodone-Acetaminophen Hives     Other reaction(s): Flushing, Hives  Liquid  Lortab only, reports pill with no issues       Influenza Vaccine Live      Bell's Palsy     Penicillins Hives     Taxotere Hives      Social History     Tobacco Use     Smoking status: Never     Passive exposure: Never     Smokeless tobacco: Never   Substance Use Topics     Alcohol use: Yes     Comment: One or two drinks a week      Wt Readings from Last 1 Encounters:   12/16/24 121.7 kg (268 lb 6.4 oz)        Anesthesia Evaluation   Pt has had prior anesthetic. Type: General.    No history of anesthetic complications       ROS/MED HX  ENT/Pulmonary:     (+) sleep apnea, mild, doesn't use CPAP,                   Intermittent, asthma               (-) tobacco use, COPD and recent URI   Neurologic:  - neg neurologic ROS     Cardiovascular:     (+) Dyslipidemia hypertension- -   -  - -                                   (-) CAD and arrhythmias   METS/Exercise Tolerance:     Hematologic:  - neg hematologic  ROS     Musculoskeletal:       GI/Hepatic:    (-) GERD and liver disease   Renal/Genitourinary:    (-) renal disease   Endo:     (+)               Obesity,    (-) Type I DM and Type II DM   Psychiatric/Substance Use:       Infectious Disease:       Malignancy:   (+) Malignancy, History of Breast.Breast CA Remission status post Surgery.      Other:            Physical Exam    Airway        Mallampati: I   TM distance: > 3 FB   Neck ROM: full   Mouth opening: > 3 cm    Respiratory Devices and Support         Dental       (+) Minor Abnormalities - some fillings, tiny chips      Cardiovascular          Rhythm and rate: regular and normal     Pulmonary   pulmonary exam normal            OUTSIDE LABS:  CBC:   Lab Results   Component Value Date    WBC 7.1 06/26/2024    WBC 7.9  "04/04/2023    HGB 13.2 06/26/2024    HGB 14.0 04/04/2023    HCT 39.9 06/26/2024    HCT 42.7 04/04/2023     06/26/2024     04/04/2023     BMP:   Lab Results   Component Value Date     12/04/2024     07/08/2024    POTASSIUM 4.3 12/04/2024    POTASSIUM 4.2 07/08/2024    CHLORIDE 102 12/04/2024    CHLORIDE 103 07/08/2024    CO2 28 12/04/2024    CO2 28 07/08/2024    BUN 15.8 12/04/2024    BUN 10.8 07/08/2024    CR 1.00 (H) 12/04/2024    CR 0.94 07/08/2024    GLC 90 12/04/2024    GLC 95 07/08/2024     COAGS: No results found for: \"PTT\", \"INR\", \"FIBR\"  POC:   Lab Results   Component Value Date    HCG  10/21/2010     Negative   This test provides a presumptive diagnosis of pregnancy or non-pregnancy. A   confirmed pregnancy diagnosis should only be made by a physician after all   clinical and laboratory findings have been evaluated.     HEPATIC:   Lab Results   Component Value Date    ALBUMIN 4.3 12/01/2023    PROTTOTAL 6.7 12/01/2023    ALT 20 12/01/2023    AST 28 12/01/2023    ALKPHOS 101 12/01/2023    BILITOTAL 0.6 12/01/2023    BILIDIRECT 0.1 02/21/2014     OTHER:   Lab Results   Component Value Date    A1C 5.4 04/04/2023    ALPHONSO 10.0 12/04/2024    TSH 1.07 07/08/2024    T4 1.10 02/21/2014       Anesthesia Plan    ASA Status:  3    NPO Status:  NPO Appropriate    Anesthesia Type: General.     - Airway: LMA   Induction: Intravenous.   Maintenance: Balanced.        Consents    Anesthesia Plan(s) and associated risks, benefits, and realistic alternatives discussed. Questions answered and patient/representative(s) expressed understanding.     - Discussed:     - Discussed with:  Patient      - Extended Intubation/Ventilatory Support Discussed: No.      - Patient is DNR/DNI Status: No     Use of blood products discussed: No .     Postoperative Care    Pain management: Multi-modal analgesia.   PONV prophylaxis: Ondansetron (or other 5HT-3), Dexamethasone or Solumedrol, Background Propofol Infusion " "    Comments:               Junior Mcdonald MD    I have reviewed the pertinent notes and labs in the chart from the past 30 days and (re)examined the patient.  Any updates or changes from those notes are reflected in this note.                         # Severe Obesity: Estimated body mass index is 42.04 kg/m  as calculated from the following:    Height as of 12/16/24: 1.702 m (5' 7\").    Weight as of 12/16/24: 121.7 kg (268 lb 6.4 oz).       # Asthma: noted on problem list       "

## 2024-12-31 NOTE — ANESTHESIA CARE TRANSFER NOTE
Patient: Jenae Miller    Procedure: Procedure(s):  revision of bilateral breast reconstruction  with removal and replacement of silicone gel implants       Diagnosis: History of breast cancer [Z85.3]  H/O bilateral mastectomy [Z90.13]  Diagnosis Additional Information: No value filed.    Anesthesia Type:   General     Note:    Oropharynx: oropharynx clear of all foreign objects and spontaneously breathing  Level of Consciousness: awake  Oxygen Supplementation: face mask  Level of Supplemental Oxygen (L/min / FiO2): 8  Independent Airway: airway patency satisfactory and stable  Dentition: dentition unchanged  Vital Signs Stable: post-procedure vital signs reviewed and stable  Report to RN Given: handoff report given  Patient transferred to: PACU    Handoff Report: Identifed the Patient, Identified the Reponsible Provider, Reviewed the pertinent medical history, Discussed the surgical course, Reviewed Intra-OP anesthesia mangement and issues during anesthesia, Set expectations for post-procedure period and Allowed opportunity for questions and acknowledgement of understanding      Vitals:  Vitals Value Taken Time   /91    Temp     Pulse 73    Resp     SpO2 99 % 12/31/24 1021   Vitals shown include unfiled device data.    Electronically Signed By: PIETER Frias CRNA  December 31, 2024  10:22 AM

## 2024-12-31 NOTE — ANESTHESIA PROCEDURE NOTES
Airway       Patient location during procedure: OR  Staff -        Anesthesiologist:  Junior Mcdonald MD       CRNA: Mikayla Da Silva APRN CRNA       Performed By: CRNA  Consent for Airway        Urgency: elective  Indications and Patient Condition       Indications for airway management: jose miguel-procedural       Induction type:intravenous       Mask difficulty assessment: 0 - not attempted    Final Airway Details       Final airway type: supraglottic airway    Supraglottic Airway Details        Type: LMA       Brand: I-Gel       LMA size: 4    Post intubation assessment        Placement verified by: capnometry, equal breath sounds and chest rise        Number of attempts at approach: 1       Number of other approaches attempted: 0       Ease of procedure: easy       Dentition: Intact and Unchanged

## 2024-12-31 NOTE — OR NURSING
Meets criteria for discharge.  Discharge instructions reviewed with pt and pt's designated responsible party.  Pt label on prescription bag from pharmacy matched to pt's wristband. Pharmacy bag opened with 1 prescriptions inside. Medications were reviewed to match pt wristband while pt and significant other agreed with identification. Prescriptions placed back in pharmacy bag resealed with tape and in bag per pt request.

## 2025-01-06 ASSESSMENT — ASTHMA QUESTIONNAIRES
QUESTION_4 LAST FOUR WEEKS HOW OFTEN HAVE YOU USED YOUR RESCUE INHALER OR NEBULIZER MEDICATION (SUCH AS ALBUTEROL): NOT AT ALL
ACT_TOTALSCORE: 21
QUESTION_5 LAST FOUR WEEKS HOW WOULD YOU RATE YOUR ASTHMA CONTROL: COMPLETELY CONTROLLED
QUESTION_1 LAST FOUR WEEKS HOW MUCH OF THE TIME DID YOUR ASTHMA KEEP YOU FROM GETTING AS MUCH DONE AT WORK, SCHOOL OR AT HOME: NONE OF THE TIME
QUESTION_2 LAST FOUR WEEKS HOW OFTEN HAVE YOU HAD SHORTNESS OF BREATH: NOT AT ALL
ACT_TOTALSCORE: 21
QUESTION_3 LAST FOUR WEEKS HOW OFTEN DID YOUR ASTHMA SYMPTOMS (WHEEZING, COUGHING, SHORTNESS OF BREATH, CHEST TIGHTNESS OR PAIN) WAKE YOU UP AT NIGHT OR EARLIER THAN USUAL IN THE MORNING: FOUR OR MORE NIGHTS A WEEK

## 2025-01-07 ENCOUNTER — VIRTUAL VISIT (OUTPATIENT)
Dept: FAMILY MEDICINE | Facility: CLINIC | Age: 60
End: 2025-01-07
Payer: COMMERCIAL

## 2025-01-07 ENCOUNTER — TELEPHONE (OUTPATIENT)
Dept: FAMILY MEDICINE | Facility: CLINIC | Age: 60
End: 2025-01-07

## 2025-01-07 DIAGNOSIS — U07.1 INFECTION DUE TO 2019 NOVEL CORONAVIRUS: Primary | ICD-10-CM

## 2025-01-07 DIAGNOSIS — E66.813 CLASS 3 SEVERE OBESITY DUE TO EXCESS CALORIES WITHOUT SERIOUS COMORBIDITY WITH BODY MASS INDEX (BMI) OF 40.0 TO 44.9 IN ADULT (H): ICD-10-CM

## 2025-01-07 DIAGNOSIS — I10 PRIMARY HYPERTENSION: ICD-10-CM

## 2025-01-07 DIAGNOSIS — J01.91 ACUTE RECURRENT SINUSITIS, UNSPECIFIED LOCATION: ICD-10-CM

## 2025-01-07 DIAGNOSIS — E66.01 CLASS 3 SEVERE OBESITY DUE TO EXCESS CALORIES WITHOUT SERIOUS COMORBIDITY WITH BODY MASS INDEX (BMI) OF 40.0 TO 44.9 IN ADULT (H): ICD-10-CM

## 2025-01-07 PROBLEM — J31.0 CHRONIC RHINITIS: Status: ACTIVE | Noted: 2025-01-07

## 2025-01-07 PROCEDURE — 98005 SYNCH AUDIO-VIDEO EST LOW 20: CPT | Performed by: FAMILY MEDICINE

## 2025-01-07 RX ORDER — AZELAIC ACID 0.15 G/G
GEL TOPICAL DAILY
COMMUNITY
Start: 2024-12-27

## 2025-01-07 RX ORDER — DOXYCYCLINE 100 MG/1
100 TABLET ORAL 2 TIMES DAILY
Qty: 14 TABLET | Refills: 0 | Status: SHIPPED | OUTPATIENT
Start: 2025-01-07 | End: 2025-01-14

## 2025-01-07 RX ORDER — DAPSONE 75 MG/G
GEL TOPICAL DAILY
COMMUNITY
Start: 2024-12-28

## 2025-01-07 NOTE — PROGRESS NOTES
"Alta is a 59 year old who is being evaluated via a billable video visit.    How would you like to obtain your AVS? MyChart  If the video visit is dropped, the invitation should be resent by: Text to cell phone: [phone]  Will anyone else be joining your video visit? No      Assessment & Plan     Infection due to 2019 novel coronavirus  Outside treatment window     Acute recurrent sinusitis, unspecified location  Offered observation. Patient prefers antibiotic. Discussed risks and benefits of this medication. Call or return to clinic as needed if these symptoms worsen or fail to improve as anticipated.   - doxycycline monohydrate (ADOXA) 100 MG tablet; Take 1 tablet (100 mg) by mouth 2 times daily for 7 days.    Primary hypertension  Class 3 severe obesity due to excess calories without serious comorbidity with body mass index (BMI) of 40.0 to 44.9 in adult (H)  Recommended home blood pressure monitoring and follow-up as scheduled           BMI  Estimated body mass index is 42.87 kg/m  as calculated from the following:    Height as of 12/31/24: 1.702 m (5' 7\").    Weight as of 12/31/24: 124.1 kg (273 lb 11.2 oz).   Weight management plan: Patient was referred to their PCP to discuss a diet and exercise plan.      Return in about 1 month (around 2/7/2025) for home blood pressure with a [employer] message.     Subjective   Alta is a 59 year old, presenting for the following health issues:  Suspected Covid (Covid treatment. Positive test test on Sunday and today.)        1/7/2025     5:37 PM   Additional Questions   Roomed by Etelvina FLORES CMA   Accompanied by Self         1/7/2025     5:37 PM   Patient Reported Additional Medications   Patient reports taking the following new medications None     History of Present Illness       Reason for visit:  Possible Covid  Symptom onset:  3-7 days ago  Symptoms include:  Cough, bronchitis, runny nose, sinus infection, headache, loss of taste and smell  Symptom intensity:  " Severe  Symptom progression:  Worsening  Had these symptoms before:  No  What makes it better:  Tylenol helps with the headache   She is taking medications regularly.     The patient complains of 7 days of typical URI symptoms; coryza, nasal stuffiness, congestion, postnasal drip, sore throat, malaise, and dry cough, non-productive. She is worried about history of sinusitis and prolonged respiratory illnesses and requests an antibiotic.                 Objective           Vitals:  No vitals were obtained today due to virtual visit.    Physical Exam   GENERAL: alert and no distress  EYES: Eyes grossly normal to inspection.  No discharge or erythema, or obvious scleral/conjunctival abnormalities.  RESP: No audible wheeze, cough, or visible cyanosis.    SKIN: Visible skin clear. No significant rash, abnormal pigmentation or lesions.  NEURO: Cranial nerves grossly intact.  Mentation and speech appropriate for age.  PSYCH: Appropriate affect, tone, and pace of words    Admission on 12/31/2024, Discharged on 12/31/2024   Component Date Value Ref Range Status    Case Report 12/31/2024    Final                    Value:Surgical Pathology Report                         Case: [id number]                                  Authorizing Provider:  Yassine Sherman,  Collected:           12/31/2024 09:37 AM                                 MD                                                                           Ordering Location:     [facility]          Received:            12/31/2024 10:32 AM                                 [facility] OR                                                            Pathologist:           Smitha Cesar MD                                                          Specimens:   A) - Breast, Right, right breast implant for gross only                                             B) - Breast, Left, left breast implant for gross only                                      Final Diagnosis  "12/31/2024    Final                    Value:A.  Breast, right, implant, removal:  -Gross diagnosis only; see gross description.    B.  Breast, left, implant, removal:  -Gross diagnosis only; see gross description.      Clinical Information 12/31/2024    Final                    Value:Procedure:  revision of bilateral breast reconstruction - Bilateral  with removal and replacement of silicone gel implants - Bilateral  Pre-op Diagnosis: History of breast cancer [Z85.3]  H/O bilateral mastectomy [Z90.13]  Post-op Diagnosis: Z85.3 - History of breast cancer [ICD-10-CM]  Z90.13 - H/O bilateral mastectomy [ICD-10-CM]      Gross Description 12/31/2024    Final                    Value:A(1). Breast, Right, right breast implant for gross only:  Received fresh, labeled with the patient's name, medical record number, and \"right breast implant for gross only\" is a 808 g, 15.0 x 14.7 x 6.3 cm smooth,  intact silicone breast implant.  No adherent soft tissue is present.  Inscribed upon 1 surface is \"STYLE 45, LOT [id number] ALLERGAN 800CC\".  The specimen is submitted for gross examination only.  No cassettes are submitted.  B(2). Breast, Left, left breast implant for gross only:  Received fresh, labeled with the patient's name, medical record number, and \"left breast implant for gross only\" is a 738 g previously disrupted silicone breast implant with a smooth capsule.  No adherent soft tissue is present.  Inscribed upon 1 surface is STYLE 45 ALLERGAN LOT [id number] 800CC\".  The specimen is submitted for gross examination only.  No cassettes are submitted.   MAGDA Hernandes(ASCP)CM 12/31/2024 10:46 AM       Microscopic Description 12/31/2024    Final                    Value:Gross description only      Performing Labs 12/31/2024    Final                    Value:The technical component of this testing was completed at [facility].    Stain controls for all stains " resulted within this report have been reviewed and show appropriate reactivity.        No results found for this or any previous visit (from the past 24 hours).      Video-Visit Details    Type of service:  Video Visit   Originating Location (pt. Location): Home    Distant Location (provider location):  On-site  Platform used for Video Visit: Pierre  Signed Electronically by: Lola Bray MD

## 2025-01-08 NOTE — TELEPHONE ENCOUNTER
General Call      Reason for Call:  call back    What are your questions or concerns:  Patient was seen on 01/07/25 and insurance will not cover the medication and needs a generic brand that will be covered    Date of last appointment with provider: n/a    Could we send this information to you in [facility] or would you prefer to receive a phone call?:   No preference   Okay to leave a detailed message?: Yes at Cell number on file:    Telephone Information:   Mobile [phone]

## 2025-01-08 NOTE — TELEPHONE ENCOUNTER
Patient called back to let us know that this has been resolved.  Pharmacy just needed updated insurance info due to it being a new calendar year.  They were able to fill the doxycycline for her without any further issues    Markus Elaine RN  [facility]

## 2025-01-08 NOTE — TELEPHONE ENCOUNTER
Called patient. Left voice message to return call at [phone].    When patient returns call, please clarify which medication she is requesting to be re-sent in as generic form.     ISADORA Kumari RN  [facility]

## 2025-01-13 ENCOUNTER — TRANSFERRED RECORDS (OUTPATIENT)
Dept: HEALTH INFORMATION MANAGEMENT | Facility: CLINIC | Age: 60
End: 2025-01-13
Payer: COMMERCIAL

## 2025-01-22 ENCOUNTER — ALLIED HEALTH/NURSE VISIT (OUTPATIENT)
Dept: FAMILY MEDICINE | Facility: CLINIC | Age: 60
End: 2025-01-22
Payer: COMMERCIAL

## 2025-01-22 DIAGNOSIS — Z23 ENCOUNTER FOR IMMUNIZATION: Primary | ICD-10-CM

## 2025-01-22 PROCEDURE — 90746 HEPB VACCINE 3 DOSE ADULT IM: CPT

## 2025-01-22 PROCEDURE — 99207 PR NO CHARGE NURSE ONLY: CPT

## 2025-01-22 PROCEDURE — 90471 IMMUNIZATION ADMIN: CPT

## 2025-01-22 NOTE — PROGRESS NOTES
Prior to immunization administration, verified patients identity using patient s name and date of birth. Please see Immunization Activity for additional information.     Is the patient's temperature normal (100.5 or less)?  98 Temporal Yes     Patient MEETS CRITERIA. PROCEED with vaccine administration.        1/22/2025   General Questionnaire    Do you have any questions for your care team about the vaccines you will be receiving today? None             1/22/2025   Hepatitis B   Have you had a serious reaction to a hepatitis B vaccine or to something in a hepatitis B vaccine, including yeast)? No   Are you getting kidney dialysis (either peritoneal or hemodialysis)? No   Do you have an allergy to latex? No         Patient MEETS CRITERIA. PROCEED with vaccine administration.        Patient instructed to remain in clinic for 15 minutes afterwards, and to report any adverse reactions.      Link to Ancillary Visit Immunization Standing Orders SmartSet     Screening performed by Harriet Edwards CMA on 1/22/2025 at 1:15 PM.

## 2025-01-27 NOTE — MR AVS SNAPSHOT
After Visit Summary   2/8/2017    Jenae Miller    MRN: [id number]           Patient Information     Date Of Birth          1965        Visit Information        Provider Department      2/8/2017 8:50 PM Lisandro Lam MD [facility]        Today's Diagnoses     Acute maxillary sinusitis, recurrence not specified    -  1       Care Instructions      Sinusitis (Antibiotic Treatment)    The sinuses are air-filled spaces within the bones of the face. They connect to the inside of the nose. Sinusitis is an inflammation of the tissue lining the sinus cavity. Sinus inflammation can occur during a cold. It can also be due to allergies to pollens and other particles in the air. Sinusitis can cause symptoms of sinus congestion and fullness. A sinus infection causes fever, headache and facial pain. There is often green or yellow drainage from the nose or into the back of the throat (post-nasal drip). You have been given antibiotics to treat this condition.  Home care:    Take the full course of antibiotics as instructed. Do not stop taking them, even if you feel better.    Drink plenty of water, hot tea, and other liquids. This may help thin mucus. It also may promote sinus drainage.    Heat may help soothe painful areas of the face. Use a towel soaked in hot water. Or,  the shower and direct the hot spray onto your face. Using a vaporizer along with a menthol rub at night may also help.     An expectorant containing guaifenesin may help thin the mucus and promote drainage from the sinuses.    Over-the-counter decongestants may be used unless a similar medicine was prescribed. Nasal sprays work the fastest. Use one that contains phenylephrine or oxymetazoline. First blow the nose gently. Then use the spray. Do not use these medicines more often than directed on the label or symptoms may get worse. You may also use tablets containing pseudoephedrine. Avoid products that combine  signed   ingredients, because side effects may be increased. Read labels. You can also ask the pharmacist for help. (NOTE: Persons with high blood pressure should not use decongestants. They can raise blood pressure.)    Over-the-counter antihistamines may help if allergies contributed to your sinusitis.      Do not use nasal rinses or irrigation during an acute sinus infection, unless told to by your health care provider. Rinsing may spread the infection to other sinuses.    Use acetaminophen or ibuprofen to control pain, unless another pain medicine was prescribed. (If you have chronic liver or kidney disease or ever had a stomach ulcer, talk with your doctor before using these medicines. Aspirin should never be used in anyone under 18 years of age who is ill with a fever. It may cause severe liver damage.)    Don't smoke. This can worsen symptoms.  Follow-up care  Follow up with your healthcare provider or our staff if you are not improving within the next week.  When to seek medical advice  Call your healthcare provider if any of these occur:    Facial pain or headache becoming more severe    Stiff neck    Unusual drowsiness or confusion    Swelling of the forehead or eyelids    Vision problems, including blurred or double vision    Fever of 100.4 F (38 C) or higher, or as directed by your healthcare provider    Seizure    Breathing problems    Symptoms not resolving within 10 days    [id number] [employer]. [address] [place] [address], [place] [address]. All rights reserved. This information is not intended as a substitute for professional medical care. Always follow your healthcare professional's instructions.        Patient Education    Levofloxacin Ophthalmic drops, solution    Levofloxacin Oral solution    Levofloxacin Oral tablet    Levofloxacin Solution for injection    Levofloxacin, Dextrose Solution for injection  Levofloxacin Oral tablet  What is this medicine?  LEVOFLOXACIN (oscar voe FLOX a sin) is a  quinolone antibiotic. It is used to treat certain kinds of bacterial infections. It will not work for colds, flu, or other viral infections.  This medicine may be used for other purposes; ask your health care provider or pharmacist if you have questions.  What should I tell my health care provider before I take this medicine?  They need to know if you have any of these conditions:    cerebral disease    irregular heartbeat    kidney disease    seizure disorder    an unusual or allergic reaction to levofloxacin, other antibiotics or medicines, foods, dyes, or preservatives    pregnant or trying to get pregnant    breast-feeding  How should I use this medicine?  Take this medicine by mouth with a full glass of water. Follow the directions on the prescription label. This medicine can be taken with or without food. Take your medicine at regular intervals. Do not take your medicine more often than directed. Do not skip doses or stop your medicine early even if you feel better. Do not stop taking except on your doctor's advice.  A special MedGuide will be given to you by the pharmacist with each prescription and refill. Be sure to read this information carefully each time.  Talk to your pediatrician regarding the use of this medicine in children. While this drug may be prescribed for children as young as 6 months for selected conditions, precautions do apply.  Overdosage: If you think you have taken too much of this medicine contact a poison control center or emergency room at once.  NOTE: This medicine is only for you. Do not share this medicine with others.  What if I miss a dose?  If you miss a dose, take it as soon as you remember. If it is almost time for your next dose, take only that dose. Do not take double or extra doses.  What may interact with this medicine?  Do not take this medicine with any of the following medications:    arsenic trioxide    chloroquine    droperidol    medicines for irregular heart rhythm  like amiodarone, disopyramide, dofetilide, flecainide, quinidine, procainamide, sotalol    some medicines for depression or mental problems like phenothiazines, pimozide, and ziprasidone  This medicine may also interact with the following medications:    amoxapine    antacids    cisapride    dairy products    didanosine (ddI) buffered tablets or powder    haloperidol    multivitamins    NSAIDS, medicines for pain and inflammation, like ibuprofen or naproxen    retinoid products like tretinoin or isotretinoin    risperidone    some other antibiotics like clarithromycin or erythromycin    sucralfate    theophylline    warfarin  This list may not describe all possible interactions. Give your health care provider a list of all the medicines, herbs, non-prescription drugs, or dietary supplements you use. Also tell them if you smoke, drink alcohol, or use illegal drugs. Some items may interact with your medicine.  What should I watch for while using this medicine?  Tell your doctor or health care professional if your symptoms do not improve or if they get worse. Drink several glasses of water a day and cut down on drinks that contain caffeine. You must not get dehydrated while taking this medicine.  You may get drowsy or dizzy. Do not drive, use machinery, or do anything that needs mental alertness until you know how this medicine affects you. Do not sit or stand up quickly, especially if you are an older patient. This reduces the risk of dizzy or fainting spells.  This medicine can make you more sensitive to the sun. Keep out of the sun. If you cannot avoid being in the sun, wear protective clothing and use a sunscreen. Do not use sun lamps or tanning beds/booths. Contact your doctor if you get a sunburn.  If you are a diabetic monitor your blood glucose carefully. If you get an unusual reading stop taking this medicine and call your doctor right away.  Do not treat diarrhea with over-the-counter products. Contact your  doctor if you have diarrhea that lasts more than 2 days or if the diarrhea is severe and watery.  Avoid antacids, calcium, iron, and zinc products for 2 hours before and 2 hours after taking a dose of this medicine.  What side effects may I notice from receiving this medicine?  Side effects that you should report to your doctor or health care professional as soon as possible:    allergic reactions like skin rash or hives, swelling of the face, lips, or tongue    changes in vision    confusion, nightmares or hallucinations    difficulty breathing    irregular heartbeat, chest pain    joint, muscle or tendon pain    pain or difficulty passing urine    persistent headache with or without blurred vision    redness, blistering, peeling or loosening of the skin, including inside the mouth    seizures    unusual pain, numbness, tingling, or weakness    vaginal irritation, discharge  Side effects that usually do not require medical attention (report to your doctor or health care professional if they continue or are bothersome):    diarrhea    dry mouth    headache    stomach upset, nausea    trouble sleeping  This list may not describe all possible side effects. Call your doctor for medical advice about side effects. You may report side effects to FDA at 2-512-FDA-5642.  Where should I keep my medicine?  Keep out of the reach of children.  Store at room temperature between 15 and 30 degrees C (59 and 86 degrees F). Keep in a tightly closed container. Throw away any unused medicine after the expiration date.  NOTE:This sheet is a summary. It may not cover all possible information. If you have questions about this medicine, talk to your doctor, pharmacist, or health care provider. Copyright  2016 [employer]              Follow-ups after your visit        Who to contact     If you have questions or need follow up information about today's clinic visit or your schedule please contact [facility] directly at  [phone].  Normal or non-critical lab and imaging results will be communicated to you by [employer], letter or phone within 4 business days after the clinic has received the results. If you do not hear from us within 7 days, please contact the clinic through [facility] or phone. If you have a critical or abnormal lab result, we will notify you by phone as soon as possible.  Submit refill requests through [email or web address] or call your pharmacy and they will forward the refill request to us. Please allow 3 business days for your refill to be completed.          Additional Information About Your Visit        [employer] Information     [email or web address] gives you secure access to your electronic health record. If you see a primary care provider, you can also send messages to your care team and make appointments. If you have questions, please call your primary care clinic.  If you do not have a primary care provider, please call [phone] and they will assist you.        Care EveryWhere ID     This is your Care EveryWhere ID. This could be used by other organizations to access your [place] medical records  [id number]        Your Vitals Were     Pulse Temperature Respirations Pulse Oximetry Breastfeeding?       78 97  F (36.1  C) (Oral) 15 100% No        Blood Pressure from Last 3 Encounters:   02/08/17 136/88   05/24/16 125/80   03/01/16 126/83    Weight from Last 3 Encounters:   02/08/17 210 lb (95.255 kg)   05/24/16 198 lb (89.812 kg)   03/01/16 200 lb (90.719 kg)              Today, you had the following     No orders found for display         Today's Medication Changes          These changes are accurate as of: 2/8/17  9:09 PM.  If you have any questions, ask your nurse or doctor.               Start taking these medicines.        Dose/Directions    levofloxacin 750 MG tablet   Commonly known as:  LEVAQUIN   Used for:  Acute maxillary sinusitis, recurrence not specified   Started by:  Lisandro Lam MD        Dose:  750 mg   Take 1  tablet (750 mg) by mouth daily   Quantity:  10 tablet   Refills:  0            Where to get your medicines      These medications were [id number] [place] [facility] [address],  [address] [place] [id number] [facility] , [facility] [address]     Phone:  [phone]    - levofloxacin 750 MG tablet             Primary Care Provider Office Phone # Fax #    Tonya Mtz [phone]       [facility] [address] [facility] [address]        Thank you!     Thank you for choosing [facility]  for your care. Our goal is always to provide you with excellent care. Hearing back from our patients is one way we can continue to improve our services. Please take a few minutes to complete the written survey that you may receive in the mail after your visit with us. Thank you!             Your Updated Medication List - Protect others around you: Learn how to safely use, store and throw away your medicines at www.disposemymeds.org.          This list is accurate as of: 2/8/17  9:09 PM.  Always use your most recent med list.                   Brand Name Dispense Instructions for use    albuterol 108 (90 BASE) MCG/ACT Inhaler    PROAIR HFA/PROVENTIL HFA/VENTOLIN HFA    3 Inhaler    Inhale 2 puffs into the lungs every 6 hours as needed for shortness of breath / dyspnea or wheezing       CALCIUM CITRATE PO          CENTRUM Tabs      1 TABLET DAILY       cholecalciferol 1000 UNIT tablet    vitamin D     Take 1 tablet by mouth 2 times daily.       levofloxacin 750 MG tablet    LEVAQUIN    10 tablet    Take 1 tablet (750 mg) by mouth daily       omega-3 fatty acids 1200 MG capsule      Take 1 capsule by mouth daily.       RESTASIS OP      Apply  to eye 2 times daily.       tamoxifen 20 MG tablet    NOLVADEX     Take 20 mg by mouth daily.       vitamin B complex with vitamin C Tabs tablet      Take 1 tablet by mouth daily

## 2025-02-20 NOTE — PROGRESS NOTES
"Alta is a 59 year old who is being evaluated via a billable video visit.      The patient has been notified of following:     \"This video visit will be conducted via a call between you and your physician/provider. We have found that certain health care needs can be provided without the need for an in-person physical exam.  This service lets us provide the care you need with a video conversation.  If a prescription is necessary we can send it directly to your pharmacy.  If lab work is needed we can place an order for that and you can then stop by our lab to have the test done at a later time.    Video visits are billed at different rates depending on your insurance coverage.  Please reach out to your insurance provider with any questions.    If during the course of the call the physician/provider feels a video visit is not appropriate, you will not be charged for this service.\"    Patient has given verbal consent for Video visit? Yes    How would you like to obtain your AVS? MyChart    If the video visit is dropped, the invitation should be resent by: Text to cell phone: [phone]    Will anyone else be joining your video visit? No    I    Video-Visit Details    Type of service:  Video Visit    Originating Location (pt. Location): Home    Distant Location (provider location):   Off-Site - Provider Home Office    Platform used for Video Visit: Esphion    Video Start Time: 11:08 AM    Video End Time:11:25 AM    3/11/2025  Return Bariatric Surgery Note    RE: Jenae Miller  MR#: [id number]  : 1965  VISIT DATE: 3/11/2025    Dear Lola Bray,    I had the pleasure of seeing your patient, Jenae Miller, in my post-bariatric surgery assessment clinic.    Assessment & Plan   Problem List Items Addressed This Visit       Bariatric surgery status     2015 Gastric Sleeve  July Janet López             Class 3 obesity - Primary     Patient has lost weight through nutrition " modification.  Using naltrexone intermittent.  Return to daily use of naltrexone.  Start metformin.  Creatinine 1.0 GFR WNL.  Risks/ benefits and possible side effects were discussed and questions were answered. Written information was given.            Relevant Medications    metFORMIN (GLUCOPHAGE XR) 500 MG 24 hr tablet    naltrexone (DEPADE/REVIA) 50 MG tablet    Other Relevant Orders    Comprehensive metabolic panel    Postsurgical malabsorption     Continue taking recommended post-op vitamins.  Labs ordered per protocol to be drawn before in July appointment           Relevant Orders    CBC with platelets    Vitamin B12    Vitamin D Screen    Parathyroid Hormone Intact    Iron and Iron Binding Capacity    Ferritin        PATIENT INSTRUCTIONS:  Start Metformin  Directions: Take 1 tablet by mouth daily with a meal for 1 week, then increase to 2 tablets daily with a meal, if tolerating can increase to 3 tablets daily with a meal or at bedtime.     Continue current vitamins.     Labs ordered.  Please call [phone] to set up a lab appt in 3-4 months.     Restart naltrexone daily in afternoon.     Follow up:    Call [phone] to schedule next visit in July for annual bariatric visit with PA and dietician.     24 minutes spent on the date of the encounter doing chart review, history and exam, result review, counseling, developing plan of care, documentation, and further activities as noted        CHIEF COMPLAINT: Post-bariatric surgery follow-up    HISTORY OF PRESENT ILLNESS:      2/24/2025     4:40 PM   Questions Regarding Prior Weight Loss Surgery Reviewed With Patient   I had the following weight loss procedure Sleeve Gastrectomy   What year was your surgery? 2015   How has your weight changed since your last visit? I have lost weight   Do you currently have any of the following Hypertension (high blood pressure)?   Do you have any concerns today? No     AOM Considerations: 3/11/2025   Phentermine: Stop  taking in 2021 due to elevated blood pressure  Topiramate: SE: Hives  GLP-1: not covered.  Naltrexone: Currently on, tolerated, helps with cravings.    Wellbutrin: Stopped due to SE.  Metformin: Was prescribed but never tried in past, was unsure if this was safe.  Contrave: No AOM coverage  Qsymia: No AOM coverage     INTERVAL HISTORY:  Alta returns for medical weight management follow up.  Last seen on 9/23/2024.  Prescribed Naltrexone but not taking. Working hard on no junk food, stopped drinking soda.  No sweets.  Was off naltrexone following surgery in December.  Is not taking consistently since.      Weight History:      2/24/2025     4:40 PM   --   What is your highest lifetime weight? 292   What is your lowest weight since surgery? (In pounds) 190     Initial Weight (lbs): 279 lbs  Weight: 261 lb (118.4 kg)     Cumulative weight loss (lbs): 18  Weight Loss Percentage: 6.45%    Wt Readings from Last 10 Encounters:   03/11/25 261 lb (118.4 kg)   12/31/24 273 lb 11.2 oz (124.1 kg)   12/16/24 268 lb 6.4 oz (121.7 kg)   12/09/24 268 lb 6.4 oz (121.7 kg)   12/04/24 271 lb (122.9 kg)   09/25/24 270 lb (122.5 kg)   09/23/24 270 lb (122.5 kg)   07/29/24 267 lb (121.1 kg)   07/08/24 267 lb (121.1 kg)   05/24/24 263 lb (119.3 kg)        Patient is taking the following bariatric postoperative vitamins:  2 Complete multivitamins with minerals (at different times than calcium)  5000 Int Units of Vitamin D daily   500 mg of calcium  mg of calcium BID  500 mcg of Vitamin B12 sl daily  1000 mcg Vitamin B12 injection monthly  B complex daily   Thiamine weekly  1 Iron/Vit C. daily        2/24/2025     4:40 PM   Questions Regarding Co-Morbidities and Health Concerns Reviewed With Patient   Pre-diabetes Never   Diabetes II Never   High Blood Pressure Stayed the same   High cholesterol Never   Heartburn/Reflux Never   Sleep apnea Improved   PCOS Never   Back pain Stayed the same   Joint pain Stayed the same   Lower leg  swelling Stayed the same           2/24/2025     4:40 PM   Eating Habits   How many meals do you eat per day? 3   Do you snack between meals? Sometimes   How much food are you eating at each meal? 1/2 cup to 1 cup   Are you able to separate your meals and liquids by at least 30 minutes? Yes   Are you able to avoid liquid calories? Yes           2/24/2025     4:40 PM   Exercise Questions Reviewed With Patient   How often do you exercise? Daily   What is the duration of your exercise (in minutes)? 60+ Minutes   What types of exercise do you do? walking    gym membership    other   What keeps you from being more active? Pain       Social History:      2/24/2025     4:40 PM   --   Are you smoking? No   Are you drinking alcohol? Yes   How much alcohol? 3/week       Medications:  Current Outpatient Medications   Medication Sig Dispense Refill    azelaic acid (FINACIA) 15 % external gel Apply topically daily.      CALCIUM CITRATE PO Take 500 mg by mouth 2 times daily      CENTRUM OR TABS Take 1 tablet by mouth 2 times daily      cod liver oil CAPS capsule Take 1 capsule by mouth daily      Cyanocobalamin (VITAMIN B 12 PO) Take 2,500 mcg by mouth twice a week      CycloSPORINE (RESTASIS OP) Apply  to eye 2 times daily.      dapsone (ACZONE) 7.5 % gel Apply topically daily.      fluticasone (FLONASE) 50 MCG/ACT nasal spray Spray 2 sprays into both nostrils daily. 15.8 mL 11    loratadine (CLARITIN) 10 MG tablet Take 10 mg by mouth daily.      losartan-hydrochlorothiazide (HYZAAR) 50-12.5 MG tablet Take 1 tablet by mouth daily 90 tablet 3    Lutein 10 MG TABS Take 1 tablet by mouth daily      metFORMIN (GLUCOPHAGE XR) 500 MG 24 hr tablet Take 1 tablet (500 mg) by mouth daily (with dinner) for 7 days, THEN 2 tablets (1,000 mg) daily (with dinner) for 7 days, THEN 3 tablets (1,500 mg) daily (with dinner). 270 tablet 1    naltrexone (DEPADE/REVIA) 50 MG tablet Take 1 tablet (50 mg) by mouth daily (with dinner). 90 tablet 1     thiamine (VITAMIN B1) 100 MG tablet Take 100 mg by mouth once a week      TURMERIC CURCUMIN PO Take 1 capsule by mouth daily as needed (pain, inflammation)       No current facility-administered medications for this visit.         2/24/2025     4:40 PM   --   Do you avoid NSAIDs such as (Ibuprofen, Aleve, Naproxen, Advil)? Yes       ROS:  GI:       2/24/2025     4:40 PM   --   Vomiting No   Diarrhea No   Constipation No   Swallowing trouble No   Abdominal pain No   Heartburn No     Skin:       2/24/2025     4:40 PM   BAR RBS ROS - SKIN   Rash in skin folds No     Psych:       2/24/2025     4:40 PM   --   Depression No   Anxiety Yes     Female Only:       2/24/2025     4:40 PM   BAR RBS ROS -    Female only None of the above   Stress urinary incontinence No       LABS/IMAGING/MEDICAL RECORDS REVIEW:   Hemoglobin A1C   Date Value Ref Range Status   04/04/2023 5.4 0.0 - 5.6 % Final     Comment:     Normal <5.7%   Prediabetes 5.7-6.4%    Diabetes 6.5% or higher     Note: Adopted from ADA consensus guidelines.     Vitamin D, Total (25-Hydroxy)   Date Value Ref Range Status   06/26/2024 82 (H) 20 - 50 ng/mL Final     Comment:     toxicity possible     Parathyroid Hormone Intact   Date Value Ref Range Status   06/26/2024 29 15 - 65 pg/mL Final     Vitamin B12   Date Value Ref Range Status   06/26/2024 1,171 232 - 1,245 pg/mL Final     Hemoglobin   Date Value Ref Range Status   06/26/2024 13.2 11.7 - 15.7 g/dL Final     Ferritin   Date Value Ref Range Status   06/26/2024 57 11 - 328 ng/mL Final     Iron   Date Value Ref Range Status   06/26/2024 79 37 - 145 ug/dL Final     Iron Binding Capacity   Date Value Ref Range Status   06/26/2024 293 240 - 430 ug/dL Final     Iron Sat Index   Date Value Ref Range Status   06/26/2024 27 15 - 46 % Final   04/04/2023 22 15 - 46 % Final     Vitamin A   Date Value Ref Range Status   04/04/2023 0.65 0.30 - 1.20 mg/L Final     Creatinine   Date Value Ref Range Status   12/04/2024 1.00 (H)  "0.51 - 0.95 mg/dL Final     Urea Nitrogen   Date Value Ref Range Status   12/04/2024 15.8 6.0 - 20.0 mg/dL Final     ALT   Date Value Ref Range Status   12/01/2023 20 0 - 50 U/L Final     Comment:     Reference intervals for this test were updated on 6/12/2023 to more accurately reflect our healthy population. There may be differences in the flagging of prior results with similar values performed with this method. Interpretation of those prior results can be made in the context of the updated reference intervals.       AST   Date Value Ref Range Status   12/01/2023 28 0 - 45 U/L Final     Comment:     Reference intervals for this test were updated on 6/12/2023 to more accurately reflect our healthy population. There may be differences in the flagging of prior results with similar values performed with this method. Interpretation of those prior results can be made in the context of the updated reference intervals.        BP Readings from Last 6 Encounters:   12/31/24 (!) 153/86   12/09/24 138/86   12/04/24 128/82   07/08/24 120/73   09/29/23 117/78   05/22/23 133/76       Pulse Readings from Last 6 Encounters:   12/31/24 80   12/09/24 70   12/04/24 73   07/08/24 58   09/29/23 80   05/22/23 63         PHYSICAL EXAMINATION:  Ht 5' 7\" (1.702 m)   Wt 261 lb (118.4 kg)   LMP 05/18/2010   BMI 40.88 kg/m    GENERAL: Healthy, alert and no distress  RESP: No audible wheeze, cough, or visible cyanosis.  No visible retractions or increased work of breathing.    SKIN: Visible skin clear. No significant rash, abnormal pigmentation or lesions.  PSYCH: Mentation appears normal, affect normal/bright, judgement and insight intact    COUNSELING PROVIDED:  We reviewed the important post op bariatric recommendations:  eating 3 meals daily  eating protein first, getting >60gm protein daily  eating slowly, chewing food well  avoiding/limiting calorie containing beverages  avoiding fluids 30 minutes before, during, and after " meals  limiting restaurant or cafeteria eating to twice a week or less  Pt reminded to avoid marginal ulcers she should avoid tobacco at all, alcohol in excess, caffeine, and NSAIDS (unless indicated for cardioprotection or otherwise and opposed by a PPI).  Pt encouraged to establish and maintain a consistent physical activity routine, 6-8 hours of restorative sleep each night and optimal stress management.  Pt counseled on the importance of life long vitamin supplementation and life long follow up.

## 2025-03-11 ENCOUNTER — VIRTUAL VISIT (OUTPATIENT)
Dept: SURGERY | Facility: CLINIC | Age: 60
End: 2025-03-11
Payer: COMMERCIAL

## 2025-03-11 VITALS — HEIGHT: 67 IN | BODY MASS INDEX: 40.97 KG/M2 | WEIGHT: 261 LBS

## 2025-03-11 DIAGNOSIS — E66.813 CLASS 3 OBESITY: Primary | ICD-10-CM

## 2025-03-11 DIAGNOSIS — Z98.84 BARIATRIC SURGERY STATUS: ICD-10-CM

## 2025-03-11 DIAGNOSIS — K91.2 POSTSURGICAL MALABSORPTION: ICD-10-CM

## 2025-03-11 PROCEDURE — 98006 SYNCH AUDIO-VIDEO EST MOD 30: CPT | Performed by: PHYSICIAN ASSISTANT

## 2025-03-11 PROCEDURE — G2211 COMPLEX E/M VISIT ADD ON: HCPCS | Performed by: PHYSICIAN ASSISTANT

## 2025-03-11 RX ORDER — METFORMIN HYDROCHLORIDE 500 MG/1
TABLET, EXTENDED RELEASE ORAL
Qty: 270 TABLET | Refills: 1 | Status: SHIPPED | OUTPATIENT
Start: 2025-03-11 | End: 2025-09-28

## 2025-03-11 RX ORDER — NALTREXONE HYDROCHLORIDE 50 MG/1
50 TABLET, FILM COATED ORAL
Qty: 90 TABLET | Refills: 1 | Status: SHIPPED | OUTPATIENT
Start: 2025-03-11

## 2025-03-11 NOTE — ASSESSMENT & PLAN NOTE
Continue taking recommended post-op vitamins.  Labs ordered per protocol to be drawn before in July appointment

## 2025-03-11 NOTE — PATIENT INSTRUCTIONS
Nice to talk with you today. Below is the plan discussed.-  AMBER Hathaway      Pt Instructions:  Start Metformin  Directions: Take 1 tablet by mouth daily with a meal for 1 week, then increase to 2 tablets daily with a meal, if tolerating can increase to 3 tablets daily with a meal or at bedtime.     Continue current vitamins.     Labs ordered.  Please call [phone] to set up a lab appt in 3-4 months.     Restart naltrexone daily in afternoon.     Follow up:    Call [phone] to schedule next visit in July for annual bariatric visit with PA and dietician.     METFORMIN    Metformin is a medication that is used to assist with lowering blood sugars in patients that have Pre-Diabetes or Diabetes. It has also been found to help with modest weight loss.    Metformin helps to lower blood sugars by lowering the amount of sugar (glucose) your liver produces that would otherwise cause your blood sugar to increase. It also makes your body respond better to insulin (the product that lowers your blood sugar).     Emerging research reported in journals suggests metformin may also lead to weight loss as a result of changes in the appetite centers of the brain, shifts in the gut microbiome, and reversal of metabolic changes that usually happen with age.    Starting instructions:  Take 1 tablet by mouth daily with a meal for 1 week, then increase to 2 tablets daily with a meal, if tolerating can increase to 3 tablets daily with a meal or at bedtime.     Side-effects. Metformin is generally well tolerated. The main side-effects we see are: Diarrhea, nausea and stomach upset - this tends to subside over time as your body gets used to the medication. Taking this medications with food will lower these side effects.    For any questions or concerns please send a iNEWiT message to our team or call our weight management call center at [phone].     In order to get refills of this or any medication we prescribe you must be seen  in the medical weight mgmt clinic every 6 months.

## 2025-03-11 NOTE — ASSESSMENT & PLAN NOTE
Patient has lost weight through nutrition modification.  Using naltrexone intermittent.  Return to daily use of naltrexone.  Start metformin.  Creatinine 1.0 GFR WNL.  Risks/ benefits and possible side effects were discussed and questions were answered. Written information was given.

## 2025-04-10 ENCOUNTER — TELEPHONE (OUTPATIENT)
Dept: FAMILY MEDICINE | Facility: CLINIC | Age: 60
End: 2025-04-10
Payer: COMMERCIAL

## 2025-04-10 DIAGNOSIS — Z13.6 SCREENING FOR CARDIOVASCULAR CONDITION: Primary | ICD-10-CM

## 2025-04-10 NOTE — TELEPHONE ENCOUNTER
Order/Referral Request    Who is requesting: pt    Orders being requested: annual labs    Reason service is needed/diagnosis: annual labs    When are orders needed by: asap    Has this been discussed with Provider: No    Does patient have a preference on a Group/Provider/Facility? [facility]    Does patient have an appointment scheduled?: Yes: 7/21    Where to send orders: Place orders within Epic    Could we send this information to you in [employer] or would you prefer to receive a phone call?:   Patient would like to be contacted via [employer]

## 2025-06-09 NOTE — PROGRESS NOTES
"Alta is a 59 year old who is being evaluated via a billable video visit.      The patient has been notified of following:     \"This video visit will be conducted via a call between you and your physician/provider. We have found that certain health care needs can be provided without the need for an in-person physical exam.  This service lets us provide the care you need with a video conversation.  If a prescription is necessary we can send it directly to your pharmacy.  If lab work is needed we can place an order for that and you can then stop by our lab to have the test done at a later time.    Video visits are billed at different rates depending on your insurance coverage.  Please reach out to your insurance provider with any questions.    If during the course of the call the physician/provider feels a video visit is not appropriate, you will not be charged for this service.\"    Patient has given verbal consent for Video visit? Yes    How would you like to obtain your AVS? MyChart    If the video visit is dropped, the invitation should be resent by: Text to cell phone: [phone]    Will anyone else be joining your video visit? No    I    Video-Visit Details    Type of service:  Video Visit    Originating Location (pt. Location): Home    Distant Location (provider location):   Off-Site - Provider Home Office    Platform used for Video Visit: [employer]    Video Start Time: 9:03 AM    Video End Time:9:30 AM    Return Bariatric Surgery Note    RE: Jenae Miller  MR#: [id number]  : 1965  VISIT DATE: 6/10/2025    Dear Lola Bray,    I had the pleasure of seeing your patient, Jenae Miller, in my post-bariatric surgery assessment clinic.    Assessment & Plan   Problem List Items Addressed This Visit       Bariatric surgery status    2015 Gastric Sleeve  July Janet López                Class 3 obesity (H) - Primary    Patient did not tolerate metformin due to SE.  Will " discontinue.  Naltrexone helps with cravings but not taking consistently.   Using naltrexone intermittent. Return to daily use of naltrexone at supper to help with nighttime cravings. RD follow up.     Goals:  Rid house of snack food         Relevant Medications    naltrexone (DEPADE/REVIA) 50 MG tablet    Postsurgical malabsorption    Continue taking recommended post-op vitamins.  Labs ordered in March, pt to have drawn.              AOM Considerations: 6/10/2025  Phentermine: Stop taking in 2021 due to elevated blood pressure  Topiramate: SE: Hives  GLP-1: not covered.  Naltrexone: Currently on, tolerated, helps with cravings  Wellbutrin: Stopped due to SE. Crazy vivid dreams, dry mouth, swelling in my ankles, excessive sweating   Metformin: causes diarrhea.    Contrave: No AOM coverage  Qsymia: No AOM coverage       PATIENT INSTRUCTIONS:  Stop metformin  Restart naltrexone and take with supper to help with nighttime cravings  Labs ordered.  Please call [phone] to set up a lab appt.   See dietitian  Continue current vitamins    Goals:  Red house of snacks    Follow up:    Call [phone] to schedule next visit in 3 months    31 minutes spent on the date of the encounter doing chart review, history and exam, result review, counseling, developing plan of care, documentation, and further activities as noted        CHIEF COMPLAINT: Post-bariatric surgery follow-up    HISTORY OF PRESENT ILLNESS:      6/9/2025     6:48 PM   Questions Regarding Prior Weight Loss Surgery Reviewed With Patient   I had the following weight loss procedure Sleeve Gastrectomy   What year was your surgery? 2015   How has your weight changed since your last visit? I have stayed about the same   Do you currently have any of the following Hypertension (high blood pressure)?   Do you have any concerns today? No     Started metformin in March.  Has not been taking consistently.  When getting up to 3 pills was causing stomach upset.  Has not  been taking naltrexone.  Likes to have some cocktails- wants to make sure naltrexone is ok to take with this.  In reading notes from past naltrexone was helpful for cravings.    Still has diet mentality.  Strict and then falls off the wagon.  Is back on track working out with a .  Started drinking soda again- 1 can a day both diet and regular.        Weight History:      6/9/2025     6:48 PM   --   What is your highest lifetime weight? 292   What is your lowest weight since surgery? (In pounds) 190     Initial Weight (lbs): 279 lbs  Weight: 267 lb (121.1 kg)  Last Visits Weight: 261 lb (118.4 kg)  Cumulative weight loss (lbs): 12  Weight Loss Percentage: 4.3%    Wt Readings from Last 10 Encounters:   06/10/25 267 lb (121.1 kg)   03/11/25 261 lb (118.4 kg)   12/31/24 273 lb 11.2 oz (124.1 kg)   12/16/24 268 lb 6.4 oz (121.7 kg)   12/09/24 268 lb 6.4 oz (121.7 kg)   12/04/24 271 lb (122.9 kg)   09/25/24 270 lb (122.5 kg)   09/23/24 270 lb (122.5 kg)   07/29/24 267 lb (121.1 kg)   07/08/24 267 lb (121.1 kg)        VITAMINS AND MINERALS:  2 Multivitamin with Minerals (Centrum Adult AM + lunch)  1200 mg Calcium With Vitamin D (TUMS; twice daily)  Vitamin D held d/t labs  1000 mcg Vitamin B-12 sublingual 3x/week  Vitamin B1 100mg 1x/week  No iron needed; post-menopausal         6/9/2025     6:48 PM   Questions Regarding Co-Morbidities and Health Concerns Reviewed With Patient   Pre-diabetes Never   Diabetes II Never   High Blood Pressure Stayed the same   High cholesterol Never   Heartburn/Reflux Gone away   Sleep apnea Stayed the same   PCOS Never   Back pain Stayed the same   Joint pain Stayed the same   Lower leg swelling Stayed the same           6/9/2025     6:48 PM   Eating Habits   How many meals do you eat per day? 3   Do you snack between meals? Yes   How much food are you eating at each meal? 1/2 cup to 1 cup   Are you able to separate your meals and liquids by at least 30 minutes? Sometimes- improved  since seen previously   Are you able to avoid liquid calories? Sometimes           6/9/2025     6:48 PM   Exercise Questions Reviewed With Patient   How often do you exercise? Daily   What is the duration of your exercise (in minutes)? 60+ Minutes   What types of exercise do you do? walking    gym membership    weightlifting    other   What keeps you from being more active? Too tired   Doing well here    Social History:      6/9/2025     6:48 PM   --   Are you smoking? No   Are you drinking alcohol? Yes   How much alcohol? 2/week       Medications:  Current Outpatient Medications   Medication Sig Dispense Refill    naltrexone (DEPADE/REVIA) 50 MG tablet Take 1 tablet (50 mg) by mouth daily (with dinner). 90 tablet 1    azelaic acid (FINACIA) 15 % external gel Apply topically daily.      CALCIUM CITRATE PO Take 500 mg by mouth 2 times daily      CENTRUM OR TABS Take 1 tablet by mouth 2 times daily      cod liver oil CAPS capsule Take 1 capsule by mouth daily      Cyanocobalamin (VITAMIN B 12 PO) Take 2,500 mcg by mouth twice a week      CycloSPORINE (RESTASIS OP) Apply  to eye 2 times daily.      dapsone (ACZONE) 7.5 % gel Apply topically daily.      fluticasone (FLONASE) 50 MCG/ACT nasal spray Spray 2 sprays into both nostrils daily. 15.8 mL 11    loratadine (CLARITIN) 10 MG tablet Take 10 mg by mouth daily.      losartan-hydrochlorothiazide (HYZAAR) 50-12.5 MG tablet Take 1 tablet by mouth daily 90 tablet 3    Lutein 10 MG TABS Take 1 tablet by mouth daily      thiamine (VITAMIN B1) 100 MG tablet Take 100 mg by mouth once a week      TURMERIC CURCUMIN PO Take 1 capsule by mouth daily as needed (pain, inflammation)       No current facility-administered medications for this visit.         6/9/2025     6:48 PM   --   Do you avoid NSAIDs such as (Ibuprofen, Aleve, Naproxen, Advil)? Yes       ROS:  GI:       6/9/2025     6:48 PM   --   Vomiting No   Diarrhea No   Constipation No   Swallowing trouble No   Abdominal pain  No   Heartburn No     Skin:       6/9/2025     6:48 PM   BAR RBS ROS - SKIN   Rash in skin folds No     Psych:       6/9/2025     6:48 PM   --   Depression No   Anxiety Yes     Female Only:       6/9/2025     6:48 PM   BAR RBS ROS -    Female only None of the above   Stress urinary incontinence No       LABS/IMAGING/MEDICAL RECORDS REVIEW:   Hemoglobin A1C   Date Value Ref Range Status   04/04/2023 5.4 0.0 - 5.6 % Final     Comment:     Normal <5.7%   Prediabetes 5.7-6.4%    Diabetes 6.5% or higher     Note: Adopted from ADA consensus guidelines.     Vitamin D, Total (25-Hydroxy)   Date Value Ref Range Status   06/26/2024 82 (H) 20 - 50 ng/mL Final     Comment:     toxicity possible     Parathyroid Hormone Intact   Date Value Ref Range Status   06/26/2024 29 15 - 65 pg/mL Final     Vitamin B12   Date Value Ref Range Status   06/26/2024 1,171 232 - 1,245 pg/mL Final     Hemoglobin   Date Value Ref Range Status   06/26/2024 13.2 11.7 - 15.7 g/dL Final     Ferritin   Date Value Ref Range Status   06/26/2024 57 11 - 328 ng/mL Final     Iron   Date Value Ref Range Status   06/26/2024 79 37 - 145 ug/dL Final     Iron Binding Capacity   Date Value Ref Range Status   06/26/2024 293 240 - 430 ug/dL Final     Iron Sat Index   Date Value Ref Range Status   06/26/2024 27 15 - 46 % Final   04/04/2023 22 15 - 46 % Final     Vitamin A   Date Value Ref Range Status   04/04/2023 0.65 0.30 - 1.20 mg/L Final     Creatinine   Date Value Ref Range Status   12/04/2024 1.00 (H) 0.51 - 0.95 mg/dL Final     Urea Nitrogen   Date Value Ref Range Status   12/04/2024 15.8 6.0 - 20.0 mg/dL Final     ALT   Date Value Ref Range Status   12/01/2023 20 0 - 50 U/L Final     Comment:     Reference intervals for this test were updated on 6/12/2023 to more accurately reflect our healthy population. There may be differences in the flagging of prior results with similar values performed with this method. Interpretation of those prior results can be made  "in the context of the updated reference intervals.       AST   Date Value Ref Range Status   12/01/2023 28 0 - 45 U/L Final     Comment:     Reference intervals for this test were updated on 6/12/2023 to more accurately reflect our healthy population. There may be differences in the flagging of prior results with similar values performed with this method. Interpretation of those prior results can be made in the context of the updated reference intervals.        BP Readings from Last 6 Encounters:   06/06/25 123/81   12/31/24 (!) 153/86   12/09/24 138/86   12/04/24 128/82   07/08/24 120/73   09/29/23 117/78       Pulse Readings from Last 6 Encounters:   06/06/25 65   12/31/24 80   12/09/24 70   12/04/24 73   07/08/24 58   09/29/23 80         PHYSICAL EXAMINATION:  Ht 5' 7\" (1.702 m)   Wt 267 lb (121.1 kg)   LMP 05/18/2010   BMI 41.82 kg/m    GENERAL: Healthy, alert and no distress  RESP: No audible wheeze, cough, or visible cyanosis.  No visible retractions or increased work of breathing.    SKIN: Visible skin clear. No significant rash, abnormal pigmentation or lesions.  PSYCH: Mentation appears normal, affect normal/bright, judgement and insight intact    COUNSELING PROVIDED:  We reviewed the important post op bariatric recommendations:  eating 3 meals daily  eating protein first, getting >60gm protein daily  eating slowly, chewing food well  avoiding/limiting calorie containing beverages  avoiding fluids 30 minutes before, during, and after meals  limiting restaurant or cafeteria eating to twice a week or less  Pt reminded to avoid marginal ulcers she should avoid tobacco at all, alcohol in excess, caffeine, and NSAIDS (unless indicated for cardioprotection or otherwise and opposed by a PPI).  Pt encouraged to establish and maintain a consistent physical activity routine, 6-8 hours of restorative sleep each night and optimal stress management.  Pt counseled on the importance of life long vitamin supplementation " and life long follow up.

## 2025-06-10 ENCOUNTER — VIRTUAL VISIT (OUTPATIENT)
Dept: SURGERY | Facility: CLINIC | Age: 60
End: 2025-06-10
Payer: COMMERCIAL

## 2025-06-10 VITALS — WEIGHT: 267 LBS | BODY MASS INDEX: 41.91 KG/M2 | HEIGHT: 67 IN

## 2025-06-10 DIAGNOSIS — Z98.84 BARIATRIC SURGERY STATUS: ICD-10-CM

## 2025-06-10 DIAGNOSIS — E66.813 CLASS 3 OBESITY (H): Primary | ICD-10-CM

## 2025-06-10 DIAGNOSIS — K91.2 POSTSURGICAL MALABSORPTION: ICD-10-CM

## 2025-06-10 PROCEDURE — 98006 SYNCH AUDIO-VIDEO EST MOD 30: CPT | Performed by: PHYSICIAN ASSISTANT

## 2025-06-10 RX ORDER — NALTREXONE HYDROCHLORIDE 50 MG/1
50 TABLET, FILM COATED ORAL
Qty: 90 TABLET | Refills: 1 | Status: SHIPPED | OUTPATIENT
Start: 2025-06-10

## 2025-06-10 NOTE — ASSESSMENT & PLAN NOTE
Patient did not tolerate metformin due to SE.  Will discontinue.  Naltrexone helps with cravings but not taking consistently.   Using naltrexone intermittent. Return to daily use of naltrexone at supper to help with nighttime cravings. RD follow up.     Goals:  Rid house of snack food

## 2025-06-10 NOTE — PATIENT INSTRUCTIONS
Nice to talk with you today. Below is the plan discussed.-  AMBER Hathaway      Pt Instructions:  Stop metformin  Restart naltrexone and take with supper to help with nighttime cravings  Labs ordered.  Please call [phone] to set up a lab appt.   See dietitian  Continue current vitamins    Goals:  Red house of snacks    Follow up:    Call [phone] to schedule next visit in 3 months

## 2025-06-18 PROBLEM — F41.1 GAD (GENERALIZED ANXIETY DISORDER): Status: ACTIVE | Noted: 2025-06-18

## 2025-07-11 SDOH — HEALTH STABILITY: PHYSICAL HEALTH: ON AVERAGE, HOW MANY MINUTES DO YOU ENGAGE IN EXERCISE AT THIS LEVEL?: 60 MIN

## 2025-07-11 SDOH — HEALTH STABILITY: PHYSICAL HEALTH: ON AVERAGE, HOW MANY DAYS PER WEEK DO YOU ENGAGE IN MODERATE TO STRENUOUS EXERCISE (LIKE A BRISK WALK)?: 7 DAYS

## 2025-07-11 ASSESSMENT — ASTHMA QUESTIONNAIRES
ACT_TOTALSCORE: 25
QUESTION_4 LAST FOUR WEEKS HOW OFTEN HAVE YOU USED YOUR RESCUE INHALER OR NEBULIZER MEDICATION (SUCH AS ALBUTEROL): NOT AT ALL
QUESTION_3 LAST FOUR WEEKS HOW OFTEN DID YOUR ASTHMA SYMPTOMS (WHEEZING, COUGHING, SHORTNESS OF BREATH, CHEST TIGHTNESS OR PAIN) WAKE YOU UP AT NIGHT OR EARLIER THAN USUAL IN THE MORNING: NOT AT ALL
QUESTION_2 LAST FOUR WEEKS HOW OFTEN HAVE YOU HAD SHORTNESS OF BREATH: NOT AT ALL
QUESTION_5 LAST FOUR WEEKS HOW WOULD YOU RATE YOUR ASTHMA CONTROL: COMPLETELY CONTROLLED
QUESTION_1 LAST FOUR WEEKS HOW MUCH OF THE TIME DID YOUR ASTHMA KEEP YOU FROM GETTING AS MUCH DONE AT WORK, SCHOOL OR AT HOME: NONE OF THE TIME

## 2025-07-11 ASSESSMENT — SOCIAL DETERMINANTS OF HEALTH (SDOH): HOW OFTEN DO YOU GET TOGETHER WITH FRIENDS OR RELATIVES?: TWICE A WEEK

## 2025-07-15 ENCOUNTER — LAB (OUTPATIENT)
Dept: LAB | Facility: CLINIC | Age: 60
End: 2025-07-15
Payer: COMMERCIAL

## 2025-07-15 DIAGNOSIS — E66.813 CLASS 3 OBESITY (H): ICD-10-CM

## 2025-07-15 DIAGNOSIS — K91.2 POSTSURGICAL MALABSORPTION: ICD-10-CM

## 2025-07-15 DIAGNOSIS — Z13.6 SCREENING FOR CARDIOVASCULAR CONDITION: ICD-10-CM

## 2025-07-15 DIAGNOSIS — Z00.00 HEALTHCARE MAINTENANCE: Primary | ICD-10-CM

## 2025-07-15 LAB
ALBUMIN SERPL BCG-MCNC: 4.3 G/DL (ref 3.5–5.2)
ALP SERPL-CCNC: 96 U/L (ref 40–150)
ALT SERPL W P-5'-P-CCNC: 14 U/L (ref 0–50)
ANION GAP SERPL CALCULATED.3IONS-SCNC: 10 MMOL/L (ref 7–15)
AST SERPL W P-5'-P-CCNC: 22 U/L (ref 0–45)
BILIRUB SERPL-MCNC: 0.7 MG/DL
BUN SERPL-MCNC: 13.9 MG/DL (ref 6–20)
CALCIUM SERPL-MCNC: 9.8 MG/DL (ref 8.8–10.4)
CHLORIDE SERPL-SCNC: 103 MMOL/L (ref 98–107)
CHOLEST SERPL-MCNC: 200 MG/DL
CREAT SERPL-MCNC: 0.98 MG/DL (ref 0.51–0.95)
EGFRCR SERPLBLD CKD-EPI 2021: 67 ML/MIN/1.73M2
ERYTHROCYTE [DISTWIDTH] IN BLOOD BY AUTOMATED COUNT: 12.7 % (ref 10–15)
FASTING STATUS PATIENT QL REPORTED: ABNORMAL
FASTING STATUS PATIENT QL REPORTED: ABNORMAL
FERRITIN SERPL-MCNC: 48 NG/ML (ref 11–328)
GLUCOSE SERPL-MCNC: 95 MG/DL (ref 70–99)
HCO3 SERPL-SCNC: 27 MMOL/L (ref 22–29)
HCT VFR BLD AUTO: 42.3 % (ref 35–47)
HDLC SERPL-MCNC: 95 MG/DL
HGB BLD-MCNC: 13.5 G/DL (ref 11.7–15.7)
IRON BINDING CAPACITY (ROCHE): 322 UG/DL (ref 240–430)
IRON SATN MFR SERPL: 28 % (ref 15–46)
IRON SERPL-MCNC: 89 UG/DL (ref 37–145)
LDLC SERPL CALC-MCNC: 82 MG/DL
MCH RBC QN AUTO: 30.1 PG (ref 26.5–33)
MCHC RBC AUTO-ENTMCNC: 31.9 G/DL (ref 31.5–36.5)
MCV RBC AUTO: 94 FL (ref 78–100)
NONHDLC SERPL-MCNC: 105 MG/DL
PLATELET # BLD AUTO: 273 10E3/UL (ref 150–450)
POTASSIUM SERPL-SCNC: 4.1 MMOL/L (ref 3.4–5.3)
PROT SERPL-MCNC: 6.7 G/DL (ref 6.4–8.3)
PTH-INTACT SERPL-MCNC: 25 PG/ML (ref 15–65)
RBC # BLD AUTO: 4.49 10E6/UL (ref 3.8–5.2)
SODIUM SERPL-SCNC: 140 MMOL/L (ref 135–145)
TRIGL SERPL-MCNC: 116 MG/DL
VIT B12 SERPL-MCNC: 1844 PG/ML (ref 232–1245)
VIT D+METAB SERPL-MCNC: 72 NG/ML (ref 20–50)
WBC # BLD AUTO: 6.5 10E3/UL (ref 4–11)

## 2025-07-15 PROCEDURE — 83550 IRON BINDING TEST: CPT

## 2025-07-15 PROCEDURE — 80053 COMPREHEN METABOLIC PANEL: CPT

## 2025-07-15 PROCEDURE — 83540 ASSAY OF IRON: CPT

## 2025-07-15 PROCEDURE — 82306 VITAMIN D 25 HYDROXY: CPT

## 2025-07-15 PROCEDURE — 80061 LIPID PANEL: CPT

## 2025-07-15 PROCEDURE — 83970 ASSAY OF PARATHORMONE: CPT

## 2025-07-15 PROCEDURE — 82607 VITAMIN B-12: CPT

## 2025-07-15 PROCEDURE — 82728 ASSAY OF FERRITIN: CPT

## 2025-07-15 PROCEDURE — 85027 COMPLETE CBC AUTOMATED: CPT

## 2025-07-16 ENCOUNTER — OFFICE VISIT (OUTPATIENT)
Dept: FAMILY MEDICINE | Facility: CLINIC | Age: 60
End: 2025-07-16
Attending: FAMILY MEDICINE
Payer: COMMERCIAL

## 2025-07-16 VITALS
RESPIRATION RATE: 18 BRPM | HEART RATE: 63 BPM | TEMPERATURE: 97.5 F | OXYGEN SATURATION: 97 % | HEIGHT: 67 IN | WEIGHT: 270.8 LBS | DIASTOLIC BLOOD PRESSURE: 85 MMHG | SYSTOLIC BLOOD PRESSURE: 125 MMHG | BODY MASS INDEX: 42.5 KG/M2

## 2025-07-16 DIAGNOSIS — Z00.00 ROUTINE GENERAL MEDICAL EXAMINATION AT A HEALTH CARE FACILITY: Primary | ICD-10-CM

## 2025-07-16 DIAGNOSIS — I10 HYPERTENSION, GOAL BELOW 140/90: ICD-10-CM

## 2025-07-16 DIAGNOSIS — G47.33 OSA (OBSTRUCTIVE SLEEP APNEA): ICD-10-CM

## 2025-07-16 DIAGNOSIS — E66.813 CLASS 3 OBESITY (H): ICD-10-CM

## 2025-07-16 DIAGNOSIS — M72.2 PLANTAR FASCIITIS OF LEFT FOOT: ICD-10-CM

## 2025-07-16 PROBLEM — K91.2 POSTSURGICAL MALABSORPTION: Status: RESOLVED | Noted: 2023-03-30 | Resolved: 2025-07-16

## 2025-07-16 PROCEDURE — G2211 COMPLEX E/M VISIT ADD ON: HCPCS | Performed by: FAMILY MEDICINE

## 2025-07-16 PROCEDURE — 3074F SYST BP LT 130 MM HG: CPT | Performed by: FAMILY MEDICINE

## 2025-07-16 PROCEDURE — 1126F AMNT PAIN NOTED NONE PRSNT: CPT | Performed by: FAMILY MEDICINE

## 2025-07-16 PROCEDURE — 3079F DIAST BP 80-89 MM HG: CPT | Performed by: FAMILY MEDICINE

## 2025-07-16 PROCEDURE — 99214 OFFICE O/P EST MOD 30 MIN: CPT | Mod: 25 | Performed by: FAMILY MEDICINE

## 2025-07-16 PROCEDURE — 99396 PREV VISIT EST AGE 40-64: CPT | Performed by: FAMILY MEDICINE

## 2025-07-16 RX ORDER — LOSARTAN POTASSIUM AND HYDROCHLOROTHIAZIDE 12.5; 5 MG/1; MG/1
1 TABLET ORAL DAILY
Qty: 90 TABLET | Refills: 4 | Status: SHIPPED | OUTPATIENT
Start: 2025-07-16

## 2025-07-16 ASSESSMENT — PAIN SCALES - GENERAL: PAINLEVEL_OUTOF10: NO PAIN (0)

## 2025-07-16 NOTE — PROGRESS NOTES
"Preventive Care Visit  [facility] JACEY Bray MD, Family Medicine  Jul 16, 2025      Assessment & Plan     Routine general medical examination at a health care facility    Hypertension, goal below 140/90  Well controlled with medications without side effects.   - losartan-hydrochlorothiazide (HYZAAR) 50-12.5 MG tablet; Take 1 tablet by mouth daily.    Class 3 obesity (H)  Continue plan per bariatrics     Plantar fasciitis of left foot  - Orthopedic  Referral; Future    RITU (obstructive sleep apnea)  - Adult Sleep Eval & Management  Referral; Future      BMI  Estimated body mass index is 42.1 kg/m  as calculated from the following:    Height as of this encounter: 1.708 m (5' 7.25\").    Weight as of this encounter: 122.8 kg (270 lb 12.8 oz).   Weight management plan: Patient referred to endocrine and/or weight management specialty    Counseling  Appropriate preventive services were addressed with this patient via screening, questionnaire, or discussion as appropriate for fall prevention, nutrition, physical activity, Tobacco-use cessation, social engagement, weight loss and cognition.  Checklist reviewing preventive services available has been given to the patient.  Reviewed patient's diet, addressing concerns and/or questions.   She is at risk for psychosocial distress and has been provided with information to reduce risk.   Reviewed preventive health counseling, as reflected in patient instructions    The longitudinal plan of care for the diagnosis(es)/condition(s) as documented were addressed during this visit. Due to the added complexity in care, I will continue to support Alta in the subsequent management and with ongoing continuity of care.      Follow-up    Follow-up Visit   Expected date:  Jul 16, 2026 (Approximate)      Follow Up Appointment Details:     Follow-up with whom?: PCP    Follow-Up for what?: Adult Preventive    How?: In Person                 Subjective "   Alta is a 59 year old, presenting for the following:  Physical        7/16/2025     8:49 AM   Additional Questions   Roomed by Shira   Accompanied by self         7/16/2025     8:49 AM   Patient Reported Additional Medications   Patient reports taking the following new medications none          HPI   Hypertension well controlled on current medications without side effects, chest pain, or dyspnea.    ROS: fatigue, anxiety, left heel pain, left shoulder pain, bilateral knee instability and occasional right knee pain        Advance Care Planning    Discussed advance care planning with patient; informed AVS has link to [facility].        7/11/2025   General Health   How would you rate your overall physical health? Good   Feel stress (tense, anxious, or unable to sleep) Very much   (!) STRESS CONCERN      7/11/2025   Nutrition   Three or more servings of calcium each day? Yes   Diet: Regular (no restrictions)   How many servings of fruit and vegetables per day? (!) 2-3   How many sweetened beverages each day? 0-1         7/11/2025   Exercise   Days per week of moderate/strenous exercise 7 days   Average minutes spent exercising at this level 60 min         7/11/2025   Social Factors   Frequency of gathering with friends or relatives Twice a week   Worry food won't last until get money to buy more No   Food not last or not have enough money for food? No   Do you have housing? (Housing is defined as stable permanent housing and does not include staying outside in a car, in a tent, in an abandoned building, in an overnight shelter, or couch-surfing.) Yes   Are you worried about losing your housing? No   Lack of transportation? No   Unable to get utilities (heat,electricity)? No         7/11/2025   Fall Risk   Fallen 2 or more times in the past year? No   Trouble with walking or balance? No          7/11/2025   Dental   Dentist two times every year? Yes           Today's PHQ-2 Score:       1/6/2025     7:04 PM   PHQ-2  ( 1999 [employer])   Q1: Little interest or pleasure in doing things 0   Q2: Feeling down, depressed or hopeless 0   PHQ-2 Score 0    Q1: Little interest or pleasure in doing things Not at all   Q2: Feeling down, depressed or hopeless Not at all   PHQ-2 Score 0       Patient-reported         7/11/2025   Substance Use   Alcohol more than 3/day or more than 7/wk No   Do you use any other substances recreationally? No     Social History     Tobacco Use    Smoking status: Never     Passive exposure: Never    Smokeless tobacco: Never   Vaping Use    Vaping status: Never Used   Substance Use Topics    Alcohol use: Yes     Comment: One or two drinks a week    Drug use: No           10/15/2021   LAST FHS-7 RESULTS   1st degree relative breast or ovarian cancer Yes    Any relative bilateral breast cancer No    Any male have breast cancer No    Any ONE woman have BOTH breast AND ovarian cancer No    Any woman with breast cancer before 50yrs No    2 or more relatives with breast AND/OR ovarian cancer Yes    2 or more relatives with breast AND/OR bowel cancer Yes        Proxy-reported        Mammogram Screening - Mammogram every 1-2 years updated in Health Maintenance based on mutual decision making        7/11/2025   STI Screening   New sexual partner(s) since last STI/HIV test? No     History of abnormal Pap smear: YES - other categories - see link Cervical Cytology Screening Guidelines        8/19/2022     2:40 PM 6/19/2019    12:00 AM   PAP / HPV   PAP-ABSTRACT See Scanned Document  See Scanned Document      ASCVD Risk   The 10-year ASCVD risk score (Jerilyn AMADOR, et al., 2019) is: 2%    Values used to calculate the score:      Age: 59 years      Sex: Female      Is Non- : No      Diabetic: No      Tobacco smoker: No      Systolic Blood Pressure: 125 mmHg      Is BP treated: Yes      HDL Cholesterol: 95 mg/dL      Total Cholesterol: 200 mg/dL    Fracture Risk Assessment Tool  Link to Frax  Calculator  Use the information below to complete the Frax calculator  : 1965  Sex: female  Weight (kg): 122.8 kg (actual weight)  Height (cm): 170.8 cm  Previous Fragility Fracture:  No  History of parent with fractured hip:  No  Current Smoking:  No  Patient has been on glucocorticoids for more than 3 months (5mg/day or more): No  Rheumatoid Arthritis on Problem List:  No  Secondary Osteoporosis on Problem List:  No  Consumes 3 or more units of alcohol per day: No  Femoral Neck BMD (g/cm2)           Reviewed and updated as needed this visit by Provider   Tobacco  Allergies  Meds  Problems  Med Hx  Surg Hx  Fam Hx            Patient Active Problem List   Diagnosis    Bariatric surgery status    HX: breast cancer    Primary osteoarthritis of both knees    Monoallelic mutation of CHEK2 gene in female patient    Class 3 obesity (H)    History of colonic polyps    RITU (obstructive sleep apnea)    Cervical high risk HPV (human papillomavirus) test positive    Hypertension, goal below 140/90    Chronic rhinitis    AURELIA (generalized anxiety disorder)    Plantar fasciitis of left foot     Past Surgical History:   Procedure Laterality Date    ABDOMEN SURGERY  2015    Vertical sleeve gastrectomy    BREAST BIOPSY, RT/LT      lt , rt      COLONOSCOPY  2012    Repeat Colonoscopy in 5 yrs.    COSMETIC SURGERY   and     CRYOCAUTERY OF CERVIX      [place]    CYSTOSCOPY,DIL URETHRAL STRICTURE      age 7    HC DILATION/CURETTAGE DIAG/THER NON OB      HC TOOTH EXTRACTION W/FORCEP      HEMORRHOIDECTOMY      MASTECTOMY, BILATERAL  2010    rt sided breast ca  with reconstruction    REMOVE AND REPLACE BREAST IMPLANT PROSTHESIS Bilateral 2024    Procedure: with removal and replacement of silicone gel implants;  Surgeon: Yassine Sherman MD;  Location: [facility] OR    REVISE RECONSTRUCTED BREAST BILATERAL Bilateral 2024    Procedure: revision of bilateral breast  "reconstruction;  Surgeon: Yassine Sherman MD;  Location:  OR    TONSILLECTOMY & ADENOIDECTOMY  2005    VASCULAR SURGERY  2017    Closures, phlebectomies, sclerotherapy    ZZHC COLONOSCOPY THRU STOMA W BIOPSY/CAUTERY TUMOR/POLYP/LESION      Dr. Jackson 89, '01 Repeat     ZZHC COLONOSCOPY THRU STOMA, DIAGNOSTIC  2007            Social History     Tobacco Use    Smoking status: Never     Passive exposure: Never    Smokeless tobacco: Never   Substance Use Topics    Alcohol use: Yes     Comment: One or two drinks a week     Family History   Problem Relation Age of Onset    Obesity Mother     Breast Cancer Mother 57         at 60 of breast cancer    Colon Polyps Father         large polyps    Diabetes Father     Hypertension Father     Obesity Father     Obesity Sister     Obesity Sister     Unknown/Adopted Maternal Grandmother     Unknown/Adopted Maternal Grandfather     Cancer Paternal Grandmother         stomach    Breast Cancer Other                 Objective    Exam  /85 (BP Location: Left arm, Patient Position: Sitting, Cuff Size: Adult Large)   Pulse 63   Temp 97.5  F (36.4  C) (Temporal)   Resp 18   Ht 1.708 m (5' 7.25\")   Wt 122.8 kg (270 lb 12.8 oz)   LMP 2010   SpO2 97%   BMI 42.10 kg/m     Estimated body mass index is 42.1 kg/m  as calculated from the following:    Height as of this encounter: 1.708 m (5' 7.25\").    Weight as of this encounter: 122.8 kg (270 lb 12.8 oz).    Physical Exam  GENERAL: alert and no distress  EYES: Eyes grossly normal to inspection, PERRL and conjunctivae and sclerae normal  HENT: ear canals and TM's normal, nose and mouth without ulcers or lesions  NECK: no adenopathy, no asymmetry, masses, or scars  RESP: lungs clear to auscultation - no rales, rhonchi or wheezes  CV: regular rate and rhythm, normal S1 S2, no S3 or S4, no murmur, click or rub, no peripheral edema  ABDOMEN: soft, nontender, no hepatosplenomegaly, no masses " and bowel sounds normal  MS: no gross musculoskeletal defects noted, no edema  SKIN: no suspicious lesions or rashes  NEURO: Normal strength and tone, mentation intact and speech normal  PSYCH: mentation appears normal, affect normal/bright        Signed Electronically by: Lola Bray MD

## 2025-07-16 NOTE — PATIENT INSTRUCTIONS
Patient Education   Preventive Care Advice   This is general advice given by our system to help you stay healthy. However, your care team may have specific advice just for you. Please talk to your care team about your preventive care needs.  Nutrition  Eat 5 or more servings of fruits and vegetables each day.  Try wheat bread, brown rice and whole grain pasta (instead of white bread, rice, and pasta).  Get enough calcium and vitamin D. Check the label on foods and aim for 100% of the RDA (recommended daily allowance).  Lifestyle  Exercise at least 150 minutes each week  (30 minutes a day, 5 days a week).  Do muscle strengthening activities 2 days a week. These help control your weight and prevent disease.  No smoking.  Wear sunscreen to prevent skin cancer.  Have a dental exam and cleaning every 6 months.  Yearly exams  See your health care team every year to talk about:  Any changes in your health.  Any medicines your care team has prescribed.  Preventive care, family planning, and ways to prevent chronic diseases.  Shots (vaccines)   HPV shots (up to age 26), if you've never had them before.  Hepatitis B shots (up to age 59), if you've never had them before.  COVID-19 shot: Get this shot when it's due.  Flu shot: Get a flu shot every year.  Tetanus shot: Get a tetanus shot every 10 years.  Pneumococcal, hepatitis A, and RSV shots: Ask your care team if you need these based on your risk.  Shingles shot (for age 50 and up)  General health tests  Diabetes screening:  Starting at age 35, Get screened for diabetes at least every 3 years.  If you are younger than age 35, ask your care team if you should be screened for diabetes.  Cholesterol test: At age 39, start having a cholesterol test every 5 years, or more often if advised.  Bone density scan (DEXA): At age 50, ask your care team if you should have this scan for osteoporosis (brittle bones).  Hepatitis C: Get tested at least once in your life.  STIs (sexually  transmitted infections)  Before age 24: Ask your care team if you should be screened for STIs.  After age 24: Get screened for STIs if you're at risk. You are at risk for STIs (including HIV) if:  You are sexually active with more than one person.  You don't use condoms every time.  You or a partner was diagnosed with a sexually transmitted infection.  If you are at risk for HIV, ask about PrEP medicine to prevent HIV.  Get tested for HIV at least once in your life, whether you are at risk for HIV or not.  Cancer screening tests  Cervical cancer screening: If you have a cervix, begin getting regular cervical cancer screening tests starting at age 21.  Breast cancer scan (mammogram): If you've ever had breasts, begin having regular mammograms starting at age 40. This is a scan to check for breast cancer.  Colon cancer screening: It is important to start screening for colon cancer at age 45.  Have a colonoscopy test every 10 years (or more often if you're at risk) Or, ask your provider about stool tests like a FIT test every year or Cologuard test every 3 years.  To learn more about your testing options, visit:   .  For help making a decision, visit:   https://bit.ly/rj93656.  Prostate cancer screening test: If you have a prostate, ask your care team if a prostate cancer screening test (PSA) at age 55 is right for you.  Lung cancer screening: If you are a current or former smoker ages 50 to 80, ask your care team if ongoing lung cancer screenings are right for you.  For informational purposes only. Not to replace the advice of your health care provider. Copyright   2023 [facility]. All rights reserved. Clinically reviewed by the [facility] Transitions Program. [employer] 947000 - REV 01/24.  Learning About Stress  What is stress?     Stress is your body's response to a hard situation. Your body can have a physical, emotional, or mental response. Stress is a fact of life for most people, and it  affects everyone differently. What causes stress for you may not be stressful for someone else.  A lot of things can cause stress. You may feel stress when you go on a job interview, take a test, or run a race. This kind of short-term stress is normal and even useful. It can help you if you need to work hard or react quickly. For example, stress can help you finish an important job on time.  Long-term stress is caused by ongoing stressful situations or events. Examples of long-term stress include long-term health problems, ongoing problems at work, or conflicts in your family. Long-term stress can harm your health.  How does stress affect your health?  When you are stressed, your body responds as though you are in danger. It makes hormones that speed up your heart, make you breathe faster, and give you a burst of energy. This is called the fight-or-flight stress response. If the stress is over quickly, your body goes back to normal and no harm is done.  But if stress happens too often or lasts too long, it can have bad effects. Long-term stress can make you more likely to get sick, and it can make symptoms of some diseases worse. If you tense up when you are stressed, you may develop neck, shoulder, or low back pain. Stress is linked to high blood pressure and heart disease.  Stress also harms your emotional health. It can make you shirley, tense, or depressed. Your relationships may suffer, and you may not do well at work or school.  What can you do to manage stress?  You can try these things to help manage stress:   Do something active. Exercise or activity can help reduce stress. Walking is a great way to get started. Even everyday activities such as housecleaning or yard work can help.  Try yoga or zehra chi. These techniques combine exercise and meditation. You may need some training at first to learn them.  Do something you enjoy. For example, listen to music or go to a movie. Practice your hobby or do volunteer  "work.  Meditate. This can help you relax, because you are not worrying about what happened before or what may happen in the future.  Do guided imagery. Imagine yourself in any setting that helps you feel calm. You can use online videos, books, or a teacher to guide you.  Do breathing exercises. For example:  From a standing position, bend forward from the waist with your knees slightly bent. Let your arms dangle close to the floor.  Breathe in slowly and deeply as you return to a standing position. Roll up slowly and lift your head last.  Hold your breath for just a few seconds in the standing position.  Breathe out slowly and bend forward from the waist.  Let your feelings out. Talk, laugh, cry, and express anger when you need to. Talking with supportive friends or family, a counselor, or a connor leader about your feelings is a healthy way to relieve stress. Avoid discussing your feelings with people who make you feel worse.  Write. It may help to write about things that are bothering you. This helps you find out how much stress you feel and what is causing it. When you know this, you can find better ways to cope.  What can you do to prevent stress?  You might try some of these things to help prevent stress:  Manage your time. This helps you find time to do the things you want and need to do.  Get enough sleep. Your body recovers from the stresses of the day while you are sleeping.  Get support. Your family, friends, and community can make a difference in how you experience stress.  Limit your news feed. Avoid or limit time on social media or news that may make you feel stressed.  Do something active. Exercise or activity can help reduce stress. Walking is a great way to get started.  Where can you learn more?  Go to https://www.[employer].net/patiented  Enter N032 in the search box to learn more about \"Learning About Stress.\"  Current as of: October 24, 2024  Content Version: 14.5 2024-2025 [employer]   Care instructions adapted under license by your healthcare professional. If you have questions about a medical condition or this instruction, always ask your healthcare professional. [employer] disclaims any warranty or liability for your use of this information.

## 2025-07-17 ENCOUNTER — PATIENT OUTREACH (OUTPATIENT)
Dept: CARE COORDINATION | Facility: CLINIC | Age: 60
End: 2025-07-17
Payer: COMMERCIAL

## 2025-07-21 ENCOUNTER — PATIENT OUTREACH (OUTPATIENT)
Dept: CARE COORDINATION | Facility: CLINIC | Age: 60
End: 2025-07-21
Payer: COMMERCIAL

## 2025-08-05 ENCOUNTER — VIRTUAL VISIT (OUTPATIENT)
Dept: SURGERY | Facility: CLINIC | Age: 60
End: 2025-08-05
Payer: COMMERCIAL

## 2025-08-05 VITALS — BODY MASS INDEX: 42.13 KG/M2 | WEIGHT: 271 LBS

## 2025-08-05 DIAGNOSIS — Z98.84 BARIATRIC SURGERY STATUS: ICD-10-CM

## 2025-08-05 DIAGNOSIS — E66.813 CLASS 3 OBESITY (H): Primary | ICD-10-CM

## 2025-08-05 DIAGNOSIS — K91.2 POSTSURGICAL MALABSORPTION: ICD-10-CM

## 2025-08-05 PROCEDURE — 97803 MED NUTRITION INDIV SUBSEQ: CPT | Performed by: DIETITIAN, REGISTERED

## (undated) DEVICE — DRSG STERI STRIP 1/2X4" R1547

## (undated) DEVICE — NDL SPINAL 22GA 3.5" QUINCKE 405181

## (undated) DEVICE — SU VICRYL 4-0 PS-2 18" UND J496H

## (undated) DEVICE — ESU PENCIL W/SMOKE EVAC NEPTUNE STRYKER 0703-046-000

## (undated) DEVICE — DRSG KERLIX 4 1/2"X4YDS ROLL 6715

## (undated) DEVICE — PACK MAJOR SBA15MAFSI

## (undated) DEVICE — SOLUTION WOUND CLEANSING 3/4OZ 10% PVP EA-L3011FB-50

## (undated) DEVICE — LINEN GOWN OVERSIZE 5408

## (undated) DEVICE — PAD CHUX UNDERPAD 23X24" 7136

## (undated) DEVICE — BAG DECANTER STERILE WHITE DYNJDEC09

## (undated) DEVICE — BNDG ELASTIC 6" DBL LENGTH UNSTERILE 6611-16

## (undated) DEVICE — SYR 10ML LL W/O NDL 302995

## (undated) DEVICE — DRAPE BREAST/CHEST 29420

## (undated) DEVICE — SOL NACL 0.9% IRRIG 1000ML BOTTLE 2F7124

## (undated) DEVICE — PREP CHLORAPREP 26ML TINTED ORANGE  260815

## (undated) DEVICE — SYR 10ML SLIP TIP W/O NDL

## (undated) DEVICE — BLADE KNIFE SURG 15 371115

## (undated) DEVICE — SU VICRYL 2-0 CT-1 27" UND J259H

## (undated) DEVICE — NDL 19GA 1.5"

## (undated) DEVICE — SU VICRYL 3-0 PS-1 18" UND J683

## (undated) DEVICE — LINEN TOWEL PACK X5 5464

## (undated) DEVICE — DRAPE SHEET REV FOLD 3/4 9349

## (undated) DEVICE — SOL WATER IRRIG 1000ML BOTTLE 2F7114

## (undated) RX ORDER — FENTANYL CITRATE 0.05 MG/ML
INJECTION, SOLUTION INTRAMUSCULAR; INTRAVENOUS
Status: DISPENSED
Start: 2024-12-31

## (undated) RX ORDER — CEFAZOLIN SODIUM/WATER 2 G/20 ML
SYRINGE (ML) INTRAVENOUS
Status: DISPENSED
Start: 2024-12-31

## (undated) RX ORDER — ONDANSETRON 2 MG/ML
INJECTION INTRAMUSCULAR; INTRAVENOUS
Status: DISPENSED
Start: 2024-12-31

## (undated) RX ORDER — DEXAMETHASONE SODIUM PHOSPHATE 4 MG/ML
INJECTION, SOLUTION INTRA-ARTICULAR; INTRALESIONAL; INTRAMUSCULAR; INTRAVENOUS; SOFT TISSUE
Status: DISPENSED
Start: 2024-12-31

## (undated) RX ORDER — CLINDAMYCIN PHOSPHATE 900 MG/50ML
INJECTION, SOLUTION INTRAVENOUS
Status: DISPENSED
Start: 2024-12-31

## (undated) RX ORDER — PROPOFOL 10 MG/ML
INJECTION, EMULSION INTRAVENOUS
Status: DISPENSED
Start: 2024-12-31

## (undated) RX ORDER — BUPIVACAINE HYDROCHLORIDE AND EPINEPHRINE 5; 5 MG/ML; UG/ML
INJECTION, SOLUTION EPIDURAL; INTRACAUDAL; PERINEURAL
Status: DISPENSED
Start: 2024-12-31

## (undated) RX ORDER — HYDROMORPHONE HYDROCHLORIDE 1 MG/ML
INJECTION, SOLUTION INTRAMUSCULAR; INTRAVENOUS; SUBCUTANEOUS
Status: DISPENSED
Start: 2024-12-31

## (undated) RX ORDER — FENTANYL CITRATE 50 UG/ML
INJECTION, SOLUTION INTRAMUSCULAR; INTRAVENOUS
Status: DISPENSED
Start: 2024-12-31

## (undated) RX ORDER — EPHEDRINE SULFATE 50 MG/ML
INJECTION, SOLUTION INTRAMUSCULAR; INTRAVENOUS; SUBCUTANEOUS
Status: DISPENSED
Start: 2024-12-31

## (undated) RX ORDER — OXYCODONE HYDROCHLORIDE 5 MG/1
TABLET ORAL
Status: DISPENSED
Start: 2024-12-31